# Patient Record
Sex: FEMALE | Race: WHITE | NOT HISPANIC OR LATINO | Employment: OTHER | URBAN - NONMETROPOLITAN AREA
[De-identification: names, ages, dates, MRNs, and addresses within clinical notes are randomized per-mention and may not be internally consistent; named-entity substitution may affect disease eponyms.]

---

## 2017-01-17 ENCOUNTER — TRANSCRIBE ORDERS (OUTPATIENT)
Dept: ADMINISTRATIVE | Facility: HOSPITAL | Age: 52
End: 2017-01-17

## 2017-01-17 DIAGNOSIS — Z12.31 VISIT FOR SCREENING MAMMOGRAM: Primary | ICD-10-CM

## 2017-01-20 ENCOUNTER — HOSPITAL ENCOUNTER (OUTPATIENT)
Dept: MAMMOGRAPHY | Facility: HOSPITAL | Age: 52
End: 2017-01-20

## 2017-01-27 ENCOUNTER — HOSPITAL ENCOUNTER (OUTPATIENT)
Dept: MAMMOGRAPHY | Facility: HOSPITAL | Age: 52
End: 2017-01-27

## 2017-02-02 ENCOUNTER — HOSPITAL ENCOUNTER (OUTPATIENT)
Dept: MAMMOGRAPHY | Facility: HOSPITAL | Age: 52
Discharge: HOME | End: 2017-02-02
Admitting: NURSE PRACTITIONER

## 2017-02-02 DIAGNOSIS — Z12.31 VISIT FOR SCREENING MAMMOGRAM: ICD-10-CM

## 2017-02-02 PROCEDURE — 77067 SCR MAMMO BI INCL CAD: CPT | Performed by: RADIOLOGY

## 2017-02-02 PROCEDURE — 77063 BREAST TOMOSYNTHESIS BI: CPT

## 2017-02-02 PROCEDURE — G0202 SCR MAMMO BI INCL CAD: HCPCS

## 2017-02-02 PROCEDURE — 77063 BREAST TOMOSYNTHESIS BI: CPT | Performed by: RADIOLOGY

## 2017-10-06 ENCOUNTER — APPOINTMENT (OUTPATIENT)
Dept: GENERAL RADIOLOGY | Facility: HOSPITAL | Age: 52
End: 2017-10-06

## 2017-10-06 ENCOUNTER — HOSPITAL ENCOUNTER (EMERGENCY)
Facility: HOSPITAL | Age: 52
Discharge: HOME | End: 2017-10-06
Attending: EMERGENCY MEDICINE | Admitting: EMERGENCY MEDICINE

## 2017-10-06 VITALS
OXYGEN SATURATION: 96 % | TEMPERATURE: 98.2 F | RESPIRATION RATE: 20 BRPM | HEIGHT: 60 IN | HEART RATE: 88 BPM | SYSTOLIC BLOOD PRESSURE: 155 MMHG | WEIGHT: 170 LBS | BODY MASS INDEX: 33.38 KG/M2 | DIASTOLIC BLOOD PRESSURE: 87 MMHG

## 2017-10-06 DIAGNOSIS — N39.0 ACUTE UTI: ICD-10-CM

## 2017-10-06 DIAGNOSIS — J44.1 COPD WITH ACUTE EXACERBATION (HCC): Primary | ICD-10-CM

## 2017-10-06 LAB
A-A DO2: 56.2 MMHG (ref 0–300)
ALBUMIN SERPL-MCNC: 3.7 G/DL (ref 3.5–5)
ALBUMIN/GLOB SERPL: 1.1 G/DL (ref 1.5–2.5)
ALP SERPL-CCNC: 71 U/L (ref 35–104)
ALT SERPL W P-5'-P-CCNC: 42 U/L (ref 10–36)
ANION GAP SERPL CALCULATED.3IONS-SCNC: 3 MMOL/L (ref 3.6–11.2)
ANISOCYTOSIS BLD QL: NORMAL
APTT PPP: 28.1 SECONDS (ref 23.8–36.1)
ARTERIAL PATENCY WRIST A: POSITIVE
AST SERPL-CCNC: 32 U/L (ref 10–30)
ATMOSPHERIC PRESS: 729 MMHG
BACTERIA UR QL AUTO: ABNORMAL /HPF
BASE EXCESS BLDA CALC-SCNC: 8.5 MMOL/L
BASOPHILS # BLD AUTO: 0.01 10*3/MM3 (ref 0–0.3)
BASOPHILS NFR BLD AUTO: 0.2 % (ref 0–2)
BDY SITE: ABNORMAL
BILIRUB SERPL-MCNC: 0.7 MG/DL (ref 0.2–1.8)
BILIRUB UR QL STRIP: NEGATIVE
BNP SERPL-MCNC: 285 PG/ML (ref 0–100)
BODY TEMPERATURE: 98.6 C
BUN BLD-MCNC: 10 MG/DL (ref 7–21)
BUN/CREAT SERPL: 15.2 (ref 7–25)
CALCIUM SPEC-SCNC: 9.2 MG/DL (ref 7.7–10)
CHLORIDE SERPL-SCNC: 99 MMOL/L (ref 99–112)
CLARITY UR: ABNORMAL
CO2 SERPL-SCNC: 38 MMOL/L (ref 24.3–31.9)
COHGB MFR BLD: 11.7 % (ref 0–5)
COLOR UR: YELLOW
CREAT BLD-MCNC: 0.66 MG/DL (ref 0.43–1.29)
DEPRECATED RDW RBC AUTO: 60.4 FL (ref 37–54)
EOSINOPHIL # BLD AUTO: 0.04 10*3/MM3 (ref 0–0.7)
EOSINOPHIL NFR BLD AUTO: 0.6 % (ref 0–5)
ERYTHROCYTE [DISTWIDTH] IN BLOOD BY AUTOMATED COUNT: 15.5 % (ref 11.5–14.5)
GAS FLOW AIRWAY: 2 LPM
GFR SERPL CREATININE-BSD FRML MDRD: 95 ML/MIN/1.73
GLOBULIN UR ELPH-MCNC: 3.3 GM/DL
GLUCOSE BLD-MCNC: 110 MG/DL (ref 70–110)
GLUCOSE UR STRIP-MCNC: NEGATIVE MG/DL
HCO3 BLDA-SCNC: 38.4 MMOL/L (ref 22–26)
HCT VFR BLD AUTO: 61.4 % (ref 37–47)
HCT VFR BLD CALC: 59 % (ref 37–47)
HGB BLD-MCNC: 19.2 G/DL (ref 12–16)
HGB BLDA-MCNC: >20 G/DL (ref 12–16)
HGB UR QL STRIP.AUTO: ABNORMAL
HOLD SPECIMEN: NORMAL
HOLD SPECIMEN: NORMAL
HOROWITZ INDEX BLD+IHG-RTO: 28 %
HYALINE CASTS UR QL AUTO: ABNORMAL /LPF
HYPOCHROMIA BLD QL: NORMAL
IMM GRANULOCYTES # BLD: 0.02 10*3/MM3 (ref 0–0.03)
IMM GRANULOCYTES NFR BLD: 0.3 % (ref 0–0.5)
INR PPP: 0.95 (ref 0.9–1.1)
KETONES UR QL STRIP: NEGATIVE
LARGE PLATELETS: NORMAL
LEUKOCYTE ESTERASE UR QL STRIP.AUTO: ABNORMAL
LYMPHOCYTES # BLD AUTO: 1.29 10*3/MM3 (ref 1–3)
LYMPHOCYTES NFR BLD AUTO: 19.9 % (ref 21–51)
MACROCYTES BLD QL SMEAR: NORMAL
MCH RBC QN AUTO: 33.3 PG (ref 27–33)
MCHC RBC AUTO-ENTMCNC: 31.3 G/DL (ref 33–37)
MCV RBC AUTO: 106.4 FL (ref 80–94)
METHGB BLD QL: 0.2 % (ref 0–3)
MODALITY: ABNORMAL
MONOCYTES # BLD AUTO: 0.51 10*3/MM3 (ref 0.1–0.9)
MONOCYTES NFR BLD AUTO: 7.9 % (ref 0–10)
NEUTROPHILS # BLD AUTO: 4.61 10*3/MM3 (ref 1.4–6.5)
NEUTROPHILS NFR BLD AUTO: 71.1 % (ref 30–70)
NITRITE UR QL STRIP: NEGATIVE
OSMOLALITY SERPL CALC.SUM OF ELEC: 279.1 MOSM/KG (ref 273–305)
OXYHGB MFR BLDV: 77.1 % (ref 85–100)
PCO2 BLDA: 70.9 MM HG (ref 35–45)
PH BLDA: 7.35 PH UNITS (ref 7.35–7.45)
PH UR STRIP.AUTO: 8 [PH] (ref 5–8)
PLATELET # BLD AUTO: 109 10*3/MM3 (ref 130–400)
PMV BLD AUTO: 13.5 FL (ref 6–10)
PO2 BLDA: 51.1 MM HG (ref 80–100)
POTASSIUM BLD-SCNC: 4.5 MMOL/L (ref 3.5–5.3)
PROT SERPL-MCNC: 7 G/DL (ref 6–8)
PROT UR QL STRIP: ABNORMAL
PROTHROMBIN TIME: 12.7 SECONDS (ref 11–15.4)
RBC # BLD AUTO: 5.77 10*6/MM3 (ref 4.2–5.4)
RBC # UR: ABNORMAL /HPF
REF LAB TEST METHOD: ABNORMAL
SAO2 % BLDCOA: 87.5 % (ref 90–100)
SMALL PLATELETS BLD QL SMEAR: NORMAL
SODIUM BLD-SCNC: 140 MMOL/L (ref 135–153)
SP GR UR STRIP: 1.01 (ref 1–1.03)
SQUAMOUS #/AREA URNS HPF: ABNORMAL /HPF
STOMATOCYTES BLD QL SMEAR: NORMAL
TROPONIN I SERPL-MCNC: 0.04 NG/ML
UROBILINOGEN UR QL STRIP: ABNORMAL
WBC CLUMPS # UR AUTO: ABNORMAL /HPF
WBC NRBC COR # BLD: 6.48 10*3/MM3 (ref 4.5–12.5)
WBC UR QL AUTO: ABNORMAL /HPF
WHOLE BLOOD HOLD SPECIMEN: NORMAL
WHOLE BLOOD HOLD SPECIMEN: NORMAL

## 2017-10-06 PROCEDURE — 87077 CULTURE AEROBIC IDENTIFY: CPT | Performed by: EMERGENCY MEDICINE

## 2017-10-06 PROCEDURE — 71010 XR CHEST 1 VW: CPT | Performed by: RADIOLOGY

## 2017-10-06 PROCEDURE — 84484 ASSAY OF TROPONIN QUANT: CPT | Performed by: EMERGENCY MEDICINE

## 2017-10-06 PROCEDURE — 94640 AIRWAY INHALATION TREATMENT: CPT

## 2017-10-06 PROCEDURE — 85730 THROMBOPLASTIN TIME PARTIAL: CPT | Performed by: EMERGENCY MEDICINE

## 2017-10-06 PROCEDURE — 94799 UNLISTED PULMONARY SVC/PX: CPT

## 2017-10-06 PROCEDURE — 82805 BLOOD GASES W/O2 SATURATION: CPT | Performed by: EMERGENCY MEDICINE

## 2017-10-06 PROCEDURE — 81001 URINALYSIS AUTO W/SCOPE: CPT | Performed by: EMERGENCY MEDICINE

## 2017-10-06 PROCEDURE — 80053 COMPREHEN METABOLIC PANEL: CPT | Performed by: EMERGENCY MEDICINE

## 2017-10-06 PROCEDURE — 96374 THER/PROPH/DIAG INJ IV PUSH: CPT

## 2017-10-06 PROCEDURE — 82375 ASSAY CARBOXYHB QUANT: CPT | Performed by: EMERGENCY MEDICINE

## 2017-10-06 PROCEDURE — 87086 URINE CULTURE/COLONY COUNT: CPT | Performed by: EMERGENCY MEDICINE

## 2017-10-06 PROCEDURE — 93005 ELECTROCARDIOGRAM TRACING: CPT | Performed by: EMERGENCY MEDICINE

## 2017-10-06 PROCEDURE — 87186 SC STD MICRODIL/AGAR DIL: CPT | Performed by: EMERGENCY MEDICINE

## 2017-10-06 PROCEDURE — 25010000002 METHYLPREDNISOLONE PER 125 MG: Performed by: EMERGENCY MEDICINE

## 2017-10-06 PROCEDURE — 96375 TX/PRO/DX INJ NEW DRUG ADDON: CPT

## 2017-10-06 PROCEDURE — 83880 ASSAY OF NATRIURETIC PEPTIDE: CPT | Performed by: EMERGENCY MEDICINE

## 2017-10-06 PROCEDURE — 36600 WITHDRAWAL OF ARTERIAL BLOOD: CPT | Performed by: EMERGENCY MEDICINE

## 2017-10-06 PROCEDURE — 71010 HC CHEST PA OR AP: CPT

## 2017-10-06 PROCEDURE — 99283 EMERGENCY DEPT VISIT LOW MDM: CPT

## 2017-10-06 PROCEDURE — 85007 BL SMEAR W/DIFF WBC COUNT: CPT | Performed by: EMERGENCY MEDICINE

## 2017-10-06 PROCEDURE — 83050 HGB METHEMOGLOBIN QUAN: CPT | Performed by: EMERGENCY MEDICINE

## 2017-10-06 PROCEDURE — 85025 COMPLETE CBC W/AUTO DIFF WBC: CPT | Performed by: EMERGENCY MEDICINE

## 2017-10-06 PROCEDURE — 25010000002 DIPHENHYDRAMINE PER 50 MG: Performed by: EMERGENCY MEDICINE

## 2017-10-06 PROCEDURE — 25010000002 CEFTRIAXONE PER 250 MG: Performed by: EMERGENCY MEDICINE

## 2017-10-06 PROCEDURE — 85610 PROTHROMBIN TIME: CPT | Performed by: EMERGENCY MEDICINE

## 2017-10-06 RX ORDER — IPRATROPIUM BROMIDE AND ALBUTEROL SULFATE 2.5; .5 MG/3ML; MG/3ML
3 SOLUTION RESPIRATORY (INHALATION) ONCE
Status: DISCONTINUED | OUTPATIENT
Start: 2017-10-06 | End: 2017-10-06

## 2017-10-06 RX ORDER — SULFAMETHOXAZOLE AND TRIMETHOPRIM 800; 160 MG/1; MG/1
1 TABLET ORAL 2 TIMES DAILY
Qty: 14 TABLET | Refills: 0 | Status: SHIPPED | OUTPATIENT
Start: 2017-10-06 | End: 2017-10-13

## 2017-10-06 RX ORDER — METHYLPREDNISOLONE SODIUM SUCCINATE 125 MG/2ML
125 INJECTION, POWDER, LYOPHILIZED, FOR SOLUTION INTRAMUSCULAR; INTRAVENOUS ONCE
Status: COMPLETED | OUTPATIENT
Start: 2017-10-06 | End: 2017-10-06

## 2017-10-06 RX ORDER — DIPHENHYDRAMINE HYDROCHLORIDE 50 MG/ML
INJECTION INTRAMUSCULAR; INTRAVENOUS
Status: DISCONTINUED
Start: 2017-10-06 | End: 2017-10-06 | Stop reason: HOSPADM

## 2017-10-06 RX ORDER — SODIUM CHLORIDE 0.9 % (FLUSH) 0.9 %
10 SYRINGE (ML) INJECTION AS NEEDED
Status: DISCONTINUED | OUTPATIENT
Start: 2017-10-06 | End: 2017-10-06 | Stop reason: HOSPADM

## 2017-10-06 RX ORDER — ALBUTEROL SULFATE 2.5 MG/3ML
2.5 SOLUTION RESPIRATORY (INHALATION) ONCE
Status: COMPLETED | OUTPATIENT
Start: 2017-10-06 | End: 2017-10-06

## 2017-10-06 RX ORDER — IPRATROPIUM BROMIDE AND ALBUTEROL SULFATE 2.5; .5 MG/3ML; MG/3ML
SOLUTION RESPIRATORY (INHALATION)
Status: COMPLETED
Start: 2017-10-06 | End: 2017-10-06

## 2017-10-06 RX ORDER — IPRATROPIUM BROMIDE AND ALBUTEROL SULFATE 2.5; .5 MG/3ML; MG/3ML
3 SOLUTION RESPIRATORY (INHALATION) ONCE
Status: COMPLETED | OUTPATIENT
Start: 2017-10-06 | End: 2017-10-06

## 2017-10-06 RX ORDER — DIPHENHYDRAMINE HYDROCHLORIDE 50 MG/ML
25 INJECTION INTRAMUSCULAR; INTRAVENOUS ONCE
Status: COMPLETED | OUTPATIENT
Start: 2017-10-06 | End: 2017-10-06

## 2017-10-06 RX ORDER — PREDNISONE 10 MG/1
10 TABLET ORAL DAILY
Qty: 7 TABLET | Refills: 0 | Status: ON HOLD | OUTPATIENT
Start: 2017-10-06 | End: 2018-04-02

## 2017-10-06 RX ADMIN — IPRATROPIUM BROMIDE AND ALBUTEROL SULFATE 3 ML: .5; 3 SOLUTION RESPIRATORY (INHALATION) at 12:42

## 2017-10-06 RX ADMIN — IPRATROPIUM BROMIDE AND ALBUTEROL SULFATE 3 ML: 2.5; .5 SOLUTION RESPIRATORY (INHALATION) at 12:42

## 2017-10-06 RX ADMIN — ALBUTEROL SULFATE 2.5 MG: 2.5 SOLUTION RESPIRATORY (INHALATION) at 14:16

## 2017-10-06 RX ADMIN — DIPHENHYDRAMINE HYDROCHLORIDE 25 MG: 50 INJECTION INTRAMUSCULAR; INTRAVENOUS at 14:50

## 2017-10-06 RX ADMIN — METHYLPREDNISOLONE SODIUM SUCCINATE 125 MG: 125 INJECTION, POWDER, FOR SOLUTION INTRAMUSCULAR; INTRAVENOUS at 14:32

## 2017-10-06 RX ADMIN — CEFTRIAXONE 1 G: 1 INJECTION, SOLUTION INTRAVENOUS at 14:37

## 2017-10-06 NOTE — ED PROVIDER NOTES
Subjective   Patient is a 51 y.o. female presenting with shortness of breath.   Shortness of Breath   Severity:  Moderate  Onset quality:  Gradual  Timing:  Constant  Progression:  Worsening  Chronicity:  Chronic  Context: activity    Relieved by:  Nothing  Worsened by:  Exertion and movement  Associated symptoms: cough and wheezing    Associated symptoms: no abdominal pain, no chest pain and no fever        Review of Systems   Constitutional: Negative.  Negative for fever.   HENT: Negative.    Respiratory: Positive for cough, shortness of breath and wheezing.    Cardiovascular: Negative.  Negative for chest pain.   Gastrointestinal: Negative.  Negative for abdominal pain.   Endocrine: Negative.    Genitourinary: Negative.  Negative for dysuria.   Skin: Negative.    Neurological: Negative.    Psychiatric/Behavioral: Negative.    All other systems reviewed and are negative.      No past medical history on file.    Allergies   Allergen Reactions   • Amoxicillin    • Codeine        Past Surgical History:   Procedure Laterality Date   • HYSTERECTOMY         Family History   Problem Relation Age of Onset   • Breast cancer Paternal Aunt        Social History     Social History   • Marital status:      Spouse name: N/A   • Number of children: N/A   • Years of education: N/A     Social History Main Topics   • Smoking status: Not on file   • Smokeless tobacco: Not on file   • Alcohol use No   • Drug use: Not on file   • Sexual activity: Not on file     Other Topics Concern   • Not on file     Social History Narrative   • No narrative on file           Objective   Physical Exam   Constitutional: She is oriented to person, place, and time. She appears well-developed and well-nourished. No distress.   HENT:   Head: Normocephalic and atraumatic.   Right Ear: External ear normal.   Left Ear: External ear normal.   Nose: Nose normal.   Eyes: Conjunctivae and EOM are normal. Pupils are equal, round, and reactive to light.    Neck: Normal range of motion. Neck supple. No JVD present. No tracheal deviation present.   Cardiovascular: Normal rate, regular rhythm and normal heart sounds.    No murmur heard.  Pulmonary/Chest: She is in respiratory distress. She has wheezes.   Abdominal: Soft. Bowel sounds are normal. There is no tenderness.   Musculoskeletal: Normal range of motion. She exhibits no edema or deformity.   Neurological: She is alert and oriented to person, place, and time. No cranial nerve deficit.   Skin: Skin is warm and dry. No rash noted. She is not diaphoretic. No erythema. No pallor.   Psychiatric: She has a normal mood and affect. Her behavior is normal. Thought content normal.   Nursing note and vitals reviewed.      Procedures         ED Course  ED Course                  MDM    Final diagnoses:   COPD with acute exacerbation   Acute UTI            Manfred Wilson MD  10/06/17 5438

## 2017-10-09 LAB
BACTERIA SPEC AEROBE CULT: ABNORMAL
BACTERIA SPEC AEROBE CULT: ABNORMAL

## 2017-10-23 ENCOUNTER — APPOINTMENT (OUTPATIENT)
Dept: GENERAL RADIOLOGY | Facility: HOSPITAL | Age: 52
End: 2017-10-23

## 2017-10-23 ENCOUNTER — HOSPITAL ENCOUNTER (EMERGENCY)
Facility: HOSPITAL | Age: 52
Discharge: LEFT BEFORE DISCHARGE | End: 2017-10-23
Attending: FAMILY MEDICINE | Admitting: FAMILY MEDICINE

## 2017-10-23 VITALS
TEMPERATURE: 98.3 F | HEART RATE: 88 BPM | HEIGHT: 60 IN | BODY MASS INDEX: 34.16 KG/M2 | OXYGEN SATURATION: 88 % | WEIGHT: 174 LBS | DIASTOLIC BLOOD PRESSURE: 78 MMHG | RESPIRATION RATE: 20 BRPM | SYSTOLIC BLOOD PRESSURE: 133 MMHG

## 2017-10-23 DIAGNOSIS — J96.02 ACUTE RESPIRATORY FAILURE WITH HYPOXIA AND HYPERCAPNIA (HCC): Primary | ICD-10-CM

## 2017-10-23 DIAGNOSIS — J96.01 ACUTE RESPIRATORY FAILURE WITH HYPOXIA AND HYPERCAPNIA (HCC): Primary | ICD-10-CM

## 2017-10-23 LAB
A-A DO2: 43.7 MMHG (ref 0–300)
A-A DO2: 78.9 MMHG (ref 0–300)
ALBUMIN SERPL-MCNC: 3.8 G/DL (ref 3.5–5)
ALBUMIN/GLOB SERPL: 1.1 G/DL (ref 1.5–2.5)
ALP SERPL-CCNC: 74 U/L (ref 35–104)
ALT SERPL W P-5'-P-CCNC: 28 U/L (ref 10–36)
ANION GAP SERPL CALCULATED.3IONS-SCNC: 3.5 MMOL/L (ref 3.6–11.2)
APTT PPP: 31.6 SECONDS (ref 23.8–36.1)
ARTERIAL PATENCY WRIST A: POSITIVE
ARTERIAL PATENCY WRIST A: POSITIVE
AST SERPL-CCNC: 26 U/L (ref 10–30)
ATMOSPHERIC PRESS: 723 MMHG
ATMOSPHERIC PRESS: 723 MMHG
BASE EXCESS BLDA CALC-SCNC: 8.1 MMOL/L
BASE EXCESS BLDA CALC-SCNC: 9.4 MMOL/L
BASOPHILS # BLD AUTO: 0.02 10*3/MM3 (ref 0–0.3)
BASOPHILS NFR BLD AUTO: 0.3 % (ref 0–2)
BDY SITE: ABNORMAL
BDY SITE: ABNORMAL
BILIRUB SERPL-MCNC: 0.3 MG/DL (ref 0.2–1.8)
BODY TEMPERATURE: 98.6 C
BODY TEMPERATURE: 98.6 C
BUN BLD-MCNC: 8 MG/DL (ref 7–21)
BUN/CREAT SERPL: 12.1 (ref 7–25)
CALCIUM SPEC-SCNC: 9.3 MG/DL (ref 7.7–10)
CHLORIDE SERPL-SCNC: 98 MMOL/L (ref 99–112)
CO2 SERPL-SCNC: 37.5 MMOL/L (ref 24.3–31.9)
COHGB MFR BLD: 11.1 % (ref 0–5)
COHGB MFR BLD: 12.9 % (ref 0–5)
CREAT BLD-MCNC: 0.66 MG/DL (ref 0.43–1.29)
CRP SERPL-MCNC: <0.5 MG/DL (ref 0–0.99)
D DIMER PPP FEU-MCNC: <0.27 MCGFEU/ML (ref 0–0.5)
D-LACTATE SERPL-SCNC: 1.1 MMOL/L (ref 0.5–2)
DEPRECATED RDW RBC AUTO: 58.7 FL (ref 37–54)
EOSINOPHIL # BLD AUTO: 0.06 10*3/MM3 (ref 0–0.7)
EOSINOPHIL NFR BLD AUTO: 0.8 % (ref 0–5)
EPAP: 6
ERYTHROCYTE [DISTWIDTH] IN BLOOD BY AUTOMATED COUNT: 15.5 % (ref 11.5–14.5)
GFR SERPL CREATININE-BSD FRML MDRD: 95 ML/MIN/1.73
GLOBULIN UR ELPH-MCNC: 3.6 GM/DL
GLUCOSE BLD-MCNC: 113 MG/DL (ref 70–110)
HCO3 BLDA-SCNC: 40 MMOL/L (ref 22–26)
HCO3 BLDA-SCNC: 40.7 MMOL/L (ref 22–26)
HCT VFR BLD AUTO: 64.9 % (ref 37–47)
HCT VFR BLD CALC: 62 % (ref 37–47)
HCT VFR BLD CALC: 63 % (ref 37–47)
HGB BLD-MCNC: 20.3 G/DL (ref 12–16)
HGB BLDA-MCNC: >20 G/DL (ref 12–16)
HGB BLDA-MCNC: >20 G/DL (ref 12–16)
HOROWITZ INDEX BLD+IHG-RTO: 28 %
HOROWITZ INDEX BLD+IHG-RTO: 35 %
IMM GRANULOCYTES # BLD: 0.01 10*3/MM3 (ref 0–0.03)
IMM GRANULOCYTES NFR BLD: 0.1 % (ref 0–0.5)
INR PPP: 0.96 (ref 0.9–1.1)
IPAP: 15
LYMPHOCYTES # BLD AUTO: 1.37 10*3/MM3 (ref 1–3)
LYMPHOCYTES NFR BLD AUTO: 18.2 % (ref 21–51)
MCH RBC QN AUTO: 32.1 PG (ref 27–33)
MCHC RBC AUTO-ENTMCNC: 31.3 G/DL (ref 33–37)
MCV RBC AUTO: 102.7 FL (ref 80–94)
METHGB BLD QL: 0.3 % (ref 0–3)
METHGB BLD QL: 0.4 % (ref 0–3)
MODALITY: ABNORMAL
MODALITY: ABNORMAL
MONOCYTES # BLD AUTO: 0.63 10*3/MM3 (ref 0.1–0.9)
MONOCYTES NFR BLD AUTO: 8.4 % (ref 0–10)
NEUTROPHILS # BLD AUTO: 5.42 10*3/MM3 (ref 1.4–6.5)
NEUTROPHILS NFR BLD AUTO: 72.2 % (ref 30–70)
OSMOLALITY SERPL CALC.SUM OF ELEC: 276.7 MOSM/KG (ref 273–305)
OXYHGB MFR BLDV: 77.5 % (ref 85–100)
OXYHGB MFR BLDV: 80.7 % (ref 85–100)
PCO2 BLDA: 77.7 MM HG (ref 35–45)
PCO2 BLDA: 82.8 MM HG (ref 35–45)
PH BLDA: 7.3 PH UNITS (ref 7.35–7.45)
PH BLDA: 7.34 PH UNITS (ref 7.35–7.45)
PLATELET # BLD AUTO: 97 10*3/MM3 (ref 130–400)
PMV BLD AUTO: 12.4 FL (ref 6–10)
PO2 BLDA: 53.9 MM HG (ref 80–100)
PO2 BLDA: 61.4 MM HG (ref 80–100)
POTASSIUM BLD-SCNC: 4.4 MMOL/L (ref 3.5–5.3)
PROT SERPL-MCNC: 7.4 G/DL (ref 6–8)
PROTHROMBIN TIME: 12.9 SECONDS (ref 11–15.4)
RBC # BLD AUTO: 6.32 10*6/MM3 (ref 4.2–5.4)
SAO2 % BLDCOA: 89.3 % (ref 90–100)
SAO2 % BLDCOA: 91.2 % (ref 90–100)
SET MECH RESP RATE: 18
SODIUM BLD-SCNC: 139 MMOL/L (ref 135–153)
TROPONIN I SERPL-MCNC: 0.02 NG/ML
WBC NRBC COR # BLD: 7.51 10*3/MM3 (ref 4.5–12.5)

## 2017-10-23 PROCEDURE — 83050 HGB METHEMOGLOBIN QUAN: CPT | Performed by: FAMILY MEDICINE

## 2017-10-23 PROCEDURE — 87040 BLOOD CULTURE FOR BACTERIA: CPT | Performed by: FAMILY MEDICINE

## 2017-10-23 PROCEDURE — 80053 COMPREHEN METABOLIC PANEL: CPT | Performed by: FAMILY MEDICINE

## 2017-10-23 PROCEDURE — 83605 ASSAY OF LACTIC ACID: CPT | Performed by: FAMILY MEDICINE

## 2017-10-23 PROCEDURE — 85730 THROMBOPLASTIN TIME PARTIAL: CPT | Performed by: FAMILY MEDICINE

## 2017-10-23 PROCEDURE — 85379 FIBRIN DEGRADATION QUANT: CPT | Performed by: FAMILY MEDICINE

## 2017-10-23 PROCEDURE — 93005 ELECTROCARDIOGRAM TRACING: CPT | Performed by: FAMILY MEDICINE

## 2017-10-23 PROCEDURE — 82805 BLOOD GASES W/O2 SATURATION: CPT | Performed by: FAMILY MEDICINE

## 2017-10-23 PROCEDURE — 85025 COMPLETE CBC W/AUTO DIFF WBC: CPT | Performed by: FAMILY MEDICINE

## 2017-10-23 PROCEDURE — 94799 UNLISTED PULMONARY SVC/PX: CPT

## 2017-10-23 PROCEDURE — 36415 COLL VENOUS BLD VENIPUNCTURE: CPT

## 2017-10-23 PROCEDURE — 99284 EMERGENCY DEPT VISIT MOD MDM: CPT

## 2017-10-23 PROCEDURE — 86140 C-REACTIVE PROTEIN: CPT | Performed by: FAMILY MEDICINE

## 2017-10-23 PROCEDURE — 71010 HC CHEST PA OR AP: CPT

## 2017-10-23 PROCEDURE — 36600 WITHDRAWAL OF ARTERIAL BLOOD: CPT | Performed by: FAMILY MEDICINE

## 2017-10-23 PROCEDURE — 71010 XR CHEST 1 VW: CPT | Performed by: RADIOLOGY

## 2017-10-23 PROCEDURE — 94640 AIRWAY INHALATION TREATMENT: CPT

## 2017-10-23 PROCEDURE — 94660 CPAP INITIATION&MGMT: CPT

## 2017-10-23 PROCEDURE — 82375 ASSAY CARBOXYHB QUANT: CPT | Performed by: FAMILY MEDICINE

## 2017-10-23 PROCEDURE — 84484 ASSAY OF TROPONIN QUANT: CPT | Performed by: FAMILY MEDICINE

## 2017-10-23 PROCEDURE — 93010 ELECTROCARDIOGRAM REPORT: CPT | Performed by: INTERNAL MEDICINE

## 2017-10-23 PROCEDURE — 85610 PROTHROMBIN TIME: CPT | Performed by: FAMILY MEDICINE

## 2017-10-23 RX ORDER — SODIUM CHLORIDE 0.9 % (FLUSH) 0.9 %
10 SYRINGE (ML) INJECTION AS NEEDED
Status: DISCONTINUED | OUTPATIENT
Start: 2017-10-23 | End: 2017-10-23 | Stop reason: HOSPADM

## 2017-10-23 RX ORDER — ALBUTEROL SULFATE 90 UG/1
2 AEROSOL, METERED RESPIRATORY (INHALATION) EVERY 4 HOURS PRN
COMMUNITY

## 2017-10-23 RX ORDER — IBUPROFEN 800 MG/1
800 TABLET ORAL EVERY 6 HOURS PRN
Status: ON HOLD | COMMUNITY
End: 2018-04-02

## 2017-10-23 RX ORDER — BUDESONIDE AND FORMOTEROL FUMARATE DIHYDRATE 160; 4.5 UG/1; UG/1
2 AEROSOL RESPIRATORY (INHALATION)
Status: ON HOLD | COMMUNITY
End: 2018-04-02

## 2017-10-23 RX ORDER — DOXYCYCLINE HYCLATE 100 MG/1
100 TABLET, DELAYED RELEASE ORAL 2 TIMES DAILY
Qty: 20 TABLET | Refills: 0 | Status: SHIPPED | OUTPATIENT
Start: 2017-10-23 | End: 2017-11-02

## 2017-10-23 RX ORDER — DOXYCYCLINE 100 MG/1
CAPSULE ORAL
Status: COMPLETED
Start: 2017-10-23 | End: 2017-10-23

## 2017-10-23 RX ORDER — IPRATROPIUM BROMIDE AND ALBUTEROL SULFATE 2.5; .5 MG/3ML; MG/3ML
3 SOLUTION RESPIRATORY (INHALATION) ONCE
Status: COMPLETED | OUTPATIENT
Start: 2017-10-23 | End: 2017-10-23

## 2017-10-23 RX ORDER — DOXYCYCLINE 100 MG/1
100 CAPSULE ORAL ONCE
Status: COMPLETED | OUTPATIENT
Start: 2017-10-23 | End: 2017-10-23

## 2017-10-23 RX ORDER — PREDNISONE 20 MG/1
20 TABLET ORAL 3 TIMES DAILY
Qty: 15 TABLET | Refills: 0 | Status: SHIPPED | OUTPATIENT
Start: 2017-10-23 | End: 2017-10-28

## 2017-10-23 RX ORDER — SULFAMETHOXAZOLE AND TRIMETHOPRIM 800; 160 MG/1; MG/1
1 TABLET ORAL 2 TIMES DAILY
Status: ON HOLD | COMMUNITY
End: 2018-04-02

## 2017-10-23 RX ADMIN — DOXYCYCLINE 100 MG: 100 CAPSULE ORAL at 12:53

## 2017-10-23 RX ADMIN — IPRATROPIUM BROMIDE AND ALBUTEROL SULFATE 3 ML: .5; 3 SOLUTION RESPIRATORY (INHALATION) at 10:40

## 2017-10-23 NOTE — ED NOTES
Pt resting quietly on stretcher with no complaints.  Pt AAOx4 with no resp distress noted, respirations even and unlabored.  Pt denies any needs at this time.  Skin PWD.  Pt family at bedside. Will continue to monitor and follow plan of care.  Bed rails up x2, bed in lowest position, call light in reach.       Rosalina Siegel RN  10/23/17 5492

## 2017-10-23 NOTE — ED NOTES
Pt resting quietly on stretcher with no complaints.  Pt AAOx4 with no resp distress noted, respirations even and unlabored.  Pt denies any needs at this time.  Skin PWD.  Pt family at bedside. Will continue to monitor and follow plan of care.  Bed rails up x2, bed in lowest position, call light in reach.       Rosalina Siegel RN  10/23/17 5072

## 2017-10-23 NOTE — ED PROVIDER NOTES
Subjective   HPI Comments: PT reports that she is not sure why she is here because she did not want to be here to begin with- she never went to her pcp for her breathing she went for her abdominal pain- so why she is here this is her baseline of breathing    Patient is a 51 y.o. female presenting with shortness of breath.   History provided by:  Patient, caregiver and relative  Shortness of Breath   Severity:  Severe  Onset quality:  Unable to specify  Timing:  Unable to specify  Progression:  Unable to specify  Chronicity:  Recurrent  Context: activity and weather changes    Relieved by:  Nothing  Worsened by:  Coughing  Ineffective treatments:  None tried  Associated symptoms: cough, sputum production and wheezing    Associated symptoms: no abdominal pain, no chest pain, no claudication, no fever, no headaches, no hemoptysis, no neck pain, no PND, no rash and no vomiting    Risk factors: tobacco use    Risk factors: no recent alcohol use, no family hx of DVT, no hx of cancer, no hx of PE/DVT, no obesity, no oral contraceptive use, no prolonged immobilization and no recent surgery        Review of Systems   Constitutional: Negative for activity change, appetite change, chills, fatigue and fever.   HENT: Negative for congestion.    Eyes: Negative for pain.   Respiratory: Positive for cough, sputum production, shortness of breath and wheezing. Negative for hemoptysis and stridor.    Cardiovascular: Negative for chest pain, claudication and PND.   Gastrointestinal: Negative for abdominal pain, diarrhea, nausea and vomiting.   Genitourinary: Negative for dysuria.   Musculoskeletal: Negative for arthralgias, myalgias, neck pain and neck stiffness.   Skin: Negative for rash.   Neurological: Negative for dizziness, syncope, speech difficulty, weakness and headaches.   Psychiatric/Behavioral: Negative for agitation.       Past Medical History:   Diagnosis Date   • COPD (chronic obstructive pulmonary disease)         Allergies   Allergen Reactions   • Amoxicillin    • Codeine    • Rocephin [Ceftriaxone]        Past Surgical History:   Procedure Laterality Date   • HYSTERECTOMY         Family History   Problem Relation Age of Onset   • Breast cancer Paternal Aunt        Social History     Social History   • Marital status:      Spouse name: N/A   • Number of children: N/A   • Years of education: N/A     Social History Main Topics   • Smoking status: Current Every Day Smoker   • Smokeless tobacco: None   • Alcohol use No   • Drug use: None   • Sexual activity: Not Asked     Other Topics Concern   • None     Social History Narrative   • None           Objective   Physical Exam   Constitutional: She is oriented to person, place, and time. She appears well-developed and well-nourished. She appears distressed.   HENT:   Head: Normocephalic and atraumatic.   Right Ear: External ear normal.   Left Ear: External ear normal.   Eyes: EOM are normal.   Neck: Neck supple.   Cardiovascular: Normal rate and regular rhythm.  Exam reveals no friction rub.    No murmur heard.  Pulmonary/Chest: She is in respiratory distress. She has wheezes.   Abdominal: Soft. Bowel sounds are normal.   Musculoskeletal: Normal range of motion.   Neurological: She is alert and oriented to person, place, and time.   Skin: Skin is warm.   Psychiatric: She has a normal mood and affect. Her behavior is normal. Thought content normal.   Nursing note and vitals reviewed.      Procedures         ED Course  ED Course   Value Comment By Time    Pt is alert and oriented - says that she us aware that she may die because her repeat ABG on Bipap  shows worsening pCO2- discussed possible of intubation - pt became very upset and says she will not be put on life support; she is alert and oriented to person place and time and says if she gets worse she will return; get this iv out of her she is going home Toña Oquendo, DO 10/23 4204   ECG 12 Lead EKG  interpretation ×1041 normal sinus rhythm 100 bpm right axis deviation noted QT is 354 QTc is 456 no evidence of acute ischemic change at this time Toña Oquendo DO 10/23 1312                  MDM  Number of Diagnoses or Management Options  Acute respiratory failure with hypoxia and hypercapnia: established and worsening     Amount and/or Complexity of Data Reviewed  Clinical lab tests: ordered and reviewed  Tests in the radiology section of CPT®: reviewed and ordered  Tests in the medicine section of CPT®: reviewed and ordered  Decide to obtain previous medical records or to obtain history from someone other than the patient: yes    Risk of Complications, Morbidity, and/or Mortality  Presenting problems: high  Diagnostic procedures: high  Management options: high    Patient Progress  Patient progress: (Pt left ama)      Final diagnoses:   None            Toña Oquendo DO  10/24/17 0803

## 2017-10-23 NOTE — ED NOTES
"Pt reports that she went to her PCP this am for recent abd swelling that has now subsided so she made an appointment to have that checked.  Pt reports that they sent her here due to having low oxygen sat's.  Pt reports that she has been having increased shortness of breath when she goes to \"quickly walk\" but other than that denies any shortness of breath.  Pt family at bedside.  Pt on home oxygen of 2L NC upon arrival.     Rosalina Siegel RN  10/23/17 1031    "

## 2017-10-23 NOTE — ED NOTES
"Dr. Oquendo in room speaking with patient in regards to worsening blood gases and the progression of treatment options that need to take place to stabilize the patient.  Pt becomes very angry and forcefully removes the Bi-Pap mask and informs \"I want to go home right now.  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  You will not put me on life support, I will go home right now\".  Pt redirected and attempted to calm pt down at this time.  Pt continues to demand that she is not staying and that she is going home and promises that if she worsens she will return to the ER.  Patient friend at bedside and attempting to encourage to stay for further treatment.  Pt continues to refuse further treatment and signs AMA papers at this time.     Rosalina Siegel RN  10/23/17 3717    "

## 2017-10-28 LAB
BACTERIA SPEC AEROBE CULT: NORMAL
BACTERIA SPEC AEROBE CULT: NORMAL

## 2018-04-02 ENCOUNTER — OFFICE VISIT (OUTPATIENT)
Dept: CARDIOLOGY | Facility: CLINIC | Age: 53
End: 2018-04-02

## 2018-04-02 ENCOUNTER — HOSPITAL ENCOUNTER (INPATIENT)
Facility: HOSPITAL | Age: 53
LOS: 3 days | Discharge: LEFT BEFORE DISCHARGE | End: 2018-04-05
Attending: INTERNAL MEDICINE | Admitting: INTERNAL MEDICINE

## 2018-04-02 ENCOUNTER — PREP FOR SURGERY (OUTPATIENT)
Dept: OTHER | Facility: HOSPITAL | Age: 53
End: 2018-04-02

## 2018-04-02 ENCOUNTER — APPOINTMENT (OUTPATIENT)
Dept: GENERAL RADIOLOGY | Facility: HOSPITAL | Age: 53
End: 2018-04-02

## 2018-04-02 VITALS
SYSTOLIC BLOOD PRESSURE: 201 MMHG | WEIGHT: 202 LBS | OXYGEN SATURATION: 73 % | HEART RATE: 91 BPM | HEIGHT: 60 IN | BODY MASS INDEX: 39.66 KG/M2 | RESPIRATION RATE: 18 BRPM | DIASTOLIC BLOOD PRESSURE: 99 MMHG

## 2018-04-02 DIAGNOSIS — I50.9 ACUTE HEART FAILURE, UNSPECIFIED HEART FAILURE TYPE (HCC): Primary | ICD-10-CM

## 2018-04-02 DIAGNOSIS — J41.0 SIMPLE CHRONIC BRONCHITIS (HCC): ICD-10-CM

## 2018-04-02 DIAGNOSIS — I10 UNCONTROLLED HYPERTENSION: ICD-10-CM

## 2018-04-02 DIAGNOSIS — I50.9 ACUTE HEART FAILURE, UNSPECIFIED HEART FAILURE TYPE (HCC): ICD-10-CM

## 2018-04-02 DIAGNOSIS — I50.9 ACUTE DECOMPENSATED HEART FAILURE (HCC): Primary | ICD-10-CM

## 2018-04-02 PROBLEM — J44.9 COPD (CHRONIC OBSTRUCTIVE PULMONARY DISEASE): Status: ACTIVE | Noted: 2018-04-02

## 2018-04-02 LAB
ALBUMIN SERPL-MCNC: 3.4 G/DL (ref 3.5–5)
ALBUMIN/GLOB SERPL: 0.9 G/DL (ref 1.5–2.5)
ALP SERPL-CCNC: 91 U/L (ref 35–104)
ALT SERPL W P-5'-P-CCNC: 16 U/L (ref 10–36)
ANION GAP SERPL CALCULATED.3IONS-SCNC: 7.9 MMOL/L (ref 3.6–11.2)
AST SERPL-CCNC: 22 U/L (ref 10–30)
BASOPHILS # BLD AUTO: 0.01 10*3/MM3 (ref 0–0.3)
BASOPHILS NFR BLD AUTO: 0.2 % (ref 0–2)
BILIRUB SERPL-MCNC: 1 MG/DL (ref 0.2–1.8)
BNP SERPL-MCNC: 712 PG/ML (ref 0–100)
BUN BLD-MCNC: 8 MG/DL (ref 7–21)
BUN/CREAT SERPL: 12.5 (ref 7–25)
CALCIUM SPEC-SCNC: 9.2 MG/DL (ref 7.7–10)
CHLORIDE SERPL-SCNC: 102 MMOL/L (ref 99–112)
CO2 SERPL-SCNC: 37.1 MMOL/L (ref 24.3–31.9)
CREAT BLD-MCNC: 0.64 MG/DL (ref 0.43–1.29)
DEPRECATED RDW RBC AUTO: 65 FL (ref 37–54)
EOSINOPHIL # BLD AUTO: 0.17 10*3/MM3 (ref 0–0.7)
EOSINOPHIL NFR BLD AUTO: 3.2 % (ref 0–5)
ERYTHROCYTE [DISTWIDTH] IN BLOOD BY AUTOMATED COUNT: 18 % (ref 11.5–14.5)
GFR SERPL CREATININE-BSD FRML MDRD: 98 ML/MIN/1.73
GLOBULIN UR ELPH-MCNC: 3.7 GM/DL
GLUCOSE BLD-MCNC: 84 MG/DL (ref 70–110)
HCT VFR BLD AUTO: 57.2 % (ref 37–47)
HGB BLD-MCNC: 17.5 G/DL (ref 12–16)
IMM GRANULOCYTES # BLD: 0.01 10*3/MM3 (ref 0–0.03)
IMM GRANULOCYTES NFR BLD: 0.2 % (ref 0–0.5)
LYMPHOCYTES # BLD AUTO: 0.99 10*3/MM3 (ref 1–3)
LYMPHOCYTES NFR BLD AUTO: 18.6 % (ref 21–51)
MCH RBC QN AUTO: 30.2 PG (ref 27–33)
MCHC RBC AUTO-ENTMCNC: 30.6 G/DL (ref 33–37)
MCV RBC AUTO: 98.6 FL (ref 80–94)
MONOCYTES # BLD AUTO: 0.47 10*3/MM3 (ref 0.1–0.9)
MONOCYTES NFR BLD AUTO: 8.9 % (ref 0–10)
NEUTROPHILS # BLD AUTO: 3.66 10*3/MM3 (ref 1.4–6.5)
NEUTROPHILS NFR BLD AUTO: 68.9 % (ref 30–70)
OSMOLALITY SERPL CALC.SUM OF ELEC: 289.9 MOSM/KG (ref 273–305)
PLATELET # BLD AUTO: 92 10*3/MM3 (ref 130–400)
PMV BLD AUTO: ABNORMAL FL (ref 6–10)
POTASSIUM BLD-SCNC: 3.5 MMOL/L (ref 3.5–5.3)
PROT SERPL-MCNC: 7.1 G/DL (ref 6–8)
RBC # BLD AUTO: 5.8 10*6/MM3 (ref 4.2–5.4)
SODIUM BLD-SCNC: 147 MMOL/L (ref 135–153)
TROPONIN I SERPL-MCNC: 0.05 NG/ML
WBC NRBC COR # BLD: 5.31 10*3/MM3 (ref 4.5–12.5)

## 2018-04-02 PROCEDURE — 99214 OFFICE O/P EST MOD 30 MIN: CPT | Performed by: INTERNAL MEDICINE

## 2018-04-02 PROCEDURE — 83880 ASSAY OF NATRIURETIC PEPTIDE: CPT | Performed by: INTERNAL MEDICINE

## 2018-04-02 PROCEDURE — 71045 X-RAY EXAM CHEST 1 VIEW: CPT | Performed by: RADIOLOGY

## 2018-04-02 PROCEDURE — 94640 AIRWAY INHALATION TREATMENT: CPT

## 2018-04-02 PROCEDURE — 94799 UNLISTED PULMONARY SVC/PX: CPT

## 2018-04-02 PROCEDURE — 84484 ASSAY OF TROPONIN QUANT: CPT | Performed by: INTERNAL MEDICINE

## 2018-04-02 PROCEDURE — 85025 COMPLETE CBC W/AUTO DIFF WBC: CPT | Performed by: INTERNAL MEDICINE

## 2018-04-02 PROCEDURE — 93000 ELECTROCARDIOGRAM COMPLETE: CPT | Performed by: INTERNAL MEDICINE

## 2018-04-02 PROCEDURE — 25010000002 ENOXAPARIN PER 10 MG: Performed by: INTERNAL MEDICINE

## 2018-04-02 PROCEDURE — 71045 X-RAY EXAM CHEST 1 VIEW: CPT

## 2018-04-02 PROCEDURE — 80053 COMPREHEN METABOLIC PANEL: CPT | Performed by: INTERNAL MEDICINE

## 2018-04-02 RX ORDER — DILTIAZEM HYDROCHLORIDE 240 MG/1
240 CAPSULE, COATED, EXTENDED RELEASE ORAL 2 TIMES DAILY
COMMUNITY
End: 2018-04-02 | Stop reason: SDDI

## 2018-04-02 RX ORDER — FUROSEMIDE 20 MG/1
20 TABLET ORAL DAILY
COMMUNITY
End: 2018-04-02 | Stop reason: SDDI

## 2018-04-02 RX ORDER — ALBUTEROL SULFATE 2.5 MG/3ML
2.5 SOLUTION RESPIRATORY (INHALATION) EVERY 4 HOURS PRN
COMMUNITY
End: 2018-08-13 | Stop reason: ALTCHOICE

## 2018-04-02 RX ORDER — BUMETANIDE 0.25 MG/ML
2 INJECTION INTRAMUSCULAR; INTRAVENOUS EVERY 12 HOURS
Status: DISCONTINUED | OUTPATIENT
Start: 2018-04-02 | End: 2018-04-03

## 2018-04-02 RX ORDER — ALBUTEROL SULFATE 2.5 MG/3ML
2.5 SOLUTION RESPIRATORY (INHALATION) EVERY 4 HOURS PRN
Status: DISCONTINUED | OUTPATIENT
Start: 2018-04-02 | End: 2018-04-05 | Stop reason: HOSPADM

## 2018-04-02 RX ORDER — SODIUM CHLORIDE 0.9 % (FLUSH) 0.9 %
1-10 SYRINGE (ML) INJECTION AS NEEDED
Status: DISCONTINUED | OUTPATIENT
Start: 2018-04-02 | End: 2018-04-05 | Stop reason: HOSPADM

## 2018-04-02 RX ORDER — SODIUM CHLORIDE 0.9 % (FLUSH) 0.9 %
1-10 SYRINGE (ML) INJECTION AS NEEDED
Status: CANCELLED | OUTPATIENT
Start: 2018-04-02

## 2018-04-02 RX ORDER — LOSARTAN POTASSIUM 50 MG/1
50 TABLET ORAL DAILY
Status: ON HOLD | COMMUNITY
End: 2018-04-02

## 2018-04-02 RX ORDER — AMLODIPINE BESYLATE 10 MG/1
10 TABLET ORAL
Status: DISCONTINUED | OUTPATIENT
Start: 2018-04-02 | End: 2018-04-04

## 2018-04-02 RX ADMIN — AMLODIPINE BESYLATE 10 MG: 10 TABLET ORAL at 17:32

## 2018-04-02 RX ADMIN — ENOXAPARIN SODIUM 40 MG: 40 INJECTION SUBCUTANEOUS at 15:27

## 2018-04-02 RX ADMIN — BUMETANIDE 2 MG: 0.25 INJECTION INTRAMUSCULAR; INTRAVENOUS at 17:33

## 2018-04-02 RX ADMIN — ALBUTEROL SULFATE 2.5 MG: 2.5 SOLUTION RESPIRATORY (INHALATION) at 14:48

## 2018-04-02 NOTE — PLAN OF CARE
Problem: Patient Care Overview  Goal: Plan of Care Review  Outcome: Ongoing (interventions implemented as appropriate)      Problem: Fall Risk (Adult)  Goal: Identify Related Risk Factors and Signs and Symptoms  Outcome: Ongoing (interventions implemented as appropriate)    Goal: Absence of Fall  Outcome: Ongoing (interventions implemented as appropriate)      Problem: Chronic Obstructive Pulmonary Disease (Adult)  Goal: Signs and Symptoms of Listed Potential Problems Will be Absent, Minimized or Managed (Chronic Obstructive Pulmonary Disease)  Outcome: Ongoing (interventions implemented as appropriate)      Problem: Cardiac: Heart Failure (Adult)  Goal: Signs and Symptoms of Listed Potential Problems Will be Absent, Minimized or Managed (Cardiac: Heart Failure)  Outcome: Ongoing (interventions implemented as appropriate)      Problem: Hypertensive Disease/Crisis (Arterial) (Adult)  Goal: Signs and Symptoms of Listed Potential Problems Will be Absent, Minimized or Managed (Hypertensive Disease/Crisis)  Outcome: Ongoing (interventions implemented as appropriate)

## 2018-04-02 NOTE — PLAN OF CARE
Problem: Patient Care Overview  Goal: Discharge Needs Assessment  Outcome: Ongoing (interventions implemented as appropriate)  Discharge Planning Assessment   Steven     Patient Name: Cristine Berkowitz  MRN: [id number]  Today's Date: 4/2/2018    Admit Date: 4/2/2018          Discharge Needs Assessment     Row Name 04/02/18 1413       Living Environment    Lives With child(soraida), adult;child(soraida), dependent   Pt lives with 25 Y/O son and 10 Y/O son, Amadeo.     Able to Return to Prior Arrangements yes       Discharge Needs Assessment    Equipment Currently Used at Home bipap/cpap;oxygen;nebulizer   Pt has home oxygen @ 2 liters, nebulizer machine (has nebulizer medicine), and C-Pap machine from [employer].     Outpatient/Agency/Support Group Needs --   Pt does not utilize home health services.             Discharge Plan     Row Name 04/02/18 1415       Plan    Plan Pt lives at home with 25 Y/O and 10 Y/O son's and plans to return home at discharge. Pt does not utilize home health services. Pt has home oxygen, nebulizer machine, and C-Pap machine from [employer]. PCP is NP, Afia Lopez and pt also see's Dr. Garcia and Dr. Church. Pt does not have a POA or advance directive. SS to follow and assist as needed with discharge planning.     Patient/Family in Agreement with Plan yes          Demographic Summary     Row Name 04/02/18 1412       General Information    Referral Source nursing    Reason for Consult --   SS received consult d/c planning. SS spoke to pt and sister in law.      Yi Virk

## 2018-04-02 NOTE — SIGNIFICANT NOTE
Pt stated she only likes 1/2 tx. And will do rest at her will. I explain we need to do the whole tx. We cant leave meds for her to do at will.

## 2018-04-02 NOTE — PROGRESS NOTES
Discharge Planning Assessment  [facility]     Patient Name: Cristine Berkowitz  MRN: [id number]  Today's Date: 4/2/2018    Admit Date: 4/2/2018          Discharge Needs Assessment     Row Name 04/02/18 1413       Living Environment    Lives With child(soraida), adult;child(soraida), dependent   Pt lives with 23 Y/O son and 10 Y/O son, Amadeo.     Able to Return to Prior Arrangements yes       Discharge Needs Assessment    Equipment Currently Used at Home bipap/cpap;oxygen;nebulizer   Pt has home oxygen @ 2 liters, nebulizer machine (has nebulizer medicine), and C-Pap machine from [facility].     Outpatient/Agency/Support Group Needs --   Pt does not utilize home health services.             Discharge Plan     Row Name 04/02/18 1415       Plan    Plan Pt lives at home with 23 Y/O and 10 Y/O son's and plans to return home at discharge. Pt does not utilize home health services. Pt has home oxygen, nebulizer machine, and C-Pap machine from [facility]. PCP is NP, Afia Lopez and pt also see's Dr. Garcia and Dr. Church. Pt does not have a POA or advance directive. SS to follow and assist as needed with discharge planning.     Patient/Family in Agreement with Plan yes                  Demographic Summary     Row Name 04/02/18 1412       General Information    Referral Source nursing    Reason for Consult --   SS received consult d/c planning. SS spoke to pt and sister in law.      Yi Virk

## 2018-04-02 NOTE — PROGRESS NOTES
ALONDRA Lott  Cristine Berkowitz  1965 04/02/2018    Patient Active Problem List   Diagnosis   • Acute heart failure   • Acute decompensated heart failure   • COPD (chronic obstructive pulmonary disease)   • Uncontrolled hypertension       Dear ALONDRA Lott:    Subjective     Cristine Berkowitz is a 52 y.o. female with the problems as listed above, presents    History of Present Illness: This Sho is a pleasant 52-year-old  female presenting with complaints of having progressive worsening dyspnea for the last 6 months and progressive weight gain and leg swelling.  On recent chest x-ray performed at her PCPs office she was apparently noted to have cardiomegaly and possible congestive heart failure.  She is here for further cardiac evaluation and management.  She also has underlying significant COPD and has been on home oxygen.  She she has some intermittent chest pressure when she gets short of breath.  She has a long history of smoking up to 3 packs a day but currently has cut down to 8 cigarettes a day.  She is nondiabetic.  She has not had any previous history of known coronary artery disease.  No previous history of congestive heart failure.  She has gained about 28 pounds since October 2017.      Allergies   Allergen Reactions   • Amoxicillin Anaphylaxis   • Codeine Hives   • Latex Itching   • Rocephin [Ceftriaxone]    :      Current Outpatient Prescriptions:   •  albuterol (PROVENTIL HFA;VENTOLIN HFA) 108 (90 Base) MCG/ACT inhaler, Inhale 2 puffs Every 4 (Four) Hours As Needed for Wheezing., Disp: , Rfl:   •  budesonide-formoterol (SYMBICORT) 160-4.5 MCG/ACT inhaler, Inhale 2 puffs 2 (Two) Times a Day., Disp: , Rfl:   •  losartan (COZAAR) 50 MG tablet, Take 50 mg by mouth Daily., Disp: , Rfl:   •  ibuprofen (ADVIL,MOTRIN) 800 MG tablet, Take 800 mg by mouth Every 6 (Six) Hours As Needed for Mild Pain ., Disp: , Rfl:   •  predniSONE (DELTASONE) 10 MG tablet, Take 1 tablet  "by mouth Daily., Disp: 7 tablet, Rfl: 0  •  sulfamethoxazole-trimethoprim (BACTRIM DS,SEPTRA DS) 800-160 MG per tablet, Take 1 tablet by mouth 2 (Two) Times a Day., Disp: , Rfl:     Past Medical History:   Diagnosis Date   • COPD (chronic obstructive pulmonary disease)      Past Surgical History:   Procedure Laterality Date   • HYSTERECTOMY       Family History   Problem Relation Age of Onset   • Breast cancer Paternal Aunt      Social History   Substance Use Topics   • Smoking status: Current Every Day Smoker   • Smokeless tobacco: Not on file   • Alcohol use No       Review of Systems   Constitution: Positive for malaise/fatigue.   Cardiovascular: Positive for dyspnea on exertion, leg swelling and palpitations.   Respiratory: Positive for shortness of breath (on O2, uses nebulizer and inhalers).    Hematologic/Lymphatic: Bruises/bleeds easily.   Skin: Positive for itching and rash.   Musculoskeletal: Positive for back pain, joint pain, muscle cramps, muscle weakness and myalgias.   Gastrointestinal: Positive for bloating, constipation and diarrhea.   Genitourinary: Positive for bladder incontinence and frequency.   Neurological: Positive for focal weakness, loss of balance and numbness.   Psychiatric/Behavioral: The patient has insomnia and is nervous/anxious.        Objective   Blood pressure (!) 201/99, pulse 91, resp. rate 18, height 152.4 cm (60\"), weight 91.6 kg (202 lb), SpO2 (!) 73 %.  Body mass index is 39.45 kg/m².        Physical Exam   Constitutional: She is oriented to person, place, and time. She appears well-developed and well-nourished.   HENT:   Head: Normocephalic.   Eyes: Conjunctivae and EOM are normal.   Neck: Normal range of motion. Neck supple. JVD present. No tracheal deviation present. No thyromegaly present.   Cardiovascular: Normal rate, regular rhythm, S1 normal and S2 normal.  Exam reveals no gallop, no S3, no S4 and no friction rub.    No murmur heard.  Pulmonary/Chest: No respiratory " distress. She has no wheezes. She has rales (Bilateral scattered rales).   Abdominal: Soft. Bowel sounds are normal. She exhibits no mass. There is no tenderness.   Musculoskeletal: She exhibits edema.   Neurological: She is alert and oriented to person, place, and time. No cranial nerve deficit.   Skin: Skin is warm and dry.   Psychiatric: She has a normal mood and affect.          Lab Results   Component Value Date    .0 (H) 10/06/2017         ECG 12 Lead  Date/Time: 4/2/2018 11:23 AM  Performed by: MIKE BARRETT  Authorized by: MIKE BARRETT   Rhythm: sinus rhythm  Comments: Nonspecific ST-T wave changes noted in the precordial leads.            Assessment/Plan   Diagnoses and all orders for this visit:    Acute decompensated heart failure  Simple chronic bronchitis  Uncontrolled hypertension.    Recommendations:     We will admit her to the hospital for further evaluation and management of her possible acute decompensated heart failure.    No Follow-up on file.    As always, I appreciate very much the opportunity to participate in the cardiovascular care of your patients.      With Best Regards,    Mike Barrett MD, FACC

## 2018-04-03 ENCOUNTER — APPOINTMENT (OUTPATIENT)
Dept: CARDIOLOGY | Facility: HOSPITAL | Age: 53
End: 2018-04-03
Attending: INTERNAL MEDICINE

## 2018-04-03 LAB
ALBUMIN SERPL-MCNC: 3.4 G/DL (ref 3.5–5)
ALBUMIN/GLOB SERPL: 0.9 G/DL (ref 1.5–2.5)
ALP SERPL-CCNC: 89 U/L (ref 35–104)
ALT SERPL W P-5'-P-CCNC: 19 U/L (ref 10–36)
ANION GAP SERPL CALCULATED.3IONS-SCNC: 2.5 MMOL/L (ref 3.6–11.2)
AST SERPL-CCNC: 27 U/L (ref 10–30)
BH CV ECHO MEAS - % IVS THICK: 59.5 %
BH CV ECHO MEAS - % LVPW THICK: 31.3 %
BH CV ECHO MEAS - ACS: 2.2 CM
BH CV ECHO MEAS - AO MAX PG: 9.8 MMHG
BH CV ECHO MEAS - AO MEAN PG: 4.6 MMHG
BH CV ECHO MEAS - AO ROOT AREA (BSA CORRECTED): 1.6
BH CV ECHO MEAS - AO ROOT AREA: 7.1 CM^2
BH CV ECHO MEAS - AO ROOT DIAM: 3 CM
BH CV ECHO MEAS - AO V2 MAX: 156.9 CM/SEC
BH CV ECHO MEAS - AO V2 MEAN: 94.4 CM/SEC
BH CV ECHO MEAS - AO V2 VTI: 28.3 CM
BH CV ECHO MEAS - BSA(HAYCOCK): 1.9 M^2
BH CV ECHO MEAS - BSA: 1.9 M^2
BH CV ECHO MEAS - BZI_BMI: 32.2 KILOGRAMS/M^2
BH CV ECHO MEAS - BZI_METRIC_HEIGHT: 160 CM
BH CV ECHO MEAS - BZI_METRIC_WEIGHT: 82.6 KG
BH CV ECHO MEAS - CONTRAST EF 4CH: 60.5 ML/M^2
BH CV ECHO MEAS - EDV(CUBED): 86.4 ML
BH CV ECHO MEAS - EDV(MOD-SP4): 43 ML
BH CV ECHO MEAS - EDV(TEICH): 88.7 ML
BH CV ECHO MEAS - EF(CUBED): 71.7 %
BH CV ECHO MEAS - EF(TEICH): 63.6 %
BH CV ECHO MEAS - ESV(CUBED): 24.4 ML
BH CV ECHO MEAS - ESV(MOD-SP4): 17 ML
BH CV ECHO MEAS - ESV(TEICH): 32.3 ML
BH CV ECHO MEAS - FS: 34.4 %
BH CV ECHO MEAS - IVS/LVPW: 0.85
BH CV ECHO MEAS - IVSD: 1.2 CM
BH CV ECHO MEAS - IVSS: 1.8 CM
BH CV ECHO MEAS - LA DIMENSION: 3.7 CM
BH CV ECHO MEAS - LA/AO: 1.2
BH CV ECHO MEAS - LV DIASTOLIC VOL/BSA (35-75): 23.1 ML/M^2
BH CV ECHO MEAS - LV MASS(C)D: 206.8 GRAMS
BH CV ECHO MEAS - LV MASS(C)DI: 111.3 GRAMS/M^2
BH CV ECHO MEAS - LV MASS(C)S: 212.5 GRAMS
BH CV ECHO MEAS - LV MASS(C)SI: 114.4 GRAMS/M^2
BH CV ECHO MEAS - LV SYSTOLIC VOL/BSA (12-30): 9.2 ML/M^2
BH CV ECHO MEAS - LVIDD: 4.4 CM
BH CV ECHO MEAS - LVIDS: 2.9 CM
BH CV ECHO MEAS - LVLD AP4: 7.4 CM
BH CV ECHO MEAS - LVLS AP4: 6.3 CM
BH CV ECHO MEAS - LVOT AREA (M): 2.3 CM^2
BH CV ECHO MEAS - LVOT AREA: 2.2 CM^2
BH CV ECHO MEAS - LVOT DIAM: 1.7 CM
BH CV ECHO MEAS - LVPWD: 1.4 CM
BH CV ECHO MEAS - LVPWS: 1.8 CM
BH CV ECHO MEAS - MV A MAX VEL: 104.2 CM/SEC
BH CV ECHO MEAS - MV E MAX VEL: 109.7 CM/SEC
BH CV ECHO MEAS - MV E/A: 1.1
BH CV ECHO MEAS - PA ACC SLOPE: 1432 CM/SEC^2
BH CV ECHO MEAS - PA ACC TIME: 0.1 SEC
BH CV ECHO MEAS - PA PR(ACCEL): 34.6 MMHG
BH CV ECHO MEAS - RAP SYSTOLE: 10 MMHG
BH CV ECHO MEAS - RVSP: 51.7 MMHG
BH CV ECHO MEAS - SI(AO): 107.7 ML/M^2
BH CV ECHO MEAS - SI(CUBED): 33.4 ML/M^2
BH CV ECHO MEAS - SI(MOD-SP4): 14 ML/M^2
BH CV ECHO MEAS - SI(TEICH): 30.4 ML/M^2
BH CV ECHO MEAS - SV(AO): 200.2 ML
BH CV ECHO MEAS - SV(CUBED): 62 ML
BH CV ECHO MEAS - SV(MOD-SP4): 26 ML
BH CV ECHO MEAS - SV(TEICH): 56.4 ML
BH CV ECHO MEAS - TR MAX VEL: 322.8 CM/SEC
BILIRUB SERPL-MCNC: 1.3 MG/DL (ref 0.2–1.8)
BUN BLD-MCNC: 7 MG/DL (ref 7–21)
BUN/CREAT SERPL: 10.8 (ref 7–25)
CALCIUM SPEC-SCNC: 9.5 MG/DL (ref 7.7–10)
CHLORIDE SERPL-SCNC: 101 MMOL/L (ref 99–112)
CHOLEST SERPL-MCNC: 109 MG/DL (ref 0–200)
CO2 SERPL-SCNC: 39.5 MMOL/L (ref 24.3–31.9)
CREAT BLD-MCNC: 0.65 MG/DL (ref 0.43–1.29)
DEPRECATED RDW RBC AUTO: 67 FL (ref 37–54)
ERYTHROCYTE [DISTWIDTH] IN BLOOD BY AUTOMATED COUNT: 19.2 % (ref 11.5–14.5)
GFR SERPL CREATININE-BSD FRML MDRD: 96 ML/MIN/1.73
GLOBULIN UR ELPH-MCNC: 3.8 GM/DL
GLUCOSE BLD-MCNC: 84 MG/DL (ref 70–110)
HCT VFR BLD AUTO: 56.6 % (ref 37–47)
HDLC SERPL-MCNC: 35 MG/DL (ref 60–100)
HGB BLD-MCNC: 17.8 G/DL (ref 12–16)
LDLC SERPL CALC-MCNC: 60 MG/DL (ref 0–100)
LDLC/HDLC SERPL: 1.7 {RATIO}
MAXIMAL PREDICTED HEART RATE: 170 BPM
MCH RBC QN AUTO: 31.1 PG (ref 27–33)
MCHC RBC AUTO-ENTMCNC: 31.4 G/DL (ref 33–37)
MCV RBC AUTO: 99 FL (ref 80–94)
OSMOLALITY SERPL CALC.SUM OF ELEC: 282.1 MOSM/KG (ref 273–305)
PLATELET # BLD AUTO: 89 10*3/MM3 (ref 130–400)
PMV BLD AUTO: 12.4 FL (ref 6–10)
POTASSIUM BLD-SCNC: 4.1 MMOL/L (ref 3.5–5.3)
PROT SERPL-MCNC: 7.2 G/DL (ref 6–8)
RBC # BLD AUTO: 5.72 10*6/MM3 (ref 4.2–5.4)
SODIUM BLD-SCNC: 143 MMOL/L (ref 135–153)
STRESS TARGET HR: 145 BPM
TRIGL SERPL-MCNC: 72 MG/DL (ref 0–150)
TROPONIN I SERPL-MCNC: 0.03 NG/ML
TROPONIN I SERPL-MCNC: 0.04 NG/ML
VLDLC SERPL-MCNC: 14.4 MG/DL
WBC NRBC COR # BLD: 5.24 10*3/MM3 (ref 4.5–12.5)

## 2018-04-03 PROCEDURE — 93306 TTE W/DOPPLER COMPLETE: CPT | Performed by: INTERNAL MEDICINE

## 2018-04-03 PROCEDURE — 94799 UNLISTED PULMONARY SVC/PX: CPT

## 2018-04-03 PROCEDURE — 85027 COMPLETE CBC AUTOMATED: CPT | Performed by: INTERNAL MEDICINE

## 2018-04-03 PROCEDURE — 25010000002 ENOXAPARIN PER 10 MG: Performed by: INTERNAL MEDICINE

## 2018-04-03 PROCEDURE — 80061 LIPID PANEL: CPT | Performed by: INTERNAL MEDICINE

## 2018-04-03 PROCEDURE — 93306 TTE W/DOPPLER COMPLETE: CPT

## 2018-04-03 PROCEDURE — 99223 1ST HOSP IP/OBS HIGH 75: CPT | Performed by: INTERNAL MEDICINE

## 2018-04-03 PROCEDURE — 80053 COMPREHEN METABOLIC PANEL: CPT | Performed by: INTERNAL MEDICINE

## 2018-04-03 PROCEDURE — 84484 ASSAY OF TROPONIN QUANT: CPT | Performed by: INTERNAL MEDICINE

## 2018-04-03 RX ORDER — BUMETANIDE 0.25 MG/ML
2 INJECTION INTRAMUSCULAR; INTRAVENOUS EVERY 12 HOURS
Status: DISCONTINUED | OUTPATIENT
Start: 2018-04-03 | End: 2018-04-04

## 2018-04-03 RX ORDER — SPIRONOLACTONE 25 MG/1
25 TABLET ORAL DAILY
Status: DISCONTINUED | OUTPATIENT
Start: 2018-04-03 | End: 2018-04-05 | Stop reason: HOSPADM

## 2018-04-03 RX ORDER — IPRATROPIUM BROMIDE AND ALBUTEROL SULFATE 2.5; .5 MG/3ML; MG/3ML
3 SOLUTION RESPIRATORY (INHALATION)
Status: DISCONTINUED | OUTPATIENT
Start: 2018-04-03 | End: 2018-04-05 | Stop reason: HOSPADM

## 2018-04-03 RX ADMIN — AMLODIPINE BESYLATE 10 MG: 10 TABLET ORAL at 08:34

## 2018-04-03 RX ADMIN — BUMETANIDE 2 MG: 0.25 INJECTION, SOLUTION INTRAMUSCULAR; INTRAVENOUS at 18:20

## 2018-04-03 RX ADMIN — SPIRONOLACTONE 25 MG: 25 TABLET, FILM COATED ORAL at 18:20

## 2018-04-03 RX ADMIN — BUMETANIDE 2 MG: 0.25 INJECTION INTRAMUSCULAR; INTRAVENOUS at 04:04

## 2018-04-03 RX ADMIN — ENOXAPARIN SODIUM 40 MG: 40 INJECTION SUBCUTANEOUS at 08:34

## 2018-04-03 RX ADMIN — IPRATROPIUM BROMIDE AND ALBUTEROL SULFATE 3 ML: .5; 3 SOLUTION RESPIRATORY (INHALATION) at 19:10

## 2018-04-03 NOTE — SIGNIFICANT NOTE
Pt was wearing home unit, sats were dropping. Tried to turn O2 up on pt's home unit and she was refusing. She stated she did not want to the oxygen turned up.

## 2018-04-03 NOTE — PLAN OF CARE
Problem: Patient Care Overview  Goal: Plan of Care Review  Outcome: Ongoing (interventions implemented as appropriate)    Goal: Individualization and Mutuality  Outcome: Ongoing (interventions implemented as appropriate)      Problem: Fall Risk (Adult)  Goal: Identify Related Risk Factors and Signs and Symptoms  Outcome: Ongoing (interventions implemented as appropriate)    Goal: Absence of Fall  Outcome: Ongoing (interventions implemented as appropriate)      Problem: Cardiac: Heart Failure (Adult)  Goal: Signs and Symptoms of Listed Potential Problems Will be Absent, Minimized or Managed (Cardiac: Heart Failure)  Outcome: Ongoing (interventions implemented as appropriate)

## 2018-04-03 NOTE — H&P
History and Physical:  Date of Admit: 4/2/2018    Patient Active Problem List   Diagnosis   • Acute heart failure   • Acute decompensated heart failure   • COPD (chronic obstructive pulmonary disease)   • Uncontrolled hypertension         Chief complaint: Increasing shortness of breath    Subjective     History of Present Illness Ms. Berkowizt is a pleasant 52-year-old  female presenting with complaints of having progressive worsening dyspnea for the last 6 months and progressive weight gain and leg swelling.  On recent chest x-ray performed at her PCPs office she was apparently noted to have cardiomegaly and possible congestive heart failure.  She is here for further cardiac evaluation and management.  She also has underlying significant COPD and has been on home oxygen.  She she has some intermittent chest pressure when she gets short of breath.  She has a long history of smoking up to 3 packs a day but currently has cut down to 8 cigarettes a day.  She is nondiabetic.  She has not had any previous history of known coronary artery disease.  No previous history of congestive heart failure.  She has gained about 28 pounds since October 2017.  She is admitted with a diagnosis of possible acute decompensated heart failure.      Past Medical History:   Diagnosis Date   • Arthritis    • Asthma    • CHF (congestive heart failure)    • COPD (chronic obstructive pulmonary disease)    • Coronary artery disease    • Diabetes mellitus    • Hypertension      Past Surgical History:   Procedure Laterality Date   • HYSTERECTOMY       Family History   Problem Relation Age of Onset   • Breast cancer Paternal Aunt    • Heart disease Mother    • Heart disease Father    • Heart disease Sister    • Heart disease Brother      Social History   Substance Use Topics   • Smoking status: Current Every Day Smoker     Packs/day: 0.25     Years: 15.00     Types: Cigarettes   • Smokeless tobacco: Not on file   • Alcohol use No        Prescriptions Prior to Admission   Medication Sig Dispense Refill Last Dose   • albuterol (PROVENTIL HFA;VENTOLIN HFA) 108 (90 Base) MCG/ACT inhaler Inhale 2 puffs Every 4 (Four) Hours As Needed for Wheezing.   Unknown at Unknown time   • albuterol (PROVENTIL) (2.5 MG/3ML) 0.083% nebulizer solution Take 2.5 mg by nebulization Every 4 (Four) Hours As Needed for Wheezing.   Unknown at Unknown time       Allergies:  Amoxicillin; Codeine; Latex; and Rocephin [ceftriaxone]    Review of Systems  Constitution: Positive for malaise/fatigue.   Cardiovascular: Positive for dyspnea on exertion, leg swelling and palpitations.   Respiratory: Positive for shortness of breath (on O2, uses nebulizer and inhalers).    Hematologic/Lymphatic: Bruises/bleeds easily.   Skin: Positive for itching and rash.   Musculoskeletal: Positive for back pain, joint pain, muscle cramps, muscle weakness and myalgias.   Gastrointestinal: Positive for bloating, constipation and diarrhea.   Genitourinary: Positive for bladder incontinence and frequency.   Neurological: Positive for focal weakness, loss of balance and numbness.   Psychiatric/Behavioral: The patient has insomnia and is nervous/anxious.        Objective      Vital Signs  Temp:  [98 °F (36.7 °C)-98.3 °F (36.8 °C)] 98 °F (36.7 °C)  Heart Rate:  [] 89  Resp:  [18-20] 20  BP: (134-201)/() 134/64  Body mass index is 32.35 kg/m².    Intake/Output Summary (Last 24 hours) at 04/03/18 0754  Last data filed at 04/03/18 0300   Gross per 24 hour   Intake              480 ml   Output                0 ml   Net              480 ml       Physical Exam   Constitutional: She appears well-developed and well-nourished.   Middle-age  female who is alert, oriented ×3 and is in mild to moderate respiratory distress with tachypnea on oxygen by nasal cannula.   HENT:   Head: Normocephalic and atraumatic.   Eyes: EOM are normal. No scleral icterus.   Neck: JVD present. No tracheal  deviation present. No thyromegaly present.   Cardiovascular: Normal rate, regular rhythm, S1 normal, S2 normal and normal heart sounds.  Exam reveals no gallop, no S3, no S4 and no friction rub.    No murmur heard.  Pulses:       Dorsalis pedis pulses are 1+ on the right side, and 1+ on the left side.        Posterior tibial pulses are 1+ on the right side, and 1+ on the left side.   Pulmonary/Chest: No respiratory distress. She has wheezes (mild expiratory wheezing bilaterally.). She has rales (bilateral scattered rales).   Abdominal: She exhibits no distension and no mass. There is no tenderness. There is no guarding.   Musculoskeletal: She exhibits edema (2-3+ edema on both legs).   Neurological: She is alert. No cranial nerve deficit.   No focal neurological deficits noted.   Skin: Skin is warm and dry.        Results Review:    I reviewed the patient's new clinical results.    Results from last 7 days  Lab Units 04/03/18  0458 04/03/18  0037 04/02/18  1745   TROPONIN I ng/mL 0.040 0.041* 0.047*       Results from last 7 days  Lab Units 04/03/18  0037 04/02/18  1336   WBC 10*3/mm3 5.24 5.31   HEMOGLOBIN g/dL 17.8* 17.5*   PLATELETS 10*3/mm3 89* 92*       Results from last 7 days  Lab Units 04/03/18  0037 04/02/18  1336   SODIUM mmol/L 143 147   POTASSIUM mmol/L 4.1 3.5   CHLORIDE mmol/L 101 102   CO2 mmol/L 39.5* 37.1*   BUN mg/dL 7 8   CREATININE mg/dL 0.65 0.64   CALCIUM mg/dL 9.5 9.2   GLUCOSE mg/dL 84 84   ALT (SGPT) U/L 19 16   AST (SGOT) U/L 27 22     Lab Results   Component Value Date    INR 0.96 10/23/2017    INR 0.95 10/06/2017     No results found for: MG  Lab Results   Component Value Date    TSH 1.060 11/16/2015    CHLPL 157 11/16/2015    TRIG 72 04/03/2018    HDL 35 (L) 04/03/2018    LDL 60 04/03/2018      Lab Results   Component Value Date    .0 (H) 04/02/2018    .0 (H) 10/06/2017    .0 (H) 12/23/2016     I have reviewed the chest x-ray and it is consistent with acute  decompensated heart failure with cardiomegaly.    Assessment/Plan    Assessment:   1. Acute decompensated heart failure(diastolic plus minus systolic).  2. Uncontrolled hypertension.  3. Advance COPD, on home oxygen.  4. Tobacco abuse  5. Indeterminate range troponin probably from acute heart failure.      Assessment & Plan   Plan:   1. Patient admitted for further evaluation and management of her acute heart failure.  2. She'll be receiving IV diuretics and will be started on antihypertensive medications as needed.  3. Follow up on serial cardiac markers.  4. Monitor the urine output and kidney function and adjust the dose of diuretics as needed.  5. Obtain echo Doppler study to evaluate her LV function and tailor further therapy accordingly.  6. I strongly emphasized for her to quit smoking and explained the risks of continued smoking.      Mike Garcia MD,[facility]  04/03/18  7:54 AM    Dragon disclaimer:  Much of this encounter note is an electronic transcription/translation of spoken language to printed text. The electronic translation of spoken language may permit erroneous, or at times, nonsensical words or phrases to be inadvertently transcribed; Although I have reviewed the note for such errors, some may still exist.

## 2018-04-03 NOTE — PROGRESS NOTES
LOS: 1 day     Name: Cristine Berkowitz  Age/Sex: 52 y.o. female  :  1965        PCP: ALONDRA Lott  REF: Mike Garcia MD    Active Problems:    Acute decompensated heart failure      Reason for follow-up: Acute decompensated heart failure.    Shivam Berkowitz is a pleasant 52-year-old  female presenting with complaints of having progressive worsening dyspnea for the last 6 months and progressive weight gain and leg swelling.  On recent chest x-ray performed at her PCPs office she was apparently noted to have cardiomegaly and possible congestive heart failure.  She is here for further cardiac evaluation and management.  She also has underlying significant COPD and has been on home oxygen.  She she has some intermittent chest pressure when she gets short of breath.  She has a long history of smoking up to 3 packs a day but currently has cut down to 8 cigarettes a day.  She is nondiabetic.  She has not had any previous history of known coronary artery disease.  No previous history of congestive heart failure.  She has gained about 28 pounds since 2017.  She is admitted with a diagnosis of possible acute decompensated heart failure.      Interval History: She says that she is breathing a lot better.  She had apparently good urine output with the IV diuretics although the intake and output record does not reflect this.    ROS    Vital Signs  Temp:  [98 °F (36.7 °C)-98.4 °F (36.9 °C)] 98.4 °F (36.9 °C)  Heart Rate:  [] 96  Resp:  [16-24] 16  BP: (117-182)/(55-95) 117/55  Vital Signs (last 72 hrs)        0700  -  04/ 0659 04/ 0700  -  04/02 0659 04/02 0700  -  04/03 0659 04/03 0700  -  04/03 1700   Most Recent    Temp (°F)     98.2 -  98.3    98 -  98.4     98.4 (36.9)    Heart Rate     91 -  100    89 -  102     96    Resp     18 -  20    16 -  24     16    BP     156/88 -  (!) 201/99    117/55 -  156/65     117/55    SpO2 (%)     (!)73 -  92    90 -  98      98        Body mass index is 32.35 kg/m².    Intake/Output Summary (Last 24 hours) at 04/03/18 1700  Last data filed at 04/03/18 1500   Gross per 24 hour   Intake              960 ml   Output                0 ml   Net              960 ml     Objective        Physical Exam:     General Appearance:    Alert, cooperative, in no acute distress   Head:    Normocephalic, without obvious abnormality, atraumatic   Eyes:            Conjunctivae and sclerae normal, no   icterus, no pallor, corneas clear.   Neck:   No adenopathy, supple, trachea midline, no thyromegaly, no   carotid bruit,  JVDPresent    Lungs:     Rales at both bases,respirations regular, even and                  unlabored    Heart:    Regular rhythm and normal rate, normal S1 and S2, no            murmur, no gallop, no rub, no click   Chest Wall:    No abnormalities observed   Abdomen:     Normal bowel sounds, no masses, no organomegaly, soft        non-tender, non-distended, no guarding, no rebound                tenderness   Extremities:   Moves all extremities well, 2+ edema on both lower extremities which seems to be doing better, no cyanosis, no       Pulses:   Pulses palpable 1+ and equal bilaterally   Skin:   No bleeding, bruising or rash              Procedures    Results Review:     Results from last 7 days  Lab Units 04/03/18  0037 04/02/18  1336   WBC 10*3/mm3 5.24 5.31   HEMOGLOBIN g/dL 17.8* 17.5*   PLATELETS 10*3/mm3 89* 92*       Results from last 7 days  Lab Units 04/03/18  0037 04/02/18  1336   SODIUM mmol/L 143 147   POTASSIUM mmol/L 4.1 3.5   CHLORIDE mmol/L 101 102   CO2 mmol/L 39.5* 37.1*   BUN mg/dL 7 8   CREATININE mg/dL 0.65 0.64   CALCIUM mg/dL 9.5 9.2   GLUCOSE mg/dL 84 84   ALT (SGPT) U/L 19 16   AST (SGOT) U/L 27 22       Results from last 7 days  Lab Units 04/03/18  1205 04/03/18  0458 04/03/18  0037 04/02/18  1745   TROPONIN I ng/mL 0.039 0.040 0.041* 0.047*     Lab Results   Component Value Date    INR 0.96 10/23/2017    INR  0.95 10/06/2017     No results found for: MG  Lab Results   Component Value Date    TSH 1.060 11/16/2015    CHLPL 157 11/16/2015    TRIG 72 04/03/2018    HDL 35 (L) 04/03/2018    LDL 60 04/03/2018      Lab Results   Component Value Date    .0 (H) 04/02/2018    .0 (H) 10/06/2017    .0 (H) 12/23/2016         ECG      Echo   Interpretation Summary     · Normal left ventricular cavity size and wall thickness noted. All left ventricular wall segments contract normally.  · Estimated EF appears to be in the range of 61 - 65%.  · The aortic valve is structurally normal. No aortic valve regurgitation is present. No aortic valve stenosis is present.  · The mitral valve is normal in structure. No mitral valve regurgitation is present. No significant mitral valve stenosis is present.  · The tricuspid valve is normal. No tricuspid valve stenosis is present. Mild tricuspid valve regurgitation is present. Estimated right ventricular systolic pressure from tricuspid regurgitation is moderately elevated (45-55 mmHg).  · Moderate pulmonary hypertension is present.  · There is no evidence of pericardial effusion.          I reviewed the patient's new clinical results.    Telemetry:Sinus 80s to 90s.       Medication Review:     amLODIPine 10 mg Oral Q24H   bumetanide 2 mg Intravenous Q12H   enoxaparin 40 mg Subcutaneous Daily          Assessment:  1. Acute diastolic heart failure, seems to be doing better.  2. Uncontrolled hypertension.  3. COPD.  4. Tobacco abuse.    Recommendations:  1. Add spironolactone which would help her hypertension as well as acute diastolic heart failure.  2. Continue with IV Bumex and continue to monitor her kidney function  3. Obtain strict intake and output record.  4. Resume including neb treatments.  5. I reemphasized for her to quit smoking.  6. Continue to adjust her antihypertensive medications as needed.  7. We'll evaluate for any occult myocardial ischemia with a nuclear stress  test in a couple of days when her breathing improves adequately.    I discussed the patients findings and my recommendations with patient.        SANDRA Brand  04/03/18  5:00 PM    Dragon disclaimer:  Much of this encounter note is an electronic transcription/translation of spoken language to printed text. The electronic translation of spoken language may permit erroneous, or at times, nonsensical words or phrases to be inadvertently transcribed; Although I have reviewed the note for such errors, some may still exist.

## 2018-04-03 NOTE — NURSING NOTE
Tele tech called and stated that patients O2 sat was staying in 70s. Assessed patient and patients O2 sat was reading 85%. She was on her bipap at this time but it was loose on her face. I educated the patient that I needed to tighten the bipap mask up and she stated no it gives her a headache and asked if she could just go back on nasal cannula. I put patient on 4.5 LMP and her O2 sat is at 91%.

## 2018-04-03 NOTE — PAYOR COMM NOTE
"  [facility]  NPI: [id number]    Utilization Review   Contact:Jazz JACKSON, RN  Phone: [phone]  Fax: [phone]    [facility]/Attn: milton Dykes Req  REF: [id number]  Dx: I50.9, J44.1  Andrea Gilmore (52 y.o. Female)     Date of Birth Social Security Number Address Home Phone MRN    1965  [address] [place] [address] [phone] [id number]    Jain Marital Status          Confucianist        Admission Date Admission Type Admitting Provider Attending Provider Department, Room/Bed    4/2/18 Elective Mike Garcia MD  NPI [id number] Mike Garcia MD [address], 3346/1S    Discharge Date Discharge Disposition Discharge Destination                       Attending Provider:  Mike Garcia MD    Allergies:  Amoxicillin, Codeine, Latex, Rocephin [Ceftriaxone]    Isolation:  None   Infection:  None   Code Status:  FULL    Ht:  160 cm (62.99\")   Wt:  82.8 kg (182 lb 9.6 oz)    Admission Cmt:  None   Principal Problem:  None                Active Insurance as of 4/2/2018     Primary Coverage     Payor Plan Insurance Group Employer/Plan Group    [employer] [place] [employer]      Payor Plan Address Payor Plan Phone Number Effective From Effective To    [address] [address] [phone] 11/11/2016     [place] [address]       Subscriber Name Subscriber Birth Date Member ID       ANDREA GILMORE 1965 [id number]                 Emergency Contacts      (Rel.) Home Phone Work Phone Mobile Phone    Jhon Oropeza (Other) [phone] -- --               History & Physical      Mike Garcia MD at 4/3/2018  7:53 AM          History and Physical:  Date of Admit: 4/2/2018    Patient Active Problem List   Diagnosis   • Acute heart failure   • Acute decompensated heart failure   • COPD (chronic obstructive pulmonary disease)   • Uncontrolled hypertension         Chief complaint: Increasing shortness of breath    Subjective "     History of Present Illness Ms. Berkowitz is a pleasant 52-year-old  female presenting with complaints of having progressive worsening dyspnea for the last 6 months and progressive weight gain and leg swelling.  On recent chest x-ray performed at her PCPs office she was apparently noted to have cardiomegaly and possible congestive heart failure.  She is here for further cardiac evaluation and management.  She also has underlying significant COPD and has been on home oxygen.  She she has some intermittent chest pressure when she gets short of breath.  She has a long history of smoking up to 3 packs a day but currently has cut down to 8 cigarettes a day.  She is nondiabetic.  She has not had any previous history of known coronary artery disease.  No previous history of congestive heart failure.  She has gained about 28 pounds since October 2017.  She is admitted with a diagnosis of possible acute decompensated heart failure.      Past Medical History:   Diagnosis Date   • Arthritis    • Asthma    • CHF (congestive heart failure)    • COPD (chronic obstructive pulmonary disease)    • Coronary artery disease    • Diabetes mellitus    • Hypertension      Past Surgical History:   Procedure Laterality Date   • HYSTERECTOMY       Family History   Problem Relation Age of Onset   • Breast cancer Paternal Aunt    • Heart disease Mother    • Heart disease Father    • Heart disease Sister    • Heart disease Brother      Social History   Substance Use Topics   • Smoking status: Current Every Day Smoker     Packs/day: 0.25     Years: 15.00     Types: Cigarettes   • Smokeless tobacco: Not on file   • Alcohol use No       Prescriptions Prior to Admission   Medication Sig Dispense Refill Last Dose   • albuterol (PROVENTIL HFA;VENTOLIN HFA) 108 (90 Base) MCG/ACT inhaler Inhale 2 puffs Every 4 (Four) Hours As Needed for Wheezing.   Unknown at Unknown time   • albuterol (PROVENTIL) (2.5 MG/3ML) 0.083% nebulizer solution Take  2.5 mg by nebulization Every 4 (Four) Hours As Needed for Wheezing.   Unknown at Unknown time       Allergies:  Amoxicillin; Codeine; Latex; and Rocephin [ceftriaxone]    Review of Systems  Constitution: Positive for malaise/fatigue.   Cardiovascular: Positive for dyspnea on exertion, leg swelling and palpitations.   Respiratory: Positive for shortness of breath (on O2, uses nebulizer and inhalers).    Hematologic/Lymphatic: Bruises/bleeds easily.   Skin: Positive for itching and rash.   Musculoskeletal: Positive for back pain, joint pain, muscle cramps, muscle weakness and myalgias.   Gastrointestinal: Positive for bloating, constipation and diarrhea.   Genitourinary: Positive for bladder incontinence and frequency.   Neurological: Positive for focal weakness, loss of balance and numbness.   Psychiatric/Behavioral: The patient has insomnia and is nervous/anxious.        Objective      Vital Signs  Temp:  [98 °F (36.7 °C)-98.3 °F (36.8 °C)] 98 °F (36.7 °C)  Heart Rate:  [] 89  Resp:  [18-20] 20  BP: (134-201)/() 134/64  Body mass index is 32.35 kg/m².    Intake/Output Summary (Last 24 hours) at 04/03/18 0754  Last data filed at 04/03/18 0300   Gross per 24 hour   Intake              480 ml   Output                0 ml   Net              480 ml       Physical Exam   Constitutional: She appears well-developed and well-nourished.   HENT:   Head: Normocephalic and atraumatic.   Eyes: EOM are normal. No scleral icterus.   Neck: JVD present. No tracheal deviation present. No thyromegaly present.   Cardiovascular: Normal rate, regular rhythm, S1 normal, S2 normal and normal heart sounds.  Exam reveals no gallop, no S3, no S4 and no friction rub.    No murmur heard.  Pulses:       Dorsalis pedis pulses are 1+ on the right side, and 1+ on the left side.        Posterior tibial pulses are 1+ on the right side, and 1+ on the left side.   Pulmonary/Chest: No respiratory distress. She has no wheezes. She has no  rales.   Abdominal: She exhibits no distension and no mass. There is no tenderness. There is no guarding.   Musculoskeletal: She exhibits edema (2-3+ edema on both legs).   Neurological: She is alert. No cranial nerve deficit.   Skin: Skin is warm and dry.        Results Review:    I reviewed the patient's new clinical results.    Results from last 7 days  Lab Units 04/03/18  0458 04/03/18  0037 04/02/18  1745   TROPONIN I ng/mL 0.040 0.041* 0.047*       Results from last 7 days  Lab Units 04/03/18  0037 04/02/18  1336   WBC 10*3/mm3 5.24 5.31   HEMOGLOBIN g/dL 17.8* 17.5*   PLATELETS 10*3/mm3 89* 92*       Results from last 7 days  Lab Units 04/03/18  0037 04/02/18  1336   SODIUM mmol/L 143 147   POTASSIUM mmol/L 4.1 3.5   CHLORIDE mmol/L 101 102   CO2 mmol/L 39.5* 37.1*   BUN mg/dL 7 8   CREATININE mg/dL 0.65 0.64   CALCIUM mg/dL 9.5 9.2   GLUCOSE mg/dL 84 84   ALT (SGPT) U/L 19 16   AST (SGOT) U/L 27 22     Lab Results   Component Value Date    INR 0.96 10/23/2017    INR 0.95 10/06/2017     No results found for: MG  Lab Results   Component Value Date    TSH 1.060 11/16/2015    CHLPL 157 11/16/2015    TRIG 72 04/03/2018    HDL 35 (L) 04/03/2018    LDL 60 04/03/2018      Lab Results   Component Value Date    .0 (H) 04/02/2018    .0 (H) 10/06/2017    .0 (H) 12/23/2016       Assessment/Plan    Assessment:   1. Acute decompensated heart failure(diastolic plus minus systolic).  2. Uncontrolled hypertension.  3. Advance COPD, on home oxygen.  4. Tobacco abuse      Assessment & Plan   Plan:   1. Patient admitted for further evaluation and management of her acute heart failure.  2. She'll be receiving IV diuretics and will be started on antihypertensive medications as needed.  3. Follow up on serial cardiac markers.  4. Monitor the urine output and kidney function and adjust the dose of diuretics as needed.  5. Obtain echo Doppler study to evaluate her LV function and tailor further therapy  accordingly.  6. I strongly emphasized for her to quit smoking and explained the risks of continued smoking.      Mike Garcia MD,[facility]  04/03/18  7:54 AM    Dragon disclaimer:  Much of this encounter note is an electronic transcription/translation of spoken language to printed text. The electronic translation of spoken language may permit erroneous, or at times, nonsensical words or phrases to be inadvertently transcribed; Although I have reviewed the note for such errors, some may still exist.    Scheduled Meds Sorted by Name   for Cristine Berkowitz as of 4/1/18 through 4/3/18     1 Day 3 Days 7 Days 10 Days < Today >    Legend:                           Inactive     Active     Other Encounter    Linked               Medications 04/01/18 04/02/18 04/03/18   amLODIPine (NORVASC) tablet 10 mg  Dose: 10 mg Freq: Every 24 Hours Scheduled Route: PO  Start: 04/02/18 1715    Admin Instructions:   Avoid grapefruit juice.     1735 0687              bumetanide (BUMEX) injection 2 mg  Dose: 2 mg Freq: Every 12 Hours Route: IV  Start: 04/02/18 1700 End: 04/04/18 0459    Admin Instructions:   Give slow IV push over 1-2 minutes.     5444 0909     1700            enoxaparin (LOVENOX) syringe 40 mg  Dose: 40 mg Freq: Daily Route: SC  Indications of Use: PROPHYLAXIS OF VENOUS THROMBOEMBOLISM  Start: 04/02/18 1500    Admin Instructions:   Give subcutaneous in abdomen only. Do not massage site after injection. Do not change dose time until after 48 hrs.     9924            0804                  Continuous Meds Sorted by Name   for Cristine Berkowitz as of 4/1/18 through 4/3/18    Legend:                           Inactive     Active     Other Encounter    Linked               Medications 04/01/18 04/02/18 04/03/18       PRN Meds Sorted by Name   for Cristine Berkowitz as of 4/1/18 through 4/3/18    Legend:                           Inactive     Active     Other Encounter    Linked               Medications  04/01/18 04/02/18 04/03/18   albuterol (PROVENTIL) nebulizer solution 0.083% 2.5 mg/3mL  Dose: 2.5 mg Freq: Every 4 Hours PRN Route: NEBULIZATION  PRN Reason: Wheezing  Start: 04/02/18 1343     1448               sodium chloride 0.9 % flush 1-10 mL  Dose: 1-10 mL Freq: As Needed Route: IV  PRN Reason: Line Care  Start: 04/02/18 1306                  Problem: Patient Care Overview  Goal: Discharge Needs Assessment  Outcome: Ongoing (interventions implemented as appropriate)  Discharge Planning Assessment   Steven     Patient Name: Cristine Berkowitz              MRN: [id number]  Today's Date: 4/2/2018                       Admit Date: 4/2/2018                       Discharge Needs Assessment      Row Name 04/02/18 1413           Living Environment     Lives With child(soraida), adult;child(soraida), dependent   Pt lives with 23 Y/O son and 10 Y/O sonAmadeo.      Able to Return to Prior Arrangements yes           Discharge Needs Assessment     Equipment Currently Used at Home bipap/cpap;oxygen;nebulizer   Pt has home oxygen @ 2 liters, nebulizer machine (has nebulizer medicine), and C-Pap machine from [facility].      Outpatient/Agency/Support Group Needs --   Pt does not utilize home health services.                               Discharge Plan      Row Name 04/02/18 1415           Plan     Plan Pt lives at home with 23 Y/O and 10 Y/O son's and plans to return home at discharge. Pt does not utilize home health services. Pt has home oxygen, nebulizer machine, and C-Pap machine from [facility]. PCP is NP, Afia Lopez and pt also see's Dr. Garcia and Dr. Church. Pt does not have a POA or advance directive. SS to follow and assist as needed with discharge planning.      Patient/Family in Agreement with Plan yes                  Demographic Summary      Row Name 04/02/18 1412           General Information     Referral Source nursing     Reason for Consult --   SS received consult d/c planning. SS spoke to pt  and sister in law.       Yi Karla Colin, RN Registered Nurse Signed   Nursing Note Date of Service: 4/3/2018  1:05 AM         ManualTemplate   Copied  Tele tech called and stated that patients O2 sat was staying in 70s. Assessed patient and patients O2 sat was reading 85%. She was on her bipap at this time but it was loose on her face. I educated the patient that I needed to tighten the bipap mask up and she stated no it gives her a headache and asked if she could just go back on nasal cannula. I put patient on 4.5 LMP and her O2 sat is at 91%.

## 2018-04-03 NOTE — PLAN OF CARE
Problem: Patient Care Overview  Goal: Plan of Care Review  Outcome: Ongoing (interventions implemented as appropriate)      Problem: Cardiac: Heart Failure (Adult)  Goal: Signs and Symptoms of Listed Potential Problems Will be Absent, Minimized or Managed (Cardiac: Heart Failure)  Outcome: Ongoing (interventions implemented as appropriate)

## 2018-04-03 NOTE — ACP (ADVANCE CARE PLANNING)
Patient approached today for an Advance Care Planning discussion. She is unwilling to receive information at this time and declines discussion today.     Isabelle Lerner, RN, BSN, PN  Advance Care Planning Facilitator  Ext: [phone]

## 2018-04-04 ENCOUNTER — APPOINTMENT (OUTPATIENT)
Dept: GENERAL RADIOLOGY | Facility: HOSPITAL | Age: 53
End: 2018-04-04

## 2018-04-04 LAB
A-A DO2: 232.3 MMHG (ref 0–300)
A-A DO2: 269.1 MMHG (ref 0–300)
ANION GAP SERPL CALCULATED.3IONS-SCNC: ABNORMAL MMOL/L (ref 3.6–11.2)
ANISOCYTOSIS BLD QL: NORMAL
ARTERIAL PATENCY WRIST A: ABNORMAL
ARTERIAL PATENCY WRIST A: POSITIVE
ATMOSPHERIC PRESS: 725 MMHG
ATMOSPHERIC PRESS: 725 MMHG
BASE EXCESS BLDA CALC-SCNC: 20.7 MMOL/L
BASE EXCESS BLDA CALC-SCNC: 20.7 MMOL/L
BASOPHILS # BLD AUTO: 0.02 10*3/MM3 (ref 0–0.3)
BASOPHILS NFR BLD AUTO: 0.3 % (ref 0–2)
BDY SITE: ABNORMAL
BDY SITE: ABNORMAL
BNP SERPL-MCNC: 356 PG/ML (ref 0–100)
BODY TEMPERATURE: 98.6 C
BODY TEMPERATURE: 98.6 C
BUN BLD-MCNC: 10 MG/DL (ref 7–21)
BUN/CREAT SERPL: 14.7 (ref 7–25)
CALCIUM SPEC-SCNC: 10 MG/DL (ref 7.7–10)
CHLORIDE SERPL-SCNC: 88 MMOL/L (ref 99–112)
CO2 SERPL-SCNC: >40 MMOL/L (ref 24.3–31.9)
COHGB MFR BLD: 2.2 % (ref 0–5)
COHGB MFR BLD: 2.8 % (ref 0–5)
CREAT BLD-MCNC: 0.68 MG/DL (ref 0.43–1.29)
CRP SERPL-MCNC: 0.56 MG/DL (ref 0–0.99)
D DIMER PPP FEU-MCNC: 2.75 MCGFEU/ML (ref 0–0.5)
D-LACTATE SERPL-SCNC: 1.3 MMOL/L (ref 0.5–2)
DEPRECATED RDW RBC AUTO: 65.6 FL (ref 37–54)
EOSINOPHIL # BLD AUTO: 0.23 10*3/MM3 (ref 0–0.7)
EOSINOPHIL NFR BLD AUTO: 3.9 % (ref 0–5)
EPAP: 6
ERYTHROCYTE [DISTWIDTH] IN BLOOD BY AUTOMATED COUNT: 18.5 % (ref 11.5–14.5)
GFR SERPL CREATININE-BSD FRML MDRD: 92 ML/MIN/1.73
GLUCOSE BLD-MCNC: 98 MG/DL (ref 70–110)
GLUCOSE BLDC GLUCOMTR-MCNC: 106 MG/DL (ref 70–130)
HBA1C MFR BLD: 6.5 % (ref 4.5–5.7)
HCO3 BLDA-SCNC: 51.9 MMOL/L (ref 22–26)
HCO3 BLDA-SCNC: 51.9 MMOL/L (ref 22–26)
HCT VFR BLD AUTO: 58 % (ref 37–47)
HCT VFR BLD CALC: 58 % (ref 37–47)
HCT VFR BLD CALC: 59 % (ref 37–47)
HGB BLD-MCNC: 17.9 G/DL (ref 12–16)
HGB BLDA-MCNC: 19.6 G/DL (ref 12–16)
HGB BLDA-MCNC: >20 G/DL (ref 12–16)
HOROWITZ INDEX BLD+IHG-RTO: 60 %
HOROWITZ INDEX BLD+IHG-RTO: 60 %
HYPOCHROMIA BLD QL: NORMAL
IMM GRANULOCYTES # BLD: 0.01 10*3/MM3 (ref 0–0.03)
IMM GRANULOCYTES NFR BLD: 0.2 % (ref 0–0.5)
INR PPP: 0.99 (ref 0.9–1.1)
IPAP: 18
L PNEUMO1 AG UR QL IA: NEGATIVE
LYMPHOCYTES # BLD AUTO: 1.01 10*3/MM3 (ref 1–3)
LYMPHOCYTES NFR BLD AUTO: 17.2 % (ref 21–51)
M PNEUMO IGM SER QL: NEGATIVE
MAGNESIUM SERPL-MCNC: 1.9 MG/DL (ref 1.7–2.6)
MCH RBC QN AUTO: 30.3 PG (ref 27–33)
MCHC RBC AUTO-ENTMCNC: 30.9 G/DL (ref 33–37)
MCV RBC AUTO: 98.1 FL (ref 80–94)
METHGB BLD QL: 0.4 % (ref 0–3)
METHGB BLD QL: 0.6 % (ref 0–3)
MODALITY: ABNORMAL
MODALITY: ABNORMAL
MONOCYTES # BLD AUTO: 0.68 10*3/MM3 (ref 0.1–0.9)
MONOCYTES NFR BLD AUTO: 11.6 % (ref 0–10)
NEUTROPHILS # BLD AUTO: 3.92 10*3/MM3 (ref 1.4–6.5)
NEUTROPHILS NFR BLD AUTO: 66.8 % (ref 30–70)
OSMOLALITY SERPL CALC.SUM OF ELEC: 280.3 MOSM/KG (ref 273–305)
OXYHGB MFR BLDV: 84.6 % (ref 85–100)
OXYHGB MFR BLDV: 93.7 % (ref 85–100)
PCO2 BLDA: 76.5 MM HG (ref 35–45)
PCO2 BLDA: 78.3 MM HG (ref 35–45)
PH BLDA: 7.44 PH UNITS (ref 7.35–7.45)
PH BLDA: 7.45 PH UNITS (ref 7.35–7.45)
PLATELET # BLD AUTO: 97 10*3/MM3 (ref 130–400)
PMV BLD AUTO: ABNORMAL FL (ref 6–10)
PO2 BLDA: 53.6 MM HG (ref 80–100)
PO2 BLDA: 88.4 MM HG (ref 80–100)
POTASSIUM BLD-SCNC: 3.2 MMOL/L (ref 3.5–5.3)
POTASSIUM BLD-SCNC: 4.5 MMOL/L (ref 3.5–5.3)
PROTHROMBIN TIME: 13.2 SECONDS (ref 11–15.4)
RBC # BLD AUTO: 5.91 10*6/MM3 (ref 4.2–5.4)
SAO2 % BLDCOA: 87.4 % (ref 90–100)
SAO2 % BLDCOA: 96.4 % (ref 90–100)
SET MECH RESP RATE: 20
SMALL PLATELETS BLD QL SMEAR: NORMAL
SODIUM BLD-SCNC: 141 MMOL/L (ref 135–153)
STOMATOCYTES BLD QL SMEAR: NORMAL
TROPONIN I SERPL-MCNC: 0.03 NG/ML
TROPONIN I SERPL-MCNC: 0.03 NG/ML
TROPONIN I SERPL-MCNC: 0.04 NG/ML
TROPONIN I SERPL-MCNC: 0.04 NG/ML
WBC NRBC COR # BLD: 5.87 10*3/MM3 (ref 4.5–12.5)

## 2018-04-04 PROCEDURE — 94660 CPAP INITIATION&MGMT: CPT

## 2018-04-04 PROCEDURE — 25010000002 METHYLPREDNISOLONE PER 125 MG: Performed by: INTERNAL MEDICINE

## 2018-04-04 PROCEDURE — 80074 ACUTE HEPATITIS PANEL: CPT | Performed by: HOSPITALIST

## 2018-04-04 PROCEDURE — 84484 ASSAY OF TROPONIN QUANT: CPT | Performed by: INTERNAL MEDICINE

## 2018-04-04 PROCEDURE — 71045 X-RAY EXAM CHEST 1 VIEW: CPT

## 2018-04-04 PROCEDURE — 82805 BLOOD GASES W/O2 SATURATION: CPT | Performed by: INTERNAL MEDICINE

## 2018-04-04 PROCEDURE — 94799 UNLISTED PULMONARY SVC/PX: CPT

## 2018-04-04 PROCEDURE — 87899 AGENT NOS ASSAY W/OPTIC: CPT | Performed by: PHYSICIAN ASSISTANT

## 2018-04-04 PROCEDURE — 86140 C-REACTIVE PROTEIN: CPT | Performed by: PHYSICIAN ASSISTANT

## 2018-04-04 PROCEDURE — 85025 COMPLETE CBC W/AUTO DIFF WBC: CPT | Performed by: INTERNAL MEDICINE

## 2018-04-04 PROCEDURE — 83605 ASSAY OF LACTIC ACID: CPT | Performed by: PHYSICIAN ASSISTANT

## 2018-04-04 PROCEDURE — 86738 MYCOPLASMA ANTIBODY: CPT | Performed by: PHYSICIAN ASSISTANT

## 2018-04-04 PROCEDURE — 83036 HEMOGLOBIN GLYCOSYLATED A1C: CPT | Performed by: HOSPITALIST

## 2018-04-04 PROCEDURE — 87040 BLOOD CULTURE FOR BACTERIA: CPT | Performed by: PHYSICIAN ASSISTANT

## 2018-04-04 PROCEDURE — 84132 ASSAY OF SERUM POTASSIUM: CPT | Performed by: INTERNAL MEDICINE

## 2018-04-04 PROCEDURE — 83050 HGB METHEMOGLOBIN QUAN: CPT | Performed by: INTERNAL MEDICINE

## 2018-04-04 PROCEDURE — 82962 GLUCOSE BLOOD TEST: CPT

## 2018-04-04 PROCEDURE — 85610 PROTHROMBIN TIME: CPT | Performed by: HOSPITALIST

## 2018-04-04 PROCEDURE — 85007 BL SMEAR W/DIFF WBC COUNT: CPT | Performed by: INTERNAL MEDICINE

## 2018-04-04 PROCEDURE — 83735 ASSAY OF MAGNESIUM: CPT | Performed by: HOSPITALIST

## 2018-04-04 PROCEDURE — 80048 BASIC METABOLIC PNL TOTAL CA: CPT | Performed by: INTERNAL MEDICINE

## 2018-04-04 PROCEDURE — 36600 WITHDRAWAL OF ARTERIAL BLOOD: CPT | Performed by: INTERNAL MEDICINE

## 2018-04-04 PROCEDURE — 71045 X-RAY EXAM CHEST 1 VIEW: CPT | Performed by: RADIOLOGY

## 2018-04-04 PROCEDURE — 99222 1ST HOSP IP/OBS MODERATE 55: CPT | Performed by: HOSPITALIST

## 2018-04-04 PROCEDURE — 82375 ASSAY CARBOXYHB QUANT: CPT | Performed by: INTERNAL MEDICINE

## 2018-04-04 PROCEDURE — 83880 ASSAY OF NATRIURETIC PEPTIDE: CPT | Performed by: INTERNAL MEDICINE

## 2018-04-04 PROCEDURE — 63710000001 DIPHENHYDRAMINE PER 50 MG: Performed by: HOSPITALIST

## 2018-04-04 PROCEDURE — 99231 SBSQ HOSP IP/OBS SF/LOW 25: CPT | Performed by: INTERNAL MEDICINE

## 2018-04-04 PROCEDURE — 85379 FIBRIN DEGRADATION QUANT: CPT | Performed by: PHYSICIAN ASSISTANT

## 2018-04-04 RX ORDER — POTASSIUM CHLORIDE 1.5 G/1.77G
40 POWDER, FOR SOLUTION ORAL AS NEEDED
Status: DISCONTINUED | OUTPATIENT
Start: 2018-04-04 | End: 2018-04-05 | Stop reason: HOSPADM

## 2018-04-04 RX ORDER — POTASSIUM CHLORIDE 7.45 MG/ML
10 INJECTION INTRAVENOUS
Status: DISCONTINUED | OUTPATIENT
Start: 2018-04-04 | End: 2018-04-05 | Stop reason: HOSPADM

## 2018-04-04 RX ORDER — LOSARTAN POTASSIUM 50 MG/1
50 TABLET ORAL DAILY
Status: DISCONTINUED | OUTPATIENT
Start: 2018-04-04 | End: 2018-04-05 | Stop reason: HOSPADM

## 2018-04-04 RX ORDER — DEXTROSE MONOHYDRATE 25 G/50ML
25 INJECTION, SOLUTION INTRAVENOUS
Status: DISCONTINUED | OUTPATIENT
Start: 2018-04-04 | End: 2018-04-05 | Stop reason: HOSPADM

## 2018-04-04 RX ORDER — BUMETANIDE 0.25 MG/ML
2 INJECTION INTRAMUSCULAR; INTRAVENOUS EVERY 12 HOURS
Status: DISCONTINUED | OUTPATIENT
Start: 2018-04-04 | End: 2018-04-05 | Stop reason: HOSPADM

## 2018-04-04 RX ORDER — METHYLPREDNISOLONE SODIUM SUCCINATE 125 MG/2ML
60 INJECTION, POWDER, LYOPHILIZED, FOR SOLUTION INTRAMUSCULAR; INTRAVENOUS EVERY 6 HOURS
Status: DISCONTINUED | OUTPATIENT
Start: 2018-04-04 | End: 2018-04-05 | Stop reason: HOSPADM

## 2018-04-04 RX ORDER — POTASSIUM CHLORIDE 20 MEQ/1
40 TABLET, EXTENDED RELEASE ORAL AS NEEDED
Status: DISCONTINUED | OUTPATIENT
Start: 2018-04-04 | End: 2018-04-05 | Stop reason: HOSPADM

## 2018-04-04 RX ORDER — MAGNESIUM SULFATE 1 G/100ML
1 INJECTION INTRAVENOUS AS NEEDED
Status: DISCONTINUED | OUTPATIENT
Start: 2018-04-04 | End: 2018-04-05 | Stop reason: HOSPADM

## 2018-04-04 RX ORDER — DIPHENHYDRAMINE HCL 25 MG
25 CAPSULE ORAL ONCE
Status: COMPLETED | OUTPATIENT
Start: 2018-04-04 | End: 2018-04-04

## 2018-04-04 RX ORDER — NICOTINE POLACRILEX 4 MG
15 LOZENGE BUCCAL
Status: DISCONTINUED | OUTPATIENT
Start: 2018-04-04 | End: 2018-04-05 | Stop reason: HOSPADM

## 2018-04-04 RX ORDER — MAGNESIUM SULFATE HEPTAHYDRATE 40 MG/ML
2 INJECTION, SOLUTION INTRAVENOUS AS NEEDED
Status: DISCONTINUED | OUTPATIENT
Start: 2018-04-04 | End: 2018-04-05 | Stop reason: HOSPADM

## 2018-04-04 RX ORDER — MAGNESIUM SULFATE HEPTAHYDRATE 40 MG/ML
4 INJECTION, SOLUTION INTRAVENOUS AS NEEDED
Status: DISCONTINUED | OUTPATIENT
Start: 2018-04-04 | End: 2018-04-05 | Stop reason: HOSPADM

## 2018-04-04 RX ORDER — HYDROCHLOROTHIAZIDE 12.5 MG/1
12.5 CAPSULE, GELATIN COATED ORAL DAILY
Status: DISCONTINUED | OUTPATIENT
Start: 2018-04-04 | End: 2018-04-04

## 2018-04-04 RX ORDER — POTASSIUM CHLORIDE 20 MEQ/1
40 TABLET, EXTENDED RELEASE ORAL EVERY 4 HOURS
Status: COMPLETED | OUTPATIENT
Start: 2018-04-04 | End: 2018-04-04

## 2018-04-04 RX ORDER — BUMETANIDE 1 MG/1
1 TABLET ORAL DAILY
Status: DISCONTINUED | OUTPATIENT
Start: 2018-04-04 | End: 2018-04-05 | Stop reason: HOSPADM

## 2018-04-04 RX ADMIN — METHYLPREDNISOLONE SODIUM SUCCINATE 60 MG: 125 INJECTION, POWDER, FOR SOLUTION INTRAMUSCULAR; INTRAVENOUS at 19:51

## 2018-04-04 RX ADMIN — BUMETANIDE 1 MG: 1 TABLET ORAL at 13:56

## 2018-04-04 RX ADMIN — LOSARTAN POTASSIUM 50 MG: 50 TABLET, FILM COATED ORAL at 11:12

## 2018-04-04 RX ADMIN — POTASSIUM CHLORIDE 40 MEQ: 1500 TABLET, EXTENDED RELEASE ORAL at 13:55

## 2018-04-04 RX ADMIN — IPRATROPIUM BROMIDE AND ALBUTEROL SULFATE 3 ML: .5; 3 SOLUTION RESPIRATORY (INHALATION) at 06:17

## 2018-04-04 RX ADMIN — POTASSIUM CHLORIDE 40 MEQ: 1500 TABLET, EXTENDED RELEASE ORAL at 17:23

## 2018-04-04 RX ADMIN — SPIRONOLACTONE 25 MG: 25 TABLET, FILM COATED ORAL at 08:53

## 2018-04-04 RX ADMIN — AMLODIPINE BESYLATE 10 MG: 10 TABLET ORAL at 08:53

## 2018-04-04 RX ADMIN — IPRATROPIUM BROMIDE AND ALBUTEROL SULFATE 3 ML: .5; 3 SOLUTION RESPIRATORY (INHALATION) at 19:04

## 2018-04-04 RX ADMIN — DOXYCYCLINE 100 MG: 100 INJECTION, POWDER, LYOPHILIZED, FOR SOLUTION INTRAVENOUS at 21:53

## 2018-04-04 RX ADMIN — BUMETANIDE 2 MG: 0.25 INJECTION INTRAMUSCULAR; INTRAVENOUS at 21:54

## 2018-04-04 RX ADMIN — DIPHENHYDRAMINE HYDROCHLORIDE 25 MG: 25 CAPSULE ORAL at 21:54

## 2018-04-04 RX ADMIN — IPRATROPIUM BROMIDE AND ALBUTEROL SULFATE 3 ML: .5; 3 SOLUTION RESPIRATORY (INHALATION) at 13:25

## 2018-04-04 RX ADMIN — HYDROCHLOROTHIAZIDE 12.5 MG: 12.5 CAPSULE, GELATIN COATED ORAL at 11:12

## 2018-04-04 RX ADMIN — BUMETANIDE 2 MG: 0.25 INJECTION, SOLUTION INTRAMUSCULAR; INTRAVENOUS at 04:55

## 2018-04-04 RX ADMIN — AZTREONAM 2 G: 2 INJECTION, POWDER, FOR SOLUTION INTRAMUSCULAR; INTRAVENOUS at 19:52

## 2018-04-04 NOTE — PROGRESS NOTES
LOS: 2 days     Name: Cristine Berkowitz  Age/Sex: 52 y.o. female  :  1965        PCP: ALONDRA Lott  REF: Mike Garcia MD    Active Problems:    Acute decompensated heart failure      Reason for follow-up: Acute diastolic heart failure.    Subjective    is a pleasant 52-year-old  female presenting with complaints of having progressive worsening dyspnea for the last 6 months and progressive weight gain and leg swelling.  On recent chest x-ray performed at her PCPs office she was apparently noted to have cardiomegaly and possible congestive heart failure.  She is here for further cardiac evaluation and management.  She also has underlying significant COPD and has been on home oxygen.  She she has some intermittent chest pressure when she gets short of breath.  She has a long history of smoking up to 3 packs a day but currently has cut down to 8 cigarettes a day.  She is nondiabetic.  She has not had any previous history of known coronary artery disease.  No previous history of congestive heart failure.  She has gained about 28 pounds since 2017.  She is admitted with a diagnosis of possible acute decompensated heart failure.      Interval History: She stated that her breathing continues to get better.  She denies any chest pains.    ROS    Vital Signs  Temp:  [97.6 °F (36.4 °C)-98.4 °F (36.9 °C)] 97.6 °F (36.4 °C)  Heart Rate:  [] 90  Resp:  [16-24] 20  BP: (117-157)/(55-96) 157/96  Vital Signs (last 72 hrs)       04/ 0700  -  04/ 0659 04/ 0700  -  / 0659 04/03 0700  -   0659 / 0700  -   0926   Most Recent    Temp (°F)   98.2 -  98.3    97.6 -  98.4       97.6 (36.4)    Heart Rate   91 -  100    89 -  102       90    Resp   18 -  20    16 -  24       20    BP   156/88 -  (!) 201/99    117/55 -  157/96       157/96  Comment: nurse pamela aware    SpO2 (%)   (!)73 -  92    (!)80 -  99       97        Body mass index is 30.09  kg/m².    Intake/Output Summary (Last 24 hours) at 04/04/18 0926  Last data filed at 04/04/18 0852   Gross per 24 hour   Intake             1320 ml   Output             1100 ml   Net              220 ml     Objective        Physical Exam:     General Appearance:    Alert, cooperative, in no acute distress   Head:    Normocephalic, without obvious abnormality, atraumatic   Eyes:            Conjunctivae and sclerae normal, no   icterus, no pallor, corneas clear.   Neck:   No adenopathy, supple, trachea midline, no thyromegaly, no   carotid bruit, no JVD   Lungs:     Clearer to auscultation,respirations regular, even and                  unlabored    Heart:    Regular rhythm and normal rate, normal S1 and S2, no            murmur, no gallop, no rub, no click   Chest Wall:    No abnormalities observed   Abdomen:     Normal bowel sounds, no masses, no organomegaly, soft        non-tender, non-distended, no guarding, no rebound                tenderness   Extremities:   Moves all extremities well, no edema, no cyanosis, no             redness   Pulses:   Pulses palpable and equal bilaterally   Skin:   No bleeding, bruising or rash              Procedures    Results Review:     Results from last 7 days  Lab Units 04/04/18  0026 04/03/18  0037 04/02/18  1336   WBC 10*3/mm3 5.87 5.24 5.31   HEMOGLOBIN g/dL 17.9* 17.8* 17.5*   PLATELETS 10*3/mm3 97* 89* 92*       Results from last 7 days  Lab Units 04/03/18  0037 04/02/18  1336   SODIUM mmol/L 143 147   POTASSIUM mmol/L 4.1 3.5   CHLORIDE mmol/L 101 102   CO2 mmol/L 39.5* 37.1*   BUN mg/dL 7 8   CREATININE mg/dL 0.65 0.64   CALCIUM mg/dL 9.5 9.2   GLUCOSE mg/dL 84 84   ALT (SGPT) U/L 19 16   AST (SGOT) U/L 27 22       Results from last 7 days  Lab Units 04/04/18  0511 04/04/18  0026 04/03/18  1733 04/03/18  1205 04/03/18  0458   TROPONIN I ng/mL 0.029 0.036 0.029 0.039 0.040     Lab Results   Component Value Date    INR 0.96 10/23/2017    INR 0.95 10/06/2017     No results  found for: MG  Lab Results   Component Value Date    TSH 1.060 11/16/2015    CHLPL 157 11/16/2015    TRIG 72 04/03/2018    HDL 35 (L) 04/03/2018    LDL 60 04/03/2018      Lab Results   Component Value Date    .0 (H) 04/02/2018    .0 (H) 10/06/2017    .0 (H) 12/23/2016         Echo   Interpretation Summary     · Normal left ventricular cavity size and wall thickness noted. All left ventricular wall segments contract normally.  · Estimated EF appears to be in the range of 61 - 65%.  · The aortic valve is structurally normal. No aortic valve regurgitation is present. No aortic valve stenosis is present.  · The mitral valve is normal in structure. No mitral valve regurgitation is present. No significant mitral valve stenosis is present.  · The tricuspid valve is normal. No tricuspid valve stenosis is present. Mild tricuspid valve regurgitation is present. Estimated right ventricular systolic pressure from tricuspid regurgitation is moderately elevated (45-55 mmHg).  · Moderate pulmonary hypertension is present.  · There is no evidence of pericardial effusion.          I reviewed the patient's new clinical results.    Telemetry:Sinus rhythm and sinus tachycardia 90s to 100s.       Medication Review:     amLODIPine 10 mg Oral Q24H   bumetanide 2 mg Intravenous Q12H   enoxaparin 40 mg Subcutaneous Daily   ipratropium-albuterol 3 mL Nebulization 4x Daily - RT   spironolactone 25 mg Oral Daily            Assessment:  1. Acute diastolic heart failure, seems to be doing better.  2. Uncontrolled hypertension.  3. COPD.  4. Tobacco abuse.    Recommendations:  1. Change her to by mouth Bumex.  2. Continue with spironolactone and change her amlodipine to losartan HCTZ.  3. Evaluate with Lexiscan sestamibi study in a.m to rule out any occult myocardial ischemia.      I discussed the patients findings and my recommendations with patient and family        SANDRA Brand  04/04/18  9:26 AM    Dragon  disclaimer:  Much of this encounter note is an electronic transcription/translation of spoken language to printed text. The electronic translation of spoken language may permit erroneous, or at times, nonsensical words or phrases to be inadvertently transcribed; Although I have reviewed the note for such errors, some may still exist.

## 2018-04-04 NOTE — PLAN OF CARE
Problem: Patient Care Overview  Goal: Plan of Care Review  Outcome: Ongoing (interventions implemented as appropriate)   04/04/18 4758   Coping/Psychosocial   Plan of Care Reviewed With patient       Problem: Chronic Obstructive Pulmonary Disease (Adult)  Goal: Signs and Symptoms of Listed Potential Problems Will be Absent, Minimized or Managed (Chronic Obstructive Pulmonary Disease)  Outcome: Ongoing (interventions implemented as appropriate)

## 2018-04-04 NOTE — PLAN OF CARE
Problem: Patient Care Overview  Goal: Plan of Care Review  Outcome: Ongoing (interventions implemented as appropriate)    Goal: Individualization and Mutuality  Outcome: Ongoing (interventions implemented as appropriate)      Problem: Fall Risk (Adult)  Goal: Identify Related Risk Factors and Signs and Symptoms  Outcome: Ongoing (interventions implemented as appropriate)    Goal: Absence of Fall  Outcome: Ongoing (interventions implemented as appropriate)      Problem: Chronic Obstructive Pulmonary Disease (Adult)  Goal: Signs and Symptoms of Listed Potential Problems Will be Absent, Minimized or Managed (Chronic Obstructive Pulmonary Disease)  Outcome: Ongoing (interventions implemented as appropriate)      Problem: Cardiac: Heart Failure (Adult)  Goal: Signs and Symptoms of Listed Potential Problems Will be Absent, Minimized or Managed (Cardiac: Heart Failure)  Outcome: Ongoing (interventions implemented as appropriate)      Problem: Hypertensive Disease/Crisis (Arterial) (Adult)  Goal: Signs and Symptoms of Listed Potential Problems Will be Absent, Minimized or Managed (Hypertensive Disease/Crisis)  Outcome: Ongoing (interventions implemented as appropriate)

## 2018-04-04 NOTE — CONSULTS
Inpatient Hospitalist Consult  Consult performed by: PALOMO OSBORNE  Consult ordered by: HALLIE BARRETT          Patient Identification:  Name:  Cristine Berkowitz  Age:  52 y.o.  Sex:  female  :  1965  MRN:  [id number]  Visit Number:  [id number]  Primary care provider:  ALONDRA Lott    History of presenting illness:      Cristine Berkowitz is a 52-year-old  female admitted to this facility on 17 from the office of Dr. Barrett for progressive weight gain and leg swelling over a 6 month period with CXR at PCPs office reportedly concerning for cardiomegaly and CHF.  She has significant COPD and uses 2 liters of oxygen supplementally. She has history of smoking up to 3 packs of cigarettes per day in the past and follows with pulmonlogist on this date.   On this date, she was on the medical-surgical floor with telemetry and reportedly have difficulty keeping oxygen saturation elevated. Respiratory therapy reports when ambulating to the bathroom she dropped in the 60s. They report she continued to have difficulty keeping oxygen saturations elevated on nasal cannula, and she was eventually on high-flow oxygen via nasal cannula.  However, initial ABG revealed ph of 7.449 with pCO2 of 76.5, pO2 of 51.9, in addition to bicarbonate level of 20.7 on high flow oxygen with FiO2 of 60%.  BiPAP and transfer to CCU were ordered by attending (Dr. Barrett with Cardiology) in addition to hospitalist consult.  The patient herself reports that she did feel that she had worsening shortness of breath until this date. However, she is somewhat anxious in response to discussion. Review of daily weights reveals nearly 20 lbs diuresed in the past 48 hours with I&O evident of negative fluid balance at present. Mrs. Berkowitz reports that she initially had swelling in her abdomen and breasts and it spread distally to her legs. She reports this is improving, especially in her trunk, but she has some bilateral leg  redness. She denies chest pain. She reports cough and wheezing. She has been moved to CCU and started on BiPAP. She denies fever and chills.     Prior pCO2 values within the system upon review have also been in the 70s-80s.                                                                                                                                                                                                                                                                        Intake/Output Summary (Last 24 hours) at 04/04/18 1844  Last data filed at 04/04/18 1837   Gross per 24 hour   Intake             1680 ml   Output             3150 ml   Net            -1470 ml           ---------------------------------------------------------------------------------------------------------------------  Review of Systems   Constitutional: Negative for chills and fatigue.   HENT: Negative for congestion and ear discharge.    Eyes: Negative for discharge and itching.   Respiratory: Positive for cough, shortness of breath and wheezing. Negative for choking and stridor.    Cardiovascular: Positive for leg swelling. Negative for chest pain.   Gastrointestinal: Positive for abdominal distention. Negative for diarrhea, nausea and vomiting.   Endocrine: Negative for cold intolerance and heat intolerance.   Genitourinary: Negative for difficulty urinating and dysuria.   Musculoskeletal: Negative for arthralgias and gait problem.   Skin: Negative for color change and rash.   Allergic/Immunologic: Negative for environmental allergies and food allergies.   Neurological: Negative for dizziness and headaches.   Psychiatric/Behavioral: Negative for agitation and confusion.      ---------------------------------------------------------------------------------------------------------------------   Past History:  Family History   Problem Relation Age of Onset   • Breast cancer Paternal Aunt    • Heart disease Mother    • Heart disease  Father    • Heart disease Sister    • Heart disease Brother      Past Medical History:   Diagnosis Date   • Arthritis    • Asthma    • CHF (congestive heart failure)    • COPD (chronic obstructive pulmonary disease)    • Coronary artery disease    • Diabetes mellitus    • Hypertension      Past Surgical History:   Procedure Laterality Date   • HYSTERECTOMY       Social History     Social History   • Marital status:      Social History Main Topics   • Smoking status: Current Every Day Smoker     Packs/day: 0.25     Years: 15.00     Types: Cigarettes   • Alcohol use No   • Drug use: Unknown   • Sexual activity: Defer     Other Topics Concern   • Not on file     ---------------------------------------------------------------------------------------------------------------------   Allergies:  Amoxicillin; Codeine; Latex; and Rocephin [ceftriaxone]  ---------------------------------------------------------------------------------------------------------------------   Prior to Admission Medications     Prescriptions Last Dose Informant Patient Reported? Taking?    albuterol (PROVENTIL HFA;VENTOLIN HFA) 108 (90 Base) MCG/ACT inhaler Unknown Self Yes No    Inhale 2 puffs Every 4 (Four) Hours As Needed for Wheezing.    albuterol (PROVENTIL) (2.5 MG/3ML) 0.083% nebulizer solution Unknown Self Yes No    Take 2.5 mg by nebulization Every 4 (Four) Hours As Needed for Wheezing.        Hospital Meds:    aztreonam 2 g Intravenous Once   [START ON 4/5/2018] aztreonam 2 g Intravenous Q8H   bumetanide 1 mg Oral Daily   doxycycline 100 mg Intravenous Q12H   enoxaparin 40 mg Subcutaneous Daily   Hydrochlorothiazide Oral 12.5 mg Oral Daily   ipratropium-albuterol 3 mL Nebulization 4x Daily - RT   losartan 50 mg Oral Daily   methylPREDNISolone sodium succinate 60 mg Intravenous Q6H   spironolactone 25 mg Oral Daily         ---------------------------------------------------------------------------------------------------------------------   Vital Signs:  Temp:  [97.6 °F (36.4 °C)-98.5 °F (36.9 °C)] 98.5 °F (36.9 °C)  Heart Rate:  [] 90  Resp:  [18-22] 18  BP: (120-157)/(63-96) 135/73  1    04/02/18  1405 04/03/18  0612 04/04/18  0852   Weight: 90 kg (198 lb 7 oz) 82.8 kg (182 lb 9.6 oz) 77 kg (169 lb 12.8 oz)     Body mass index is 30.09 kg/m².  ---------------------------------------------------------------------------------------------------------------------   Physical exam:  Constitutional:  Chronically ill-appearing  HENT:  Head: Normocephalic and atraumatic.  Mouth:  Moist mucous membranes. BiPAP mask in place.    Eyes:  Conjunctivae and EOM are normal.  Pupils are equal, round, and reactive to light.  No scleral icterus.    Neck:  Neck supple.  No JVD present.    Cardiovascular:  Normal rate, regular rhythm. Normal s1/s2  No murmur.  Pulmonary/Chest:  Diminished breath sounds bilaterally.  Mild wheezing appreciated bilaterally. No rales appreciated at this time.  Abdominal:  Soft.  Bowel sounds are present.  No distension and no tenderness.   Musculoskeletal:  No tenderness, and no deformity.  No red or swollen joints anywhere.    Neurological:  Alert and oriented to person, place, and time.  No cranial nerve deficit.  No tongue deviation.  No facial droop.  No slurred speech.   Skin:  Skin is warm and dry. No rash noted.  No pallor.  Mild bilateral lower extremity redness about lower legs possibly secondary to venous stasis from edema. Mild lower extremity edema persists but per patient is much improved since admission.   Psychiatric:  Normal mood and affect.  Behavior is normal.  Judgment and thought content normal.   ---------------------------------------------------------------------------------------------------------------------   EKG: Sinus rhythm, HR 93. QTc 408. Possible right ventricular hypertrophy.  Nonspecific T wave abnormality. No overt ST changes to suggest acute ischemia.      ---------------------------------------------------------------------------------------------------------------------     Results from last 7 days  Lab Units 04/04/18  0026 04/03/18  0037 04/02/18  1336   WBC 10*3/mm3 5.87 5.24 5.31   HEMOGLOBIN g/dL 17.9* 17.8* 17.5*   HEMATOCRIT % 58.0* 56.6* 57.2*   MCV fL 98.1* 99.0* 98.6*   MCHC g/dL 30.9* 31.4* 30.6*   PLATELETS 10*3/mm3 97* 89* 92*       Results from last 7 days  Lab Units 04/04/18  1647   PH, ARTERIAL pH units 7.449   PO2 ART mm Hg 53.6*   PCO2, ARTERIAL mm Hg 76.5*   HCO3 ART mmol/L 51.9*       Results from last 7 days  Lab Units 04/04/18  1002 04/03/18  0037 04/02/18  1336   SODIUM mmol/L 141 143 147   POTASSIUM mmol/L 3.2* 4.1 3.5   CHLORIDE mmol/L 88* 101 102   CO2 mmol/L >40.0* 39.5* 37.1*   BUN mg/dL 10 7 8   CREATININE mg/dL 0.68 0.65 0.64   EGFR IF NONAFRICN AM mL/min/1.73 92 96 98   CALCIUM mg/dL 10.0 9.5 9.2   GLUCOSE mg/dL 98 84 84   ALBUMIN g/dL  --  3.40* 3.40*   BILIRUBIN mg/dL  --  1.3 1.0   ALK PHOS U/L  --  89 91   AST (SGOT) U/L  --  27 22   ALT (SGPT) U/L  --  19 16   Estimated Creatinine Clearance: 97.2 mL/min (by C-G formula based on SCr of 0.68 mg/dL).  No results found for: AMMONIA    Results from last 7 days  Lab Units 04/04/18  1137 04/04/18  0511 04/04/18  0026   TROPONIN I ng/mL 0.029 0.029 0.036         No results found for: HGBA1C  Lab Results   Component Value Date    TSH 1.060 11/16/2015    FREET4 1.20 11/16/2015     No results found for: PREGTESTUR, PREGSERUM, HCG, HCGQUANT  Pain Management Panel     There is no flowsheet data to display.                      Results from last 7 days  Lab Units 04/03/18  0037   CHOLESTEROL mg/dL 109   TRIGLYCERIDES mg/dL 72   HDL CHOL mg/dL 35*   LDL CHOL mg/dL 60     ---------------------------------------------------------------------------------------------------------------------   Imaging Results (last 7  days)     Procedure Component Value Units Date/Time    XR Chest 1 View [[id number]] Updated:  04/04/18 1818    XR Chest AP [[id number]] Collected:  04/03/18 0556     Updated:  04/03/18 0558    Narrative:       EXAMINATION: XR CHEST AP-      CLINICAL INDICATION:     Rule out CHF; I50.9-Heart failure, unspecified     TECHNIQUE:  XR CHEST AP-      COMPARISON: 10/23/2017      FINDINGS:   Lungs are aerated.   Moderate cardiomegaly. Central pulmonary vascular congestion.  Interstitial edema.   No pneumothorax.   No pleural effusion.   No acute osseous findings.            Impression:       CHF/edema.     This report was finalized on 4/3/2018 5:56 AM by Dr. Sebastian Gamble MD.             Results for orders placed during the hospital encounter of 04/02/18   Adult Transthoracic Echo Complete W/ Cont if Necessary Per Protocol    Narrative · Normal left ventricular cavity size and wall thickness noted. All left   ventricular wall segments contract normally.  · Estimated EF appears to be in the range of 61 - 65%.  · The aortic valve is structurally normal. No aortic valve regurgitation   is present. No aortic valve stenosis is present.  · The mitral valve is normal in structure. No mitral valve regurgitation   is present. No significant mitral valve stenosis is present.  · The tricuspid valve is normal. No tricuspid valve stenosis is present.   Mild tricuspid valve regurgitation is present. Estimated right ventricular   systolic pressure from tricuspid regurgitation is moderately elevated   (45-55 mmHg).  · Moderate pulmonary hypertension is present.  · There is no evidence of pericardial effusion.            I have personally reviewed the radiology images and read the final radiology report.  ---------------------------------------------------------------------------------------------------------------------     Assessment and Plan:    -Acute on chronic hypoxic respiratory failure likely secondary to acute exacerbation of COPD,  decompensated CHF, and moderate pulmonary hypertension:    BiPAP has been ordered per primary with repeat ABG per discussion with respiratory therapy.  IV methylprednisolone has been added per primary. IV azactam and doxycycline have been ordered for coverage of underlying COPD exacerbation.  Inhalants treatments have been continued.  WBC unremarkable this AM. D-dimer added to investigate for possible PE.  Lactic acid, blood cultures, and C-RP added in the event she has developed sepsis as an outpatient.  STAT CXR has been ordered and is pending at present.      -Hypokalemia:  Potassium replacement ordered per primary. Will check magnesium and supplement per protocol if indicated.    -Acute diastolic heart failure:  Management per primary (Cardiology). Receiving bumex at this time. Continue to monitor strict I/Os, daily weights, renal function.     -Hypertension, controlled on this date: Continue Losartan and spironolactone per primary. BP marginal at present which could be related to increased intrathoracic pressure from bipap. Will place hold parameters on above medications.    -Thrombocytopenia, appears to be chronic but more pronounced now. Still, holding steady at 97 K today even with prophylactic lovenox on board. Albumin appears to be on low side. Check INR. Also check hepatiits panel to look for underlying cause of chronic thrombocytopenia.    -Type II DM, non insulin dependent: check A1c to assess long-term control. Not requiring insulin here thus far as BG have been in 80-90 range. Will order accuchecks since receiving IV steroids now.     Thank you for the opportunity to participate in the care of your patient. Please do not hesitate to call with any questions or concerns.     Zenaida Amaya PA-C  04/04/18  6:44 PM  ---------------------------------------------------------------------------------------------------------------------     * I have seen the patient in conjunction with Zenaida Amaya,  PATIENCE, and have amended her note to reflect my own findings, assessment and plan.

## 2018-04-05 VITALS
DIASTOLIC BLOOD PRESSURE: 89 MMHG | BODY MASS INDEX: 30.09 KG/M2 | OXYGEN SATURATION: 90 % | SYSTOLIC BLOOD PRESSURE: 155 MMHG | HEART RATE: 98 BPM | RESPIRATION RATE: 25 BRPM | WEIGHT: 169.8 LBS | HEIGHT: 63 IN | TEMPERATURE: 98.4 F

## 2018-04-05 LAB
HAV IGM SERPL QL IA: NORMAL
HBV CORE IGM SERPL QL IA: NORMAL
HBV SURFACE AG SERPL QL IA: NORMAL
HCV AB SER DONR QL: NORMAL

## 2018-04-05 PROCEDURE — 99238 HOSP IP/OBS DSCHRG MGMT 30/<: CPT | Performed by: INTERNAL MEDICINE

## 2018-04-05 PROCEDURE — 25010000002 ENOXAPARIN PER 10 MG: Performed by: INTERNAL MEDICINE

## 2018-04-05 RX ADMIN — ENOXAPARIN SODIUM 80 MG: 80 INJECTION SUBCUTANEOUS at 00:06

## 2018-04-05 NOTE — PAYOR COMM NOTE
"[facility]  NPI:[id number]    Utilization Review  Contact: Talita Haddad RN  Phone: [phone]  Fax:[phone]    DISCHARGE NOTIFICATION    PATIENT LEFT AMA ON 04/05/2018  AUTH # [id number]    Andrea Gilmore (52 y.o. Female)     Date of Birth Social Security Number Address Home Phone MRN    1965  [address] [place] [address] [phone] [id number]    Yarsani Marital Status          [facility]        Admission Date Admission Type Admitting Provider Attending Provider Department, Room/Bed    4/2/18 Elective Mike Garcia MD  [facility] CRITICAL CARE, CC10/    Discharge Date Discharge Disposition Discharge Destination        4/5/2018 Left Against Medical Advice              Attending Provider:  (none)   Allergies:  Amoxicillin, Codeine, Latex, Rocephin [Ceftriaxone]    Isolation:  None   Infection:  None   Code Status:  Prior    Ht:  160 cm (62.99\")   Wt:  77 kg (169 lb 12.8 oz)    Admission Cmt:  None   Principal Problem:  None                Active Insurance as of 4/2/2018     Primary Coverage     Payor Plan Insurance Group Employer/Plan Group    [employer] [place] [employer] [place]ICA[employer]      Payor Plan Address Payor Plan Phone Number Effective From Effective To    [address] [address] [phone] 11/11/2016     [place] [address]       Subscriber Name Subscriber Birth Date Member ID       ANDREA GILMORE 1965 [id number]                 Emergency Contacts      (Rel.) Home Phone Work Phone Mobile Phone    SandipJhon (Other) [phone] -- --          Daniella Rossi RN Registered Nurse Signed   Significant Event Date of Service: 4/5/2018  1:16 AM         ManualTemplate   Copied  Patient requesting to leave AMA. Patient states 'she was told by her lung doctor that she only had 6 months to leave and didn't want to spend it here.' Patient is alert and oriented x 4. Spoke with patients daughter, aunt and other family " members stating that patient was a risk for deterioration if leaving the hospital tonight. Patient also understood that she could deteriorate to the point of death if she left. Patient states she still would like to leave and that her ride is on her way. Dr. Garcia made aware and states that patient is able to make her own decisions and can leave if she wishes. Dr. Zuniga aware of patients wishes as well. HS aware of patient leaving AMA. Patient left with home oxygen and ambulatory with family with her. Mayte Blair, Primary RN aware and at bedside during this event along with lead RN, Daniella

## 2018-04-05 NOTE — PROGRESS NOTES
Brief Hospitalist Update:    Infomed by RN that d-dimer came back positive. Ordered CT PE protocol and venous doppler of lower ext. Needs appropriate IV access for contrast which RN is working on. Also RN was having trouble with patient's O2 sat dropping whenever bipap removed. Will see if she can maintain O2 sat on NRB in order to go down for scan. RN tells me radiology is very backed up at the moment so even if all of the above are reconciled, will be a while before patient can go down for scan. So will go ahead and start lovenox. Aware of low platelets which have been stable since admission on prophylactic lovenox so will just continue to monitor closely.

## 2018-04-05 NOTE — SIGNIFICANT NOTE
Patient requesting to leave AMA. Patient states 'she was told by her lung doctor that she only had 6 months to leave and didn't want to spend it here.' Patient is alert and oriented x 4. Spoke with patients daughter, aunt and other family members stating that patient was a risk for deterioration if leaving the hospital tonight. Patient also understood that she could deteriorate to the point of death if she left. Patient states she still would like to leave and that her ride is on her way. Dr. Garcia made aware and states that patient is able to make her own decisions and can leave if she wishes. Dr. Zuniga aware of patients wishes as well. HS aware of patient leaving AMA. Patient left with home oxygen and ambulatory with family with her. Mayte Blair, Primary RN aware and at bedside during this event along with lead RN, Daniella Rossi.

## 2018-04-05 NOTE — PROGRESS NOTES
Pharmacy was consulted to dose enoxaparin for a DVT/PE (active thrombus). Based on an estimated CrCl of 97mL/min, and actual body weight of 77kg a dose of 80mg q12hr has been ordered. Thank you for the consult.     Thank you,   Caesar Winston [facility]  10:21 PM

## 2018-04-05 NOTE — NURSING NOTE
Patient 18g that was successfully placed now unable to flush or establish blood return, patient 22 g also leaking at this time. Dr. Zuniga notified of patient inability to sustain IV access, stated to consult patient midline nurse in AM and postpone CT scan until tomorrow.

## 2018-04-05 NOTE — PROGRESS NOTES
Discharge Planning Assessment   Steven     Patient Name: Cristine Berkowitz  MRN: [id number]  Today's Date: 4/5/2018    Admit Date: 4/2/2018      Discharge Plan     Row Name 04/05/18 0746       Plan    Patient/Family in Agreement with Plan yes    Final Discharge Disposition Code 07 - left AMA    Final Note Pt signed out AMA. No other needs identified.      Lala Martinez

## 2018-04-05 NOTE — PROGRESS NOTES
THC Physician - Brief Progress Note  PERMANENT  04/04/2018 23:14    [facility]  [facility] - CCU - 10 - [place], [place] ([facility])    GILMORE ANDREALIEN MADRIGAL    Date of Service 04/04/2018 23:14    HPI/Events of Note AICU Provider Assessment Note    53 y/o actively smoking female with O2 dependent COPD and chronic hypoxic and hypercarbic respiratory failure admitted with worsening anasarca and hypoxia req HHFNC and now BIPAP despite aggressive diuresis since admission (ABG 7.44/53/76). Placed on   empiric therapeutic lovenox until angioCT can be performed (no IV access at present) with ECHO likely underestimating her PASP. On empiric nebs, steroids and doxycycline to cover LE cellulitis and COPD exacerbation. Chronic thrombocytopenia noted and   suggest liver US to assess for occult portal HTN from congestive hepatopathy. Polycythemia suggests chronic hypoventilation despite O2 at home and BIPAP with DC recommended together with smoking cessation    _x___   Video Assessment performed  __x___   Most recent labs reviewed  __x___   Vital Signs reviewed  __x___   Best Practices addressed:                 VTE prophylaxis: SCD/lovenox as above                 SUP (when indicated): NA                 Glycemic control: SSI                      Please notify bedside physician when present or AICU Care if glucose > 180 X 2                 Sepsis guidelines: yes                 Lung protective strategy: NA    ____      Spoke with bedside RN  ____       Orders written      Contact [facility] for any needs if bedside physician is not present.      Interventions Major-Respiratory failure - evaluation and management        Electronically Signed by: Anjum Capps) on 04/04/2018 23:17

## 2018-04-05 NOTE — NURSING NOTE
STAT CT Chest ordered per Dr. Zuniga, patient has 1 22 g at this time. Will attempt IV access for diagnostic test. Patient also BiPap dependent due to decreased oxygen saturations without at this time. Will trial patient on nonrebreather and reassess patients ability to go downstairs for CT scan.

## 2018-04-06 ENCOUNTER — DOCUMENTATION (OUTPATIENT)
Dept: CARDIOLOGY | Facility: CLINIC | Age: 53
End: 2018-04-06

## 2018-04-06 NOTE — PROGRESS NOTES
I tried to call Ms. Berkowitz at [phone] but got  male.  I left a message on her voicemail for her to come back to the emergency room to be readmitted since she left the hospital prematurely.  I noticed her blood culture Gram stain was positive for gram-positive basilar but there was no growth noted 24 hours.

## 2018-04-06 NOTE — PROGRESS NOTES
Notified by laboratory concerning 1 of 2 blood cultures + gram positive bacilli. Discussed with Dr. Padilla and both of us agree that no treatment is needed as this is a probable contaminant.  Thank You,  Zaira NathD

## 2018-04-07 LAB
BACTERIA SPEC AEROBE CULT: ABNORMAL
BACTERIA SPEC AEROBE CULT: ABNORMAL
GRAM STN SPEC: ABNORMAL

## 2018-04-09 LAB — BACTERIA SPEC AEROBE CULT: NORMAL

## 2018-04-15 NOTE — DISCHARGE SUMMARY
Patient Identification:  Name:  Cristine Berkowitz  Age:  52 y.o.  Sex:  female  :  1965  MRN:  [id number]  Visit Number:  [id number]    Date of Admission: 2018  Date of Discharge:  4/15/2018    PCP: Afia Lopez, APRN    DISCHARGE DIAGNOSIS  1. Acute diastolic heart failure, improved  2. Acute on chronic hypoxic/hypercapnic respiratory failure due to possibly combination of COPD, diastolic heart failure with right heart failure.  Patient has been on home oxygen.  3. Tobacco abuse with smoking up to 3 packs a day.  4. Uncontrolled hypertension    CONSULTS   Hospitalist consultation    PROCEDURES PERFORMED  Echo Doppler study    HOSPITAL COURSE  Ms. Berkowitz is a pleasant 52-year-old  female presenting with complaints of having progressive worsening dyspnea for the last 6 months and progressive weight gain and leg swelling.  On recent chest x-ray performed at her PCPs office she was apparently noted to have cardiomegaly and possible congestive heart failure.  She is here for further cardiac evaluation and management.  She also has underlying significant COPD and has been on home oxygen.  She she has some intermittent chest pressure when she gets short of breath.  She has a long history of smoking up to 3 packs a day but currently has cut down to 8 cigarettes a day.  She is nondiabetic.  She has not had any previous history of known coronary artery disease.  No previous history of congestive heart failure.  She has gained about 28 pounds since 2017.  She is admitted with a diagnosis of possible acute decompensated heart failure.    Patient was admitted to the medical floor and was treated with IV diuretics with good diuretic response and improvement in her symptoms.  Her leg edema also has improved significantly.  Her echo Doppler study revealed normal left ventricular chamber size, wall motion and systolic function with estimated ejection fraction about 60-65%, with evidence of  "moderate pulmonary hypertension.  Patient has been anxious to go home ever since she got admitted to the hospital and in fact was reluctant even to be admitted in the first place.  On 4/4/2008, she was still having some dyspnea and with her blood gases revealing continued hypoxia and hypercapnia, she was moved to CCU and hospitalist services were consulted.  She was placed on BiPAP.  Arrangements were being made to do a CT scan of the chest to rule out pulmonary embolus.  However patient continued to get anxious and wanting to go home.  She was explained about the risks of leaving the hospital prematurely with the risk of potentially dying with respiratory failure.  Patient expressed understanding and was adamant about going home and she finally signed out AMA despite being explained about this several times by the nurses.  I tried to contact the patient at home but there was no response and got voicemail.  I have requested her to come back to the hospital emergency room for readmission and continuation of her management for her respiratory failure and lung problems and heart problems etc.  So asked her to call me through the hospital  if she has any questions.    CONDITION ON DISCHARGE  Not stable.    VITAL SIGNS  /89   Pulse 98   Temp 98.4 °F (36.9 °C) (Oral)   Resp 25   Ht 160 cm (62.99\")   Wt 77 kg (169 lb 12.8 oz)   SpO2 90%   BMI 30.09 kg/m²     DISCHARGE PHYSICAL EXAM  I did not see the patient at the time she left AMA which was in the middle of the night    Results Review:               Lab Results   Component Value Date    INR 0.99 04/04/2018    INR 0.96 10/23/2017    INR 0.95 10/06/2017     Lab Results   Component Value Date    MG 1.9 04/04/2018     Lab Results   Component Value Date    TSH 1.060 11/16/2015    CHLPL 157 11/16/2015    TRIG 72 04/03/2018    HDL 35 (L) 04/03/2018    LDL 60 04/03/2018      Lab Results   Component Value Date    .0 (H) 04/04/2018    .0 (H) " 04/02/2018    .0 (H) 10/06/2017         DISCHARGE DISPOSITION   Left Against Medical Advice    DISCHARGE MEDICATIONS   Cristine Berkowitz   Home Medication Instructions LEANDRO:[id number]    Printed on:04/15/18 0857   Medication Information                      albuterol (PROVENTIL HFA;VENTOLIN HFA) 108 (90 Base) MCG/ACT inhaler  Inhale 2 puffs Every 4 (Four) Hours As Needed for Wheezing.             albuterol (PROVENTIL) (2.5 MG/3ML) 0.083% nebulizer solution  Take 2.5 mg by nebulization Every 4 (Four) Hours As Needed for Wheezing.                       TEST  RESULTS PENDING AT DISCHARGE         Mike Garcia MD,[facility]  04/15/18  8:57 AM      Time: 30 minutes.    Please send a copy of this dictation to the following providers:  ALONDRA Lott  ----------------------------------------------------------------------------------------------------------------------  Dragon disclaimer:  Much of this encounter note is an electronic transcription/translation of spoken language to printed text. The electronic translation of spoken language may permit erroneous, or at times, nonsensical words or phrases to be inadvertently transcribed; Although I have reviewed the note for such errors, some may still exist.

## 2018-08-13 ENCOUNTER — OFFICE VISIT (OUTPATIENT)
Dept: CARDIOLOGY | Facility: CLINIC | Age: 53
End: 2018-08-13

## 2018-08-13 VITALS
BODY MASS INDEX: 29.83 KG/M2 | SYSTOLIC BLOOD PRESSURE: 154 MMHG | DIASTOLIC BLOOD PRESSURE: 87 MMHG | HEIGHT: 61 IN | HEART RATE: 101 BPM | OXYGEN SATURATION: 90 % | WEIGHT: 158 LBS

## 2018-08-13 DIAGNOSIS — J44.9 CHRONIC OBSTRUCTIVE PULMONARY DISEASE, UNSPECIFIED COPD TYPE (HCC): ICD-10-CM

## 2018-08-13 DIAGNOSIS — I10 UNCONTROLLED HYPERTENSION: ICD-10-CM

## 2018-08-13 DIAGNOSIS — E66.8 MODERATE OBESITY: ICD-10-CM

## 2018-08-13 DIAGNOSIS — I50.9 ACUTE HEART FAILURE, UNSPECIFIED HEART FAILURE TYPE (HCC): Primary | ICD-10-CM

## 2018-08-13 PROBLEM — E66.9 MODERATE OBESITY: Status: ACTIVE | Noted: 2018-08-13

## 2018-08-13 PROCEDURE — 99213 OFFICE O/P EST LOW 20 MIN: CPT | Performed by: INTERNAL MEDICINE

## 2018-08-13 RX ORDER — LOSARTAN POTASSIUM 50 MG/1
50 TABLET ORAL DAILY
Qty: 30 TABLET | Refills: 3 | OUTPATIENT
Start: 2018-08-13 | End: 2020-03-14 | Stop reason: HOSPADM

## 2018-08-13 NOTE — PROGRESS NOTES
Cristine Berkowitz  1965 08/13/2018   Ref:No referring provider defined for this encounter.  Pcp: Afia Lopez, APRN  [address] [facility] [id number]    Follow up for:  Chief Complaint   Patient presents with   • Follow-up   • Med Management     Verbal med list   • Shortness of Breath   • Palpitations     Flutters some.         Patient Active Problem List   Diagnosis   • Acute heart failure, previous   • COPD (chronic obstructive pulmonary disease) (CMS/[facility])   • Uncontrolled hypertension   • Moderate obesity       HPI Ms. Berkowitz is a pleasant 53 yo female who presents for cardiology follow up. She has a history of acute heart failure, COPD and hypertension.    Patient notes having to use 2L of oxygen due to severe dyspnea.  She says she is able to do her routine activities while wearing her O2. She says she is able to cough up clear sputum and it is not thick.she has some wheezing, but less. She has now decreased her smoking to 5 cigarettes/day from previous 3 ppd.   She notes she can walk about 2,000 steps/ day without her O2, and maybe 4,000 steps with her O2.  She is trying to lose weight by walking but has gained 9 pounds.   She denies chest pain. She is not watching her salt intake , but has not had recurrence of ankle swelling since 4/18 hospitalization. She is not taking her Losartan 50 mg because she changed pharmacies.    She denies palpitations but says her heart rate drops to 40-60 bpm and it causes fatigue.     Review of Systems   Cardiovascular: Positive for dyspnea on exertion and palpitations (occasional). Negative for chest pain and syncope.   Neurological: Negative for dizziness.         Current Outpatient Prescriptions:   •  albuterol (PROVENTIL HFA;VENTOLIN HFA) 108 (90 Base) MCG/ACT inhaler, Inhale 2 puffs Every 4 (Four) Hours As Needed for Wheezing., Disp: , Rfl:   •  O2 (OXYGEN), Inhale Every Night., Disp: , Rfl:   •  losartan (COZAAR) 50 MG tablet, Take 1 tablet by  "mouth Daily., Disp: 30 tablet, Rfl: 3    Allergies   Allergen Reactions   • Amoxicillin Anaphylaxis   • Codeine Hives   • Latex Itching   • Rocephin [Ceftriaxone] Itching       /87 (BP Location: Left arm, Patient Position: Sitting, Cuff Size: Adult)   Pulse 101   Ht 154.9 cm (61\")   Wt 71.7 kg (158 lb)   SpO2 90% Comment: At 9:32 am with 2 l of oxygen.  BMI 29.85 kg/m²        Physical Exam   Constitutional: She is oriented to person, place, and time. No distress.   Moderate + obese   Neck: Neck supple. No JVD present. Carotid bruit is not present.   Cardiovascular: S1 normal and S2 normal.  Tachycardia present.  Exam reveals no gallop.    No murmur heard.  Pulses:       Carotid pulses are 2+ on the right side, and 2+ on the left side.       Dorsalis pedis pulses are 2+ on the right side, and 2+ on the left side.        Posterior tibial pulses are 2+ on the right side, and 2+ on the left side.   Pulmonary/Chest: She has decreased breath sounds. She has no wheezes. She has no rhonchi. She has no rales.   Abdominal:   Moderate + obese   Musculoskeletal: She exhibits no edema.   Neurological: She is alert and oriented to person, place, and time.   Skin: Skin is warm and dry.   Psychiatric: She has a normal mood and affect.   :    Lab Review:    Procedures    Lab Results   Component Value Date     04/04/2018    K 4.5 04/04/2018    CL 88 (L) 04/04/2018    CO2 >40.0 (H) 04/04/2018    BUN 10 04/04/2018    CREATININE 0.68 04/04/2018    GLUCOSE 98 04/04/2018    CALCIUM 10.0 04/04/2018    AST 27 04/03/2018    ALT 19 04/03/2018    ALKPHOS 89 04/03/2018    LABIL2 1.2 (L) 11/16/2015     No results found for: CKTOTAL  Lab Results   Component Value Date    WBC 5.87 04/04/2018    HGB 17.9 (H) 04/04/2018    HCT 58.0 (H) 04/04/2018    PLT 97 (L) 04/04/2018     Lab Results   Component Value Date    INR 0.99 04/04/2018    INR 0.96 10/23/2017    INR 0.95 10/06/2017     Lab Results   Component Value Date    MG 1.9 " 04/04/2018     Lab Results   Component Value Date    TSH 1.060 11/16/2015    CHLPL 157 11/16/2015    TRIG 72 04/03/2018    HDL 35 (L) 04/03/2018    LDL 60 04/03/2018      Lab Results   Component Value Date    .0 (H) 04/04/2018       Chemistry        Component Value Date/Time     04/04/2018 1002    K 4.5 04/04/2018 2159    CL 88 (L) 04/04/2018 1002    CO2 >40.0 (H) 04/04/2018 1002    BUN 10 04/04/2018 1002    CREATININE 0.68 04/04/2018 1002        Component Value Date/Time    CALCIUM 10.0 04/04/2018 1002    ALKPHOS 89 04/03/2018 0037    AST 27 04/03/2018 0037    ALT 19 04/03/2018 0037    BILITOT 1.3 04/03/2018 0037            Impression:   Diagnosis Plan   1. Acute heart failure, unspecified heart failure type (CMS/HCC), previous 4/18    2. COPD, O2 dependent, CO2 retention, bronchospasm    3. Uncontrolled hypertension     4. Moderate obesity        Plan:    1. Start Losartan 50 mg daily for hypertension.  Check BP.  Goal is < 120 systolic. Watch sodium intake.  2. I encouraged her to continue decreasing her smoking, and eventually cessation.   3. Follow up in 4 months.         Return in about 4 months (around 12/13/2018) for recheck, or sooner if needed..     This document signed by Perez Christopher MD August 13, 2018 10:42 AM

## 2019-03-10 ENCOUNTER — APPOINTMENT (OUTPATIENT)
Dept: CT IMAGING | Facility: HOSPITAL | Age: 54
End: 2019-03-10

## 2019-03-10 ENCOUNTER — APPOINTMENT (OUTPATIENT)
Dept: GENERAL RADIOLOGY | Facility: HOSPITAL | Age: 54
End: 2019-03-10

## 2019-03-10 ENCOUNTER — HOSPITAL ENCOUNTER (EMERGENCY)
Facility: HOSPITAL | Age: 54
Discharge: HOME | End: 2019-03-10
Attending: EMERGENCY MEDICINE | Admitting: EMERGENCY MEDICINE

## 2019-03-10 VITALS
SYSTOLIC BLOOD PRESSURE: 206 MMHG | BODY MASS INDEX: 30.21 KG/M2 | HEIGHT: 61 IN | OXYGEN SATURATION: 95 % | DIASTOLIC BLOOD PRESSURE: 141 MMHG | HEART RATE: 93 BPM | WEIGHT: 160 LBS | RESPIRATION RATE: 17 BRPM | TEMPERATURE: 98.6 F

## 2019-03-10 DIAGNOSIS — S00.03XA HEMATOMA OF SCALP, INITIAL ENCOUNTER: ICD-10-CM

## 2019-03-10 DIAGNOSIS — S40.011A CONTUSION OF RIGHT SHOULDER, INITIAL ENCOUNTER: ICD-10-CM

## 2019-03-10 DIAGNOSIS — S09.90XA CLOSED HEAD INJURY, INITIAL ENCOUNTER: Primary | ICD-10-CM

## 2019-03-10 PROCEDURE — 73000 X-RAY EXAM OF COLLAR BONE: CPT | Performed by: RADIOLOGY

## 2019-03-10 PROCEDURE — 71045 X-RAY EXAM CHEST 1 VIEW: CPT | Performed by: RADIOLOGY

## 2019-03-10 PROCEDURE — 99284 EMERGENCY DEPT VISIT MOD MDM: CPT

## 2019-03-10 PROCEDURE — 70450 CT HEAD/BRAIN W/O DYE: CPT

## 2019-03-10 PROCEDURE — 70450 CT HEAD/BRAIN W/O DYE: CPT | Performed by: RADIOLOGY

## 2019-03-10 PROCEDURE — 70486 CT MAXILLOFACIAL W/O DYE: CPT | Performed by: RADIOLOGY

## 2019-03-10 PROCEDURE — 73030 X-RAY EXAM OF SHOULDER: CPT | Performed by: RADIOLOGY

## 2019-03-10 PROCEDURE — 73030 X-RAY EXAM OF SHOULDER: CPT

## 2019-03-10 PROCEDURE — 70486 CT MAXILLOFACIAL W/O DYE: CPT

## 2019-03-10 PROCEDURE — 73000 X-RAY EXAM OF COLLAR BONE: CPT

## 2019-03-10 PROCEDURE — 71045 X-RAY EXAM CHEST 1 VIEW: CPT

## 2019-03-10 RX ORDER — IBUPROFEN 400 MG/1
200 TABLET ORAL ONCE
Status: COMPLETED | OUTPATIENT
Start: 2019-03-10 | End: 2019-03-10

## 2019-03-10 RX ORDER — LOSARTAN POTASSIUM 25 MG/1
50 TABLET ORAL ONCE
Status: COMPLETED | OUTPATIENT
Start: 2019-03-10 | End: 2019-03-10

## 2019-03-10 RX ADMIN — LOSARTAN POTASSIUM 50 MG: 25 TABLET, FILM COATED ORAL at 17:11

## 2019-03-10 RX ADMIN — IBUPROFEN 200 MG: 400 TABLET, FILM COATED ORAL at 14:55

## 2019-03-10 NOTE — ED PROVIDER NOTES
Subjective   53-year-old female who presents after a fall.  She states that she slipped in mud, fell backwards, struck the back of her head against the ground.  She also complains of pain in her right shoulder, pain in the right side of her mandible.  She denies loss of consciousness, neck pain, back pain, chest pain, shortness of breath, abdominal pain, nausea, vomiting, diarrhea, hematemesis, hematochezia or melena, syncope or near syncope, focal numbness or weakness, other symptoms or other complaints.  Family who accompanies her advise that she has not had any changes in her mental status.            Review of Systems   Constitutional: Negative for chills, diaphoresis and fever.   HENT: Negative for ear pain, sore throat and trouble swallowing.    Eyes: Negative for photophobia and pain.   Respiratory: Negative for shortness of breath, wheezing and stridor.    Cardiovascular: Negative for chest pain and palpitations.   Gastrointestinal: Negative for abdominal distention, abdominal pain, blood in stool, diarrhea, nausea and vomiting.   Endocrine: Negative for polydipsia and polyphagia.   Genitourinary: Negative for difficulty urinating and flank pain.   Musculoskeletal: Negative for back pain, neck pain and neck stiffness.   Skin: Negative for color change and pallor.   Neurological: Negative for seizures, syncope and speech difficulty.   Psychiatric/Behavioral: Negative for confusion.   All other systems reviewed and are negative.      Past Medical History:   Diagnosis Date   • Arthritis    • Asthma    • CHF (congestive heart failure) (CMS/[facility])    • COPD (chronic obstructive pulmonary disease) (CMS/[facility])    • Coronary artery disease    • Diabetes mellitus (CMS/[facility])    • Hypertension        Allergies   Allergen Reactions   • Amoxicillin Anaphylaxis   • Other Itching     STEROIDS   • Codeine Hives   • Latex Itching   • Rocephin [Ceftriaxone] Itching       Past Surgical History:   Procedure Laterality Date   •  HYSTERECTOMY         Family History   Problem Relation Age of Onset   • Breast cancer Paternal Aunt    • Heart disease Mother    • Heart disease Father    • Heart disease Sister    • Heart disease Brother        Social History     Socioeconomic History   • Marital status:      Spouse name: Not on file   • Number of children: Not on file   • Years of education: Not on file   • Highest education level: Not on file   Tobacco Use   • Smoking status: Current Every Day Smoker     Packs/day: 0.25     Years: 15.00     Pack years: 3.75     Types: Cigarettes   Substance and Sexual Activity   • Alcohol use: No   • Drug use: Defer   • Sexual activity: Defer           Objective   Physical Exam   Constitutional: She is oriented to person, place, and time. She appears well-developed and well-nourished. No distress.   HENT:   Head: Normocephalic.   There is right upper occipital scalp hematoma with small laceration.  This laceration is not full-thickness, does not require repair, should heal well on its own.  I offered sutures, patient refused.   Eyes: EOM are normal. Pupils are equal, round, and reactive to light. No scleral icterus.   Neck: Normal range of motion. Neck supple. No neck rigidity. No tracheal deviation present.   Neck is nontender throughout, full range of painless motion.   Cardiovascular: Normal rate, regular rhythm and intact distal pulses.   Pulmonary/Chest: Effort normal and breath sounds normal. No respiratory distress. She exhibits no tenderness.   Abdominal: Soft. Bowel sounds are normal. There is no tenderness. There is no rebound and no guarding.   Musculoskeletal: Normal range of motion. She exhibits no tenderness.   There is mild tenderness in the mid right clavicle.  There is mild vague nonfocal deltoid tenderness in the right shoulder.  Full range of motion in shoulder.  The extremities are otherwise nontender and atraumatic.  The back is nontender throughout.  The pelvis is stable and  nontender.   Neurological: She is alert and oriented to person, place, and time. She has normal strength. No sensory deficit. She exhibits normal muscle tone. Coordination normal. GCS eye subscore is 4. GCS verbal subscore is 5. GCS motor subscore is 6.   Skin: Skin is warm and dry. Capillary refill takes less than 2 seconds. She is not diaphoretic. No cyanosis. No pallor.   Psychiatric: She has a normal mood and affect. Her behavior is normal.   Nursing note and vitals reviewed.      Procedures  CT Facial Bones Without Contrast   ED Interpretation   Per virtual radiology, no evidence for fracture.      Final Result   No acute facial fractures.        This report was finalized on 3/11/2019 6:54 AM by Dr. Rubin Ureña MD.          XR Chest 1 View   Final Result   No radiographic evidence of acute cardiac or pulmonary   disease.       This report was finalized on 3/11/2019 6:54 AM by Dr. Rubin Ureña MD.          CT Head Without Contrast   ED Interpretation   Per virtual radiology, no evidence for acute intracranial abnormality.      Final Result   No acute intracranial abnormality.       This report was finalized on 3/11/2019 6:54 AM by Dr. Rubin Ureña MD.          XR Shoulder 2+ View Right   Final Result   No acute or destructive bony abnormality.       COMMUNICATION: Per this written report.       This report was finalized on 3/11/2019 6:53 AM by Dr. Rubin Ureña MD.          XR Clavicle Right   Final Result   No acute or destructive bony abnormality.   MILD WIDENING OF THE AC JOINT. CALCIFICATION IN REGION OF DISTAL   SUPRASPINATUS TENDON INSERTION.   COMMUNICATION: Per this written report.       This report was finalized on 3/11/2019 6:53 AM by Dr. Rubin Ureña MD.             The above plain films show what appears to be some ligamentous calcification in the right shoulder, could possibly be a small avulsion fracture; no other apparent acute abnormalities pending radiologist review.           ED  Course  Patient's emergency department stay has been uneventful.  Never has she shown any signs of distress.  She is discharged home in care of family.  Patient reports she has not taken any of her blood pressure medication since day before yesterday, states that she has simply been busy and overlooked it.  She has plenty of it.  She advises she will take it as directed.  She does not want to stay here for treatment/normalization of her blood pressure, wants to go on home.                  MDM      Final diagnoses:   Closed head injury, initial encounter   Hematoma of scalp, initial encounter   Contusion of right shoulder, initial encounter             Please note that portions of this note were completed with a voice recognition program. Efforts were made to edit the dictations, but occasionally words are mistranscribed.       Efren Garcia MD  03/12/19 [id number]

## 2019-03-10 NOTE — DISCHARGE INSTRUCTIONS
Home in care of family.  Tylenol and/or ibuprofen as directed as needed.  Take your blood pressure medication daily as directed, do not miss any doses.  Follow-up with Afia Lopez in the office in 1-2 days for recheck.  Return to the emergency department right away if symptoms worsen/any problems.

## 2019-03-10 NOTE — ED NOTES
"Pt requested b/p taken on wrist. Pt stated she checks b/p on wrist at home and readings show 160-180 for \"top number\"     Timothy Kasper RN  03/10/19 1500    "

## 2019-07-19 ENCOUNTER — HOSPITAL ENCOUNTER (EMERGENCY)
Facility: HOSPITAL | Age: 54
Discharge: ANOTHER HOSPITAL | End: 2019-07-20
Attending: EMERGENCY MEDICINE | Admitting: EMERGENCY MEDICINE

## 2019-07-19 ENCOUNTER — APPOINTMENT (OUTPATIENT)
Dept: GENERAL RADIOLOGY | Facility: HOSPITAL | Age: 54
End: 2019-07-19

## 2019-07-19 ENCOUNTER — APPOINTMENT (OUTPATIENT)
Dept: CT IMAGING | Facility: HOSPITAL | Age: 54
End: 2019-07-19

## 2019-07-19 VITALS
RESPIRATION RATE: 20 BRPM | BODY MASS INDEX: 32.1 KG/M2 | TEMPERATURE: 98 F | DIASTOLIC BLOOD PRESSURE: 91 MMHG | WEIGHT: 170 LBS | HEART RATE: 75 BPM | HEIGHT: 61 IN | OXYGEN SATURATION: 100 % | SYSTOLIC BLOOD PRESSURE: 168 MMHG

## 2019-07-19 DIAGNOSIS — J96.02 ACUTE RESPIRATORY FAILURE WITH HYPOXIA AND HYPERCAPNIA (HCC): Primary | ICD-10-CM

## 2019-07-19 DIAGNOSIS — G93.6 CEREBRAL EDEMA (HCC): ICD-10-CM

## 2019-07-19 DIAGNOSIS — J96.01 ACUTE RESPIRATORY FAILURE WITH HYPOXIA AND HYPERCAPNIA (HCC): Primary | ICD-10-CM

## 2019-07-19 LAB
6-ACETYL MORPHINE: NEGATIVE
A-A DO2: 16.8 MMHG (ref 0–300)
A-A DO2: 171.5 MMHG (ref 0–300)
A-A DO2: 88.3 MMHG (ref 0–300)
ALBUMIN SERPL-MCNC: 3.43 G/DL (ref 3.5–5.2)
ALBUMIN/GLOB SERPL: 1 G/DL
ALP SERPL-CCNC: 58 U/L (ref 39–117)
ALT SERPL W P-5'-P-CCNC: 27 U/L (ref 1–33)
AMPHET+METHAMPHET UR QL: NEGATIVE
ANION GAP SERPL CALCULATED.3IONS-SCNC: 7 MMOL/L (ref 5–15)
ANISOCYTOSIS BLD QL: NORMAL
ARTERIAL PATENCY WRIST A: ABNORMAL
ARTERIAL PATENCY WRIST A: ABNORMAL
ARTERIAL PATENCY WRIST A: POSITIVE
AST SERPL-CCNC: 33 U/L (ref 1–32)
ATMOSPHERIC PRESS: 729 MMHG
BACTERIA UR QL AUTO: NORMAL /HPF
BARBITURATES UR QL SCN: NEGATIVE
BASE EXCESS BLDA CALC-SCNC: 14.4 MMOL/L
BASE EXCESS BLDA CALC-SCNC: 16.5 MMOL/L
BASE EXCESS BLDA CALC-SCNC: 19.5 MMOL/L
BASOPHILS # BLD AUTO: 0.02 10*3/MM3 (ref 0–0.2)
BASOPHILS NFR BLD AUTO: 0.3 % (ref 0–1.5)
BDY SITE: ABNORMAL
BENZODIAZ UR QL SCN: NEGATIVE
BILIRUB SERPL-MCNC: 0.5 MG/DL (ref 0.2–1.2)
BILIRUB UR QL STRIP: NEGATIVE
BODY TEMPERATURE: 98.6 C
BUN BLD-MCNC: 14 MG/DL (ref 6–20)
BUN/CREAT SERPL: 23.3 (ref 7–25)
BUPRENORPHINE SERPL-MCNC: NEGATIVE NG/ML
CALCIUM SPEC-SCNC: 9.1 MG/DL (ref 8.6–10.5)
CANNABINOIDS SERPL QL: NEGATIVE
CHLORIDE SERPL-SCNC: 92 MMOL/L (ref 98–107)
CLARITY UR: CLEAR
CO2 SERPL-SCNC: 40 MMOL/L (ref 22–29)
COCAINE UR QL: NEGATIVE
COHGB MFR BLD: 5.8 % (ref 0–5)
COHGB MFR BLD: 6 % (ref 0–5)
COHGB MFR BLD: 6.5 % (ref 0–5)
COLOR UR: YELLOW
CREAT BLD-MCNC: 0.6 MG/DL (ref 0.57–1)
D-LACTATE SERPL-SCNC: 0.6 MMOL/L (ref 0.5–2)
DEPRECATED RDW RBC AUTO: 65.8 FL (ref 37–54)
EOSINOPHIL # BLD AUTO: 0.05 10*3/MM3 (ref 0–0.4)
EOSINOPHIL NFR BLD AUTO: 0.7 % (ref 0.3–6.2)
EPAP: 6
ERYTHROCYTE [DISTWIDTH] IN BLOOD BY AUTOMATED COUNT: 16.1 % (ref 12.3–15.4)
GFR SERPL CREATININE-BSD FRML MDRD: 105 ML/MIN/1.73
GLOBULIN UR ELPH-MCNC: 3.3 GM/DL
GLUCOSE BLD-MCNC: 120 MG/DL (ref 65–99)
GLUCOSE BLDC GLUCOMTR-MCNC: 111 MG/DL (ref 70–130)
GLUCOSE UR STRIP-MCNC: NEGATIVE MG/DL
HCO3 BLDA-SCNC: 44.8 MMOL/L (ref 22–26)
HCO3 BLDA-SCNC: 51.1 MMOL/L (ref 22–26)
HCO3 BLDA-SCNC: 54.2 MMOL/L (ref 22–26)
HCT VFR BLD AUTO: 54 % (ref 34–46.6)
HCT VFR BLD CALC: 48 % (ref 37–47)
HCT VFR BLD CALC: 49 % (ref 37–47)
HCT VFR BLD CALC: 51 % (ref 37–47)
HGB BLD-MCNC: 15.4 G/DL (ref 12–15.9)
HGB BLDA-MCNC: 16.2 G/DL (ref 12–16)
HGB BLDA-MCNC: 16.7 G/DL (ref 12–16)
HGB BLDA-MCNC: 17.3 G/DL (ref 12–16)
HGB UR QL STRIP.AUTO: NEGATIVE
HOROWITZ INDEX BLD+IHG-RTO: 28 %
HOROWITZ INDEX BLD+IHG-RTO: 35 %
HOROWITZ INDEX BLD+IHG-RTO: 60 %
HYALINE CASTS UR QL AUTO: NORMAL /LPF
HYPOCHROMIA BLD QL: NORMAL
IMM GRANULOCYTES # BLD AUTO: 0.08 10*3/MM3 (ref 0–0.05)
IMM GRANULOCYTES NFR BLD AUTO: 1.1 % (ref 0–0.5)
IPAP: 18
KETONES UR QL STRIP: NEGATIVE
LEUKOCYTE ESTERASE UR QL STRIP.AUTO: NEGATIVE
LYMPHOCYTES # BLD AUTO: 1.38 10*3/MM3 (ref 0.7–3.1)
LYMPHOCYTES NFR BLD AUTO: 19.2 % (ref 19.6–45.3)
MACROCYTES BLD QL SMEAR: NORMAL
MAGNESIUM SERPL-MCNC: 2 MG/DL (ref 1.6–2.6)
MCH RBC QN AUTO: 32.4 PG (ref 26.6–33)
MCHC RBC AUTO-ENTMCNC: 28.5 G/DL (ref 31.5–35.7)
MCV RBC AUTO: 113.7 FL (ref 79–97)
METHADONE UR QL SCN: NEGATIVE
METHGB BLD QL: 0.3 % (ref 0–3)
METHGB BLD QL: 0.4 % (ref 0–3)
METHGB BLD QL: 0.4 % (ref 0–3)
MODALITY: ABNORMAL
MONOCYTES # BLD AUTO: 0.52 10*3/MM3 (ref 0.1–0.9)
MONOCYTES NFR BLD AUTO: 7.2 % (ref 5–12)
NEUTROPHILS # BLD AUTO: 5.14 10*3/MM3 (ref 1.7–7)
NEUTROPHILS NFR BLD AUTO: 71.5 % (ref 42.7–76)
NITRITE UR QL STRIP: NEGATIVE
OPIATES UR QL: NEGATIVE
OXYCODONE UR QL SCN: NEGATIVE
OXYHGB MFR BLDV: 58.4 % (ref 85–100)
OXYHGB MFR BLDV: 83.1 % (ref 85–100)
OXYHGB MFR BLDV: 91.1 % (ref 85–100)
PCO2 BLDA: 116.8 MM HG (ref 35–45)
PCO2 BLDA: 121.8 MM HG (ref 35–45)
PCO2 BLDA: 80.5 MM HG (ref 35–45)
PCP UR QL SCN: NEGATIVE
PEEP RESPIRATORY: 5 CM[H2O]
PH BLDA: 7.26 PH UNITS (ref 7.35–7.45)
PH BLDA: 7.27 PH UNITS (ref 7.35–7.45)
PH BLDA: 7.36 PH UNITS (ref 7.35–7.45)
PH UR STRIP.AUTO: 5.5 [PH] (ref 5–8)
PLATELET # BLD AUTO: 86 10*3/MM3 (ref 140–450)
PMV BLD AUTO: 12.8 FL (ref 6–12)
PO2 BLDA: 103.7 MM HG (ref 80–100)
PO2 BLDA: 36.3 MM HG (ref 80–100)
PO2 BLDA: 56.8 MM HG (ref 80–100)
POTASSIUM BLD-SCNC: 4.8 MMOL/L (ref 3.5–5.2)
PROT SERPL-MCNC: 6.7 G/DL (ref 6–8.5)
PROT UR QL STRIP: ABNORMAL
RBC # BLD AUTO: 4.75 10*6/MM3 (ref 3.77–5.28)
RBC # UR: NORMAL /HPF
REF LAB TEST METHOD: NORMAL
SAO2 % BLDCOA: 62.7 % (ref 90–100)
SAO2 % BLDCOA: 88.5 % (ref 90–100)
SAO2 % BLDCOA: 97.3 % (ref 90–100)
SET MECH RESP RATE: 22
SET MECH RESP RATE: 24
SMALL PLATELETS BLD QL SMEAR: NORMAL
SODIUM BLD-SCNC: 139 MMOL/L (ref 136–145)
SP GR UR STRIP: 1.01 (ref 1–1.03)
SQUAMOUS #/AREA URNS HPF: NORMAL /HPF
STOMATOCYTES BLD QL SMEAR: NORMAL
TROPONIN T SERPL-MCNC: <0.01 NG/ML (ref 0–0.03)
UROBILINOGEN UR QL STRIP: ABNORMAL
VENTILATOR MODE: ABNORMAL
VT ON VENT VENT: 450 ML
WBC NRBC COR # BLD: 7.19 10*3/MM3 (ref 3.4–10.8)
WBC UR QL AUTO: NORMAL /HPF

## 2019-07-19 PROCEDURE — 80307 DRUG TEST PRSMV CHEM ANLYZR: CPT | Performed by: EMERGENCY MEDICINE

## 2019-07-19 PROCEDURE — 93005 ELECTROCARDIOGRAM TRACING: CPT | Performed by: EMERGENCY MEDICINE

## 2019-07-19 PROCEDURE — 70450 CT HEAD/BRAIN W/O DYE: CPT | Performed by: RADIOLOGY

## 2019-07-19 PROCEDURE — 87150 DNA/RNA AMPLIFIED PROBE: CPT | Performed by: EMERGENCY MEDICINE

## 2019-07-19 PROCEDURE — 82962 GLUCOSE BLOOD TEST: CPT

## 2019-07-19 PROCEDURE — 94799 UNLISTED PULMONARY SVC/PX: CPT

## 2019-07-19 PROCEDURE — 82805 BLOOD GASES W/O2 SATURATION: CPT | Performed by: EMERGENCY MEDICINE

## 2019-07-19 PROCEDURE — 96375 TX/PRO/DX INJ NEW DRUG ADDON: CPT

## 2019-07-19 PROCEDURE — 71045 X-RAY EXAM CHEST 1 VIEW: CPT

## 2019-07-19 PROCEDURE — 94003 VENT MGMT INPAT SUBQ DAY: CPT

## 2019-07-19 PROCEDURE — 83605 ASSAY OF LACTIC ACID: CPT | Performed by: EMERGENCY MEDICINE

## 2019-07-19 PROCEDURE — 84484 ASSAY OF TROPONIN QUANT: CPT | Performed by: EMERGENCY MEDICINE

## 2019-07-19 PROCEDURE — 83050 HGB METHEMOGLOBIN QUAN: CPT | Performed by: EMERGENCY MEDICINE

## 2019-07-19 PROCEDURE — 87186 SC STD MICRODIL/AGAR DIL: CPT | Performed by: EMERGENCY MEDICINE

## 2019-07-19 PROCEDURE — 82375 ASSAY CARBOXYHB QUANT: CPT | Performed by: EMERGENCY MEDICINE

## 2019-07-19 PROCEDURE — 99291 CRITICAL CARE FIRST HOUR: CPT

## 2019-07-19 PROCEDURE — 87147 CULTURE TYPE IMMUNOLOGIC: CPT | Performed by: EMERGENCY MEDICINE

## 2019-07-19 PROCEDURE — 36600 WITHDRAWAL OF ARTERIAL BLOOD: CPT | Performed by: EMERGENCY MEDICINE

## 2019-07-19 PROCEDURE — 25010000002 PROPOFOL 10 MG/ML EMULSION

## 2019-07-19 PROCEDURE — 96366 THER/PROPH/DIAG IV INF ADDON: CPT

## 2019-07-19 PROCEDURE — 87040 BLOOD CULTURE FOR BACTERIA: CPT | Performed by: EMERGENCY MEDICINE

## 2019-07-19 PROCEDURE — 71045 X-RAY EXAM CHEST 1 VIEW: CPT | Performed by: RADIOLOGY

## 2019-07-19 PROCEDURE — 96365 THER/PROPH/DIAG IV INF INIT: CPT

## 2019-07-19 PROCEDURE — 31500 INSERT EMERGENCY AIRWAY: CPT

## 2019-07-19 PROCEDURE — 94002 VENT MGMT INPAT INIT DAY: CPT

## 2019-07-19 PROCEDURE — 94640 AIRWAY INHALATION TREATMENT: CPT

## 2019-07-19 PROCEDURE — 93010 ELECTROCARDIOGRAM REPORT: CPT | Performed by: INTERNAL MEDICINE

## 2019-07-19 PROCEDURE — 83735 ASSAY OF MAGNESIUM: CPT | Performed by: EMERGENCY MEDICINE

## 2019-07-19 PROCEDURE — 85025 COMPLETE CBC W/AUTO DIFF WBC: CPT | Performed by: EMERGENCY MEDICINE

## 2019-07-19 PROCEDURE — 70450 CT HEAD/BRAIN W/O DYE: CPT

## 2019-07-19 PROCEDURE — 85007 BL SMEAR W/DIFF WBC COUNT: CPT | Performed by: EMERGENCY MEDICINE

## 2019-07-19 PROCEDURE — 36415 COLL VENOUS BLD VENIPUNCTURE: CPT

## 2019-07-19 PROCEDURE — 94660 CPAP INITIATION&MGMT: CPT

## 2019-07-19 PROCEDURE — 80053 COMPREHEN METABOLIC PANEL: CPT | Performed by: EMERGENCY MEDICINE

## 2019-07-19 PROCEDURE — 51702 INSERT TEMP BLADDER CATH: CPT

## 2019-07-19 PROCEDURE — 25010000002 FENTANYL CITRATE (PF) 100 MCG/2ML SOLUTION: Performed by: EMERGENCY MEDICINE

## 2019-07-19 PROCEDURE — 81001 URINALYSIS AUTO W/SCOPE: CPT | Performed by: EMERGENCY MEDICINE

## 2019-07-19 RX ORDER — ALBUTEROL SULFATE 2.5 MG/3ML
2.5 SOLUTION RESPIRATORY (INHALATION) ONCE
Status: COMPLETED | OUTPATIENT
Start: 2019-07-19 | End: 2019-07-19

## 2019-07-19 RX ORDER — VECURONIUM BROMIDE 1 MG/ML
10 INJECTION, POWDER, LYOPHILIZED, FOR SOLUTION INTRAVENOUS ONCE
Status: COMPLETED | OUTPATIENT
Start: 2019-07-19 | End: 2019-07-19

## 2019-07-19 RX ORDER — PROPOFOL 10 MG/ML
VIAL (ML) INTRAVENOUS
Status: COMPLETED
Start: 2019-07-19 | End: 2019-07-19

## 2019-07-19 RX ORDER — FENTANYL CITRATE 50 UG/ML
100 INJECTION, SOLUTION INTRAMUSCULAR; INTRAVENOUS ONCE
Status: COMPLETED | OUTPATIENT
Start: 2019-07-19 | End: 2019-07-19

## 2019-07-19 RX ORDER — SODIUM CHLORIDE 0.9 % (FLUSH) 0.9 %
10 SYRINGE (ML) INJECTION AS NEEDED
Status: DISCONTINUED | OUTPATIENT
Start: 2019-07-19 | End: 2019-07-20 | Stop reason: HOSPADM

## 2019-07-19 RX ORDER — ETOMIDATE 2 MG/ML
40 INJECTION INTRAVENOUS ONCE
Status: COMPLETED | OUTPATIENT
Start: 2019-07-19 | End: 2019-07-19

## 2019-07-19 RX ORDER — NALOXONE HYDROCHLORIDE 1 MG/ML
4 INJECTION INTRAMUSCULAR; INTRAVENOUS; SUBCUTANEOUS ONCE
Status: COMPLETED | OUTPATIENT
Start: 2019-07-19 | End: 2019-07-19

## 2019-07-19 RX ORDER — IPRATROPIUM BROMIDE AND ALBUTEROL SULFATE 2.5; .5 MG/3ML; MG/3ML
3 SOLUTION RESPIRATORY (INHALATION) ONCE
Status: COMPLETED | OUTPATIENT
Start: 2019-07-19 | End: 2019-07-19

## 2019-07-19 RX ADMIN — FENTANYL CITRATE 100 MCG: 50 INJECTION INTRAMUSCULAR; INTRAVENOUS at 23:36

## 2019-07-19 RX ADMIN — ETOMIDATE 40 MG: 2 INJECTION, SOLUTION INTRAVENOUS at 21:52

## 2019-07-19 RX ADMIN — ALBUTEROL SULFATE 2.5 MG: 2.5 SOLUTION RESPIRATORY (INHALATION) at 22:31

## 2019-07-19 RX ADMIN — IPRATROPIUM BROMIDE AND ALBUTEROL SULFATE 3 ML: .5; 3 SOLUTION RESPIRATORY (INHALATION) at 20:09

## 2019-07-19 RX ADMIN — VECURONIUM BROMIDE 10 MG: 1 INJECTION, POWDER, LYOPHILIZED, FOR SOLUTION INTRAVENOUS at 21:53

## 2019-07-19 RX ADMIN — PROPOFOL 5 MCG/KG/MIN: 10 INJECTION, EMULSION INTRAVENOUS at 22:00

## 2019-07-19 RX ADMIN — NALOXONE HYDROCHLORIDE 4 MG: 1 INJECTION PARENTERAL at 19:43

## 2019-07-20 LAB — BACTERIA BLD CULT: ABNORMAL

## 2019-07-23 LAB
BACTERIA SPEC AEROBE CULT: ABNORMAL
GRAM STN SPEC: ABNORMAL

## 2019-07-24 LAB
BACTERIA SPEC AEROBE CULT: ABNORMAL
GRAM STN SPEC: ABNORMAL
ISOLATED FROM: ABNORMAL

## 2019-07-31 ENCOUNTER — HOSPITAL ENCOUNTER (EMERGENCY)
Facility: HOSPITAL | Age: 54
Discharge: LEFT BEFORE DISCHARGE | End: 2019-07-31
Attending: EMERGENCY MEDICINE | Admitting: EMERGENCY MEDICINE

## 2019-07-31 ENCOUNTER — APPOINTMENT (OUTPATIENT)
Dept: CT IMAGING | Facility: HOSPITAL | Age: 54
End: 2019-07-31

## 2019-07-31 ENCOUNTER — APPOINTMENT (OUTPATIENT)
Dept: GENERAL RADIOLOGY | Facility: HOSPITAL | Age: 54
End: 2019-07-31

## 2019-07-31 VITALS
WEIGHT: 170 LBS | RESPIRATION RATE: 22 BRPM | HEART RATE: 80 BPM | BODY MASS INDEX: 32.1 KG/M2 | HEIGHT: 61 IN | OXYGEN SATURATION: 93 % | TEMPERATURE: 98.2 F | DIASTOLIC BLOOD PRESSURE: 74 MMHG | SYSTOLIC BLOOD PRESSURE: 120 MMHG

## 2019-07-31 DIAGNOSIS — J96.22 ACUTE ON CHRONIC RESPIRATORY FAILURE WITH HYPOXIA AND HYPERCAPNIA (HCC): Primary | ICD-10-CM

## 2019-07-31 DIAGNOSIS — R74.8 CARDIAC ENZYMES ELEVATED: ICD-10-CM

## 2019-07-31 DIAGNOSIS — J44.1 ACUTE EXACERBATION OF CHRONIC OBSTRUCTIVE PULMONARY DISEASE (COPD) (HCC): ICD-10-CM

## 2019-07-31 DIAGNOSIS — J96.21 ACUTE ON CHRONIC RESPIRATORY FAILURE WITH HYPOXIA AND HYPERCAPNIA (HCC): Primary | ICD-10-CM

## 2019-07-31 LAB
6-ACETYL MORPHINE: NEGATIVE
A-A DO2: 134.2 MMHG (ref 0–300)
A-A DO2: 55 MMHG (ref 0–300)
ALBUMIN SERPL-MCNC: 3.37 G/DL (ref 3.5–5.2)
ALBUMIN/GLOB SERPL: 1.1 G/DL
ALP SERPL-CCNC: 49 U/L (ref 39–117)
ALT SERPL W P-5'-P-CCNC: 38 U/L (ref 1–33)
AMPHET+METHAMPHET UR QL: NEGATIVE
ANION GAP SERPL CALCULATED.3IONS-SCNC: 9.6 MMOL/L (ref 5–15)
ARTERIAL PATENCY WRIST A: POSITIVE
ARTERIAL PATENCY WRIST A: POSITIVE
AST SERPL-CCNC: 29 U/L (ref 1–32)
ATMOSPHERIC PRESS: 728 MMHG
ATMOSPHERIC PRESS: 728 MMHG
BACTERIA UR QL AUTO: ABNORMAL /HPF
BARBITURATES UR QL SCN: NEGATIVE
BASE EXCESS BLDA CALC-SCNC: 1.7 MMOL/L
BASE EXCESS BLDA CALC-SCNC: 4.4 MMOL/L
BASOPHILS # BLD AUTO: 0 10*3/MM3 (ref 0–0.2)
BASOPHILS NFR BLD AUTO: 0 % (ref 0–1.5)
BDY SITE: ABNORMAL
BDY SITE: ABNORMAL
BENZODIAZ UR QL SCN: NEGATIVE
BILIRUB SERPL-MCNC: 0.4 MG/DL (ref 0.2–1.2)
BILIRUB UR QL STRIP: NEGATIVE
BODY TEMPERATURE: 98.6 C
BODY TEMPERATURE: 98.6 C
BUN BLD-MCNC: 18 MG/DL (ref 6–20)
BUN/CREAT SERPL: 25 (ref 7–25)
BUPRENORPHINE SERPL-MCNC: NEGATIVE NG/ML
CALCIUM SPEC-SCNC: 9.7 MG/DL (ref 8.6–10.5)
CANNABINOIDS SERPL QL: NEGATIVE
CHLORIDE SERPL-SCNC: 95 MMOL/L (ref 98–107)
CK SERPL-CCNC: 49 U/L (ref 20–180)
CLARITY UR: CLEAR
CO2 SERPL-SCNC: 35.4 MMOL/L (ref 22–29)
COCAINE UR QL: NEGATIVE
COHGB MFR BLD: 1.3 % (ref 0–5)
COHGB MFR BLD: 1.7 % (ref 0–5)
COLOR UR: YELLOW
CREAT BLD-MCNC: 0.72 MG/DL (ref 0.57–1)
D DIMER PPP FEU-MCNC: 3.83 MCGFEU/ML (ref 0–0.5)
D-LACTATE SERPL-SCNC: 0.9 MMOL/L (ref 0.5–2)
D-LACTATE SERPL-SCNC: 2.3 MMOL/L (ref 0.5–2)
DEPRECATED RDW RBC AUTO: 55 FL (ref 37–54)
EOSINOPHIL # BLD AUTO: 0 10*3/MM3 (ref 0–0.4)
EOSINOPHIL NFR BLD AUTO: 0 % (ref 0.3–6.2)
EPAP: 5
ERYTHROCYTE [DISTWIDTH] IN BLOOD BY AUTOMATED COUNT: 14.1 % (ref 12.3–15.4)
GFR SERPL CREATININE-BSD FRML MDRD: 85 ML/MIN/1.73
GLOBULIN UR ELPH-MCNC: 3.1 GM/DL
GLUCOSE BLD-MCNC: 115 MG/DL (ref 65–99)
GLUCOSE UR STRIP-MCNC: NEGATIVE MG/DL
GRAN CASTS URNS QL MICRO: ABNORMAL /LPF
HCO3 BLDA-SCNC: 30.6 MMOL/L (ref 22–26)
HCO3 BLDA-SCNC: 31.2 MMOL/L (ref 22–26)
HCT VFR BLD AUTO: 52.1 % (ref 34–46.6)
HCT VFR BLD CALC: 47 % (ref 37–47)
HCT VFR BLD CALC: 50 % (ref 37–47)
HGB BLD-MCNC: 15.8 G/DL (ref 12–15.9)
HGB BLDA-MCNC: 16 G/DL (ref 12–16)
HGB BLDA-MCNC: 17 G/DL (ref 12–16)
HGB UR QL STRIP.AUTO: NEGATIVE
HOLD SPECIMEN: NORMAL
HOROWITZ INDEX BLD+IHG-RTO: 28 %
HOROWITZ INDEX BLD+IHG-RTO: 40 %
HYALINE CASTS UR QL AUTO: ABNORMAL /LPF
IMM GRANULOCYTES # BLD AUTO: 0.03 10*3/MM3 (ref 0–0.05)
IMM GRANULOCYTES NFR BLD AUTO: 0.2 % (ref 0–0.5)
IPAP: 18
KETONES UR QL STRIP: NEGATIVE
LEUKOCYTE ESTERASE UR QL STRIP.AUTO: NEGATIVE
LYMPHOCYTES # BLD AUTO: 0.71 10*3/MM3 (ref 0.7–3.1)
LYMPHOCYTES NFR BLD AUTO: 4.9 % (ref 19.6–45.3)
MCH RBC QN AUTO: 31.9 PG (ref 26.6–33)
MCHC RBC AUTO-ENTMCNC: 30.3 G/DL (ref 31.5–35.7)
MCV RBC AUTO: 105 FL (ref 79–97)
METHADONE UR QL SCN: NEGATIVE
METHGB BLD QL: 0.3 % (ref 0–3)
METHGB BLD QL: 0.3 % (ref 0–3)
MODALITY: ABNORMAL
MODALITY: ABNORMAL
MONOCYTES # BLD AUTO: 0.64 10*3/MM3 (ref 0.1–0.9)
MONOCYTES NFR BLD AUTO: 4.4 % (ref 5–12)
NEUTROPHILS # BLD AUTO: 13.16 10*3/MM3 (ref 1.7–7)
NEUTROPHILS NFR BLD AUTO: 90.5 % (ref 42.7–76)
NITRITE UR QL STRIP: NEGATIVE
NT-PROBNP SERPL-MCNC: 4102 PG/ML (ref 5–900)
OPIATES UR QL: NEGATIVE
OXYCODONE UR QL SCN: NEGATIVE
OXYHGB MFR BLDV: 83.2 % (ref 85–100)
OXYHGB MFR BLDV: 92.6 % (ref 85–100)
PCO2 BLDA: 54.5 MM HG (ref 35–45)
PCO2 BLDA: 65.6 MM HG (ref 35–45)
PCP UR QL SCN: NEGATIVE
PH BLDA: 7.29 PH UNITS (ref 7.35–7.45)
PH BLDA: 7.38 PH UNITS (ref 7.35–7.45)
PH UR STRIP.AUTO: 6 [PH] (ref 5–8)
PLATELET # BLD AUTO: 151 10*3/MM3 (ref 140–450)
PMV BLD AUTO: 12.6 FL (ref 6–12)
PO2 BLDA: 58.3 MM HG (ref 80–100)
PO2 BLDA: 75.5 MM HG (ref 80–100)
POTASSIUM BLD-SCNC: 3.5 MMOL/L (ref 3.5–5.2)
PROT SERPL-MCNC: 6.5 G/DL (ref 6–8.5)
PROT UR QL STRIP: ABNORMAL
RBC # BLD AUTO: 4.96 10*6/MM3 (ref 3.77–5.28)
RBC # UR: ABNORMAL /HPF
REF LAB TEST METHOD: ABNORMAL
SAO2 % BLDCOA: 84.6 % (ref 90–100)
SAO2 % BLDCOA: 94.5 % (ref 90–100)
SET MECH RESP RATE: 22
SODIUM BLD-SCNC: 140 MMOL/L (ref 136–145)
SP GR UR STRIP: 1.01 (ref 1–1.03)
SQUAMOUS #/AREA URNS HPF: ABNORMAL /HPF
TROPONIN T SERPL-MCNC: 0.12 NG/ML (ref 0–0.03)
TROPONIN T SERPL-MCNC: 0.14 NG/ML (ref 0–0.03)
UROBILINOGEN UR QL STRIP: ABNORMAL
WBC NRBC COR # BLD: 14.54 10*3/MM3 (ref 3.4–10.8)
WBC UR QL AUTO: ABNORMAL /HPF

## 2019-07-31 PROCEDURE — 85025 COMPLETE CBC W/AUTO DIFF WBC: CPT | Performed by: EMERGENCY MEDICINE

## 2019-07-31 PROCEDURE — 80307 DRUG TEST PRSMV CHEM ANLYZR: CPT | Performed by: EMERGENCY MEDICINE

## 2019-07-31 PROCEDURE — 71045 X-RAY EXAM CHEST 1 VIEW: CPT

## 2019-07-31 PROCEDURE — 70450 CT HEAD/BRAIN W/O DYE: CPT

## 2019-07-31 PROCEDURE — 96375 TX/PRO/DX INJ NEW DRUG ADDON: CPT

## 2019-07-31 PROCEDURE — 25010000002 ENOXAPARIN PER 10 MG: Performed by: EMERGENCY MEDICINE

## 2019-07-31 PROCEDURE — 94799 UNLISTED PULMONARY SVC/PX: CPT

## 2019-07-31 PROCEDURE — 25010000002 VANCOMYCIN 5 G RECONSTITUTED SOLUTION 5,000 MG VIAL: Performed by: EMERGENCY MEDICINE

## 2019-07-31 PROCEDURE — 80053 COMPREHEN METABOLIC PANEL: CPT | Performed by: EMERGENCY MEDICINE

## 2019-07-31 PROCEDURE — 83880 ASSAY OF NATRIURETIC PEPTIDE: CPT | Performed by: EMERGENCY MEDICINE

## 2019-07-31 PROCEDURE — 96372 THER/PROPH/DIAG INJ SC/IM: CPT

## 2019-07-31 PROCEDURE — 25010000002 FUROSEMIDE PER 20 MG: Performed by: EMERGENCY MEDICINE

## 2019-07-31 PROCEDURE — 85379 FIBRIN DEGRADATION QUANT: CPT | Performed by: EMERGENCY MEDICINE

## 2019-07-31 PROCEDURE — 82550 ASSAY OF CK (CPK): CPT | Performed by: EMERGENCY MEDICINE

## 2019-07-31 PROCEDURE — 71275 CT ANGIOGRAPHY CHEST: CPT

## 2019-07-31 PROCEDURE — 96365 THER/PROPH/DIAG IV INF INIT: CPT

## 2019-07-31 PROCEDURE — 99285 EMERGENCY DEPT VISIT HI MDM: CPT

## 2019-07-31 PROCEDURE — 83050 HGB METHEMOGLOBIN QUAN: CPT | Performed by: EMERGENCY MEDICINE

## 2019-07-31 PROCEDURE — 70450 CT HEAD/BRAIN W/O DYE: CPT | Performed by: RADIOLOGY

## 2019-07-31 PROCEDURE — 93005 ELECTROCARDIOGRAM TRACING: CPT | Performed by: EMERGENCY MEDICINE

## 2019-07-31 PROCEDURE — 82805 BLOOD GASES W/O2 SATURATION: CPT | Performed by: EMERGENCY MEDICINE

## 2019-07-31 PROCEDURE — 94640 AIRWAY INHALATION TREATMENT: CPT

## 2019-07-31 PROCEDURE — 36600 WITHDRAWAL OF ARTERIAL BLOOD: CPT | Performed by: EMERGENCY MEDICINE

## 2019-07-31 PROCEDURE — 71275 CT ANGIOGRAPHY CHEST: CPT | Performed by: RADIOLOGY

## 2019-07-31 PROCEDURE — 25010000002 METHYLPREDNISOLONE PER 125 MG: Performed by: EMERGENCY MEDICINE

## 2019-07-31 PROCEDURE — 71045 X-RAY EXAM CHEST 1 VIEW: CPT | Performed by: RADIOLOGY

## 2019-07-31 PROCEDURE — 82375 ASSAY CARBOXYHB QUANT: CPT | Performed by: EMERGENCY MEDICINE

## 2019-07-31 PROCEDURE — 96366 THER/PROPH/DIAG IV INF ADDON: CPT

## 2019-07-31 PROCEDURE — 87040 BLOOD CULTURE FOR BACTERIA: CPT | Performed by: EMERGENCY MEDICINE

## 2019-07-31 PROCEDURE — 83605 ASSAY OF LACTIC ACID: CPT | Performed by: EMERGENCY MEDICINE

## 2019-07-31 PROCEDURE — 84484 ASSAY OF TROPONIN QUANT: CPT | Performed by: EMERGENCY MEDICINE

## 2019-07-31 PROCEDURE — 51702 INSERT TEMP BLADDER CATH: CPT

## 2019-07-31 PROCEDURE — 81001 URINALYSIS AUTO W/SCOPE: CPT | Performed by: EMERGENCY MEDICINE

## 2019-07-31 PROCEDURE — 36415 COLL VENOUS BLD VENIPUNCTURE: CPT

## 2019-07-31 PROCEDURE — 0 IOVERSOL 74 % SOLUTION: Performed by: EMERGENCY MEDICINE

## 2019-07-31 PROCEDURE — 94660 CPAP INITIATION&MGMT: CPT

## 2019-07-31 RX ORDER — SODIUM CHLORIDE 0.9 % (FLUSH) 0.9 %
10 SYRINGE (ML) INJECTION AS NEEDED
Status: DISCONTINUED | OUTPATIENT
Start: 2019-07-31 | End: 2019-07-31 | Stop reason: HOSPADM

## 2019-07-31 RX ORDER — METHYLPREDNISOLONE SODIUM SUCCINATE 125 MG/2ML
80 INJECTION, POWDER, LYOPHILIZED, FOR SOLUTION INTRAMUSCULAR; INTRAVENOUS ONCE
Status: COMPLETED | OUTPATIENT
Start: 2019-07-31 | End: 2019-07-31

## 2019-07-31 RX ORDER — FUROSEMIDE 10 MG/ML
40 INJECTION INTRAMUSCULAR; INTRAVENOUS ONCE
Status: COMPLETED | OUTPATIENT
Start: 2019-07-31 | End: 2019-07-31

## 2019-07-31 RX ORDER — ASPIRIN 81 MG/1
324 TABLET, CHEWABLE ORAL ONCE
Status: COMPLETED | OUTPATIENT
Start: 2019-07-31 | End: 2019-07-31

## 2019-07-31 RX ORDER — IPRATROPIUM BROMIDE AND ALBUTEROL SULFATE 2.5; .5 MG/3ML; MG/3ML
3 SOLUTION RESPIRATORY (INHALATION) ONCE
Status: COMPLETED | OUTPATIENT
Start: 2019-07-31 | End: 2019-07-31

## 2019-07-31 RX ORDER — METHYLPREDNISOLONE 4 MG/1
TABLET ORAL
Qty: 1 EACH | Refills: 0 | OUTPATIENT
Start: 2019-07-31 | End: 2020-03-14 | Stop reason: HOSPADM

## 2019-07-31 RX ORDER — SODIUM CHLORIDE, SODIUM LACTATE, POTASSIUM CHLORIDE, CALCIUM CHLORIDE 600; 310; 30; 20 MG/100ML; MG/100ML; MG/100ML; MG/100ML
INJECTION, SOLUTION INTRAVENOUS
Status: COMPLETED
Start: 2019-07-31 | End: 2019-07-31

## 2019-07-31 RX ORDER — DOXYCYCLINE 100 MG/1
100 CAPSULE ORAL 2 TIMES DAILY
Qty: 20 CAPSULE | Refills: 0 | OUTPATIENT
Start: 2019-07-31 | End: 2020-03-14 | Stop reason: HOSPADM

## 2019-07-31 RX ADMIN — SODIUM CHLORIDE, POTASSIUM CHLORIDE, SODIUM LACTATE AND CALCIUM CHLORIDE 500 ML: 600; 310; 30; 20 INJECTION, SOLUTION INTRAVENOUS at 08:00

## 2019-07-31 RX ADMIN — METHYLPREDNISOLONE SODIUM SUCCINATE 80 MG: 125 INJECTION, POWDER, FOR SOLUTION INTRAMUSCULAR; INTRAVENOUS at 09:05

## 2019-07-31 RX ADMIN — AZTREONAM 2 G: 2 INJECTION, POWDER, FOR SOLUTION INTRAMUSCULAR; INTRAVENOUS at 06:13

## 2019-07-31 RX ADMIN — IPRATROPIUM BROMIDE AND ALBUTEROL SULFATE 3 ML: .5; 3 SOLUTION RESPIRATORY (INHALATION) at 02:57

## 2019-07-31 RX ADMIN — IOVERSOL 95 ML: 741 INJECTION INTRA-ARTERIAL; INTRAVENOUS at 07:12

## 2019-07-31 RX ADMIN — ENOXAPARIN SODIUM 80 MG: 80 INJECTION SUBCUTANEOUS at 09:14

## 2019-07-31 RX ADMIN — FUROSEMIDE 40 MG: 10 INJECTION, SOLUTION INTRAMUSCULAR; INTRAVENOUS at 07:56

## 2019-07-31 RX ADMIN — ASPIRIN 324 MG: 81 TABLET, CHEWABLE ORAL at 09:09

## 2019-07-31 RX ADMIN — VANCOMYCIN HYDROCHLORIDE 1500 MG: 5 INJECTION, POWDER, LYOPHILIZED, FOR SOLUTION INTRAVENOUS at 06:17

## 2019-08-05 LAB
BACTERIA SPEC AEROBE CULT: NORMAL
BACTERIA SPEC AEROBE CULT: NORMAL

## 2019-08-08 ENCOUNTER — APPOINTMENT (OUTPATIENT)
Dept: CT IMAGING | Facility: HOSPITAL | Age: 54
End: 2019-08-08

## 2019-08-08 ENCOUNTER — HOSPITAL ENCOUNTER (EMERGENCY)
Facility: HOSPITAL | Age: 54
Discharge: ANOTHER HOSPITAL | End: 2019-08-08
Attending: EMERGENCY MEDICINE | Admitting: EMERGENCY MEDICINE

## 2019-08-08 ENCOUNTER — APPOINTMENT (OUTPATIENT)
Dept: GENERAL RADIOLOGY | Facility: HOSPITAL | Age: 54
End: 2019-08-08

## 2019-08-08 VITALS
BODY MASS INDEX: 29.16 KG/M2 | SYSTOLIC BLOOD PRESSURE: 124 MMHG | RESPIRATION RATE: 20 BRPM | TEMPERATURE: 99 F | OXYGEN SATURATION: 95 % | WEIGHT: 175 LBS | DIASTOLIC BLOOD PRESSURE: 58 MMHG | HEART RATE: 74 BPM | HEIGHT: 65 IN

## 2019-08-08 DIAGNOSIS — G40.901 STATUS EPILEPTICUS (HCC): Primary | ICD-10-CM

## 2019-08-08 LAB
6-ACETYL MORPHINE: NEGATIVE
ALBUMIN SERPL-MCNC: 3.51 G/DL (ref 3.5–5.2)
ALBUMIN/GLOB SERPL: 1.1 G/DL
ALP SERPL-CCNC: 67 U/L (ref 39–117)
ALT SERPL W P-5'-P-CCNC: 37 U/L (ref 1–33)
AMPHET+METHAMPHET UR QL: NEGATIVE
ANION GAP SERPL CALCULATED.3IONS-SCNC: 11.7 MMOL/L (ref 5–15)
AST SERPL-CCNC: 21 U/L (ref 1–32)
BACTERIA UR QL AUTO: ABNORMAL /HPF
BARBITURATES UR QL SCN: NEGATIVE
BASOPHILS # BLD AUTO: 0.03 10*3/MM3 (ref 0–0.2)
BASOPHILS NFR BLD AUTO: 0.3 % (ref 0–1.5)
BENZODIAZ UR QL SCN: POSITIVE
BILIRUB SERPL-MCNC: 0.4 MG/DL (ref 0.2–1.2)
BILIRUB UR QL STRIP: NEGATIVE
BUN BLD-MCNC: 13 MG/DL (ref 6–20)
BUN/CREAT SERPL: 23.2 (ref 7–25)
BUPRENORPHINE SERPL-MCNC: NEGATIVE NG/ML
CALCIUM SPEC-SCNC: 9.4 MG/DL (ref 8.6–10.5)
CANNABINOIDS SERPL QL: NEGATIVE
CHLORIDE SERPL-SCNC: 97 MMOL/L (ref 98–107)
CK SERPL-CCNC: 32 U/L (ref 20–180)
CLARITY UR: CLEAR
CO2 SERPL-SCNC: 34.3 MMOL/L (ref 22–29)
COCAINE UR QL: NEGATIVE
COLOR UR: YELLOW
CREAT BLD-MCNC: 0.56 MG/DL (ref 0.57–1)
D-LACTATE SERPL-SCNC: 0.9 MMOL/L (ref 0.5–2)
D-LACTATE SERPL-SCNC: 3.4 MMOL/L (ref 0.5–2)
DEPRECATED RDW RBC AUTO: 52.4 FL (ref 37–54)
EOSINOPHIL # BLD AUTO: 0.07 10*3/MM3 (ref 0–0.4)
EOSINOPHIL NFR BLD AUTO: 0.6 % (ref 0.3–6.2)
ERYTHROCYTE [DISTWIDTH] IN BLOOD BY AUTOMATED COUNT: 13.7 % (ref 12.3–15.4)
GFR SERPL CREATININE-BSD FRML MDRD: 114 ML/MIN/1.73
GLOBULIN UR ELPH-MCNC: 3.1 GM/DL
GLUCOSE BLD-MCNC: 170 MG/DL (ref 65–99)
GLUCOSE UR STRIP-MCNC: NEGATIVE MG/DL
HCT VFR BLD AUTO: 48.7 % (ref 34–46.6)
HGB BLD-MCNC: 15.3 G/DL (ref 12–15.9)
HGB UR QL STRIP.AUTO: ABNORMAL
HOLD SPECIMEN: NORMAL
HYALINE CASTS UR QL AUTO: ABNORMAL /LPF
IMM GRANULOCYTES # BLD AUTO: 0.03 10*3/MM3 (ref 0–0.05)
IMM GRANULOCYTES NFR BLD AUTO: 0.3 % (ref 0–0.5)
KETONES UR QL STRIP: NEGATIVE
LEUKOCYTE ESTERASE UR QL STRIP.AUTO: NEGATIVE
LYMPHOCYTES # BLD AUTO: 0.67 10*3/MM3 (ref 0.7–3.1)
LYMPHOCYTES NFR BLD AUTO: 6 % (ref 19.6–45.3)
MCH RBC QN AUTO: 32.3 PG (ref 26.6–33)
MCHC RBC AUTO-ENTMCNC: 31.4 G/DL (ref 31.5–35.7)
MCV RBC AUTO: 103 FL (ref 79–97)
METHADONE UR QL SCN: NEGATIVE
MONOCYTES # BLD AUTO: 0.38 10*3/MM3 (ref 0.1–0.9)
MONOCYTES NFR BLD AUTO: 3.4 % (ref 5–12)
NEUTROPHILS # BLD AUTO: 10.04 10*3/MM3 (ref 1.7–7)
NEUTROPHILS NFR BLD AUTO: 89.4 % (ref 42.7–76)
NITRITE UR QL STRIP: NEGATIVE
OPIATES UR QL: NEGATIVE
OXYCODONE UR QL SCN: NEGATIVE
PCP UR QL SCN: NEGATIVE
PH UR STRIP.AUTO: 7 [PH] (ref 5–8)
PLATELET # BLD AUTO: 184 10*3/MM3 (ref 140–450)
PMV BLD AUTO: 11.6 FL (ref 6–12)
POTASSIUM BLD-SCNC: 3.6 MMOL/L (ref 3.5–5.2)
PROT SERPL-MCNC: 6.6 G/DL (ref 6–8.5)
PROT UR QL STRIP: ABNORMAL
RBC # BLD AUTO: 4.73 10*6/MM3 (ref 3.77–5.28)
RBC # UR: ABNORMAL /HPF
REF LAB TEST METHOD: ABNORMAL
SODIUM BLD-SCNC: 143 MMOL/L (ref 136–145)
SP GR UR STRIP: 1.01 (ref 1–1.03)
SQUAMOUS #/AREA URNS HPF: ABNORMAL /HPF
UROBILINOGEN UR QL STRIP: ABNORMAL
WBC NRBC COR # BLD: 11.22 10*3/MM3 (ref 3.4–10.8)
WBC UR QL AUTO: ABNORMAL /HPF

## 2019-08-08 PROCEDURE — 96361 HYDRATE IV INFUSION ADD-ON: CPT

## 2019-08-08 PROCEDURE — 80307 DRUG TEST PRSMV CHEM ANLYZR: CPT | Performed by: EMERGENCY MEDICINE

## 2019-08-08 PROCEDURE — 70450 CT HEAD/BRAIN W/O DYE: CPT

## 2019-08-08 PROCEDURE — 25010000002 LEVETRIRACETAM PER 10 MG: Performed by: EMERGENCY MEDICINE

## 2019-08-08 PROCEDURE — 96374 THER/PROPH/DIAG INJ IV PUSH: CPT

## 2019-08-08 PROCEDURE — 85025 COMPLETE CBC W/AUTO DIFF WBC: CPT | Performed by: EMERGENCY MEDICINE

## 2019-08-08 PROCEDURE — 36415 COLL VENOUS BLD VENIPUNCTURE: CPT

## 2019-08-08 PROCEDURE — 81001 URINALYSIS AUTO W/SCOPE: CPT | Performed by: EMERGENCY MEDICINE

## 2019-08-08 PROCEDURE — 82550 ASSAY OF CK (CPK): CPT | Performed by: EMERGENCY MEDICINE

## 2019-08-08 PROCEDURE — 83605 ASSAY OF LACTIC ACID: CPT | Performed by: EMERGENCY MEDICINE

## 2019-08-08 PROCEDURE — 25010000002 LORAZEPAM PER 2 MG

## 2019-08-08 PROCEDURE — 71045 X-RAY EXAM CHEST 1 VIEW: CPT | Performed by: RADIOLOGY

## 2019-08-08 PROCEDURE — 96375 TX/PRO/DX INJ NEW DRUG ADDON: CPT

## 2019-08-08 PROCEDURE — 71045 X-RAY EXAM CHEST 1 VIEW: CPT

## 2019-08-08 PROCEDURE — 99285 EMERGENCY DEPT VISIT HI MDM: CPT

## 2019-08-08 PROCEDURE — 80053 COMPREHEN METABOLIC PANEL: CPT | Performed by: EMERGENCY MEDICINE

## 2019-08-08 RX ORDER — ONDANSETRON 2 MG/ML
4 INJECTION INTRAMUSCULAR; INTRAVENOUS ONCE
Status: DISCONTINUED | OUTPATIENT
Start: 2019-08-08 | End: 2019-08-08

## 2019-08-08 RX ORDER — SODIUM CHLORIDE 9 MG/ML
125 INJECTION, SOLUTION INTRAVENOUS CONTINUOUS
Status: DISCONTINUED | OUTPATIENT
Start: 2019-08-08 | End: 2019-08-08 | Stop reason: HOSPADM

## 2019-08-08 RX ORDER — LORAZEPAM 2 MG/ML
1 INJECTION INTRAMUSCULAR ONCE
Status: COMPLETED | OUTPATIENT
Start: 2019-08-08 | End: 2019-08-08

## 2019-08-08 RX ORDER — SODIUM CHLORIDE 0.9 % (FLUSH) 0.9 %
10 SYRINGE (ML) INJECTION AS NEEDED
Status: DISCONTINUED | OUTPATIENT
Start: 2019-08-08 | End: 2019-08-08 | Stop reason: HOSPADM

## 2019-08-08 RX ORDER — LORAZEPAM 2 MG/ML
INJECTION INTRAMUSCULAR
Status: COMPLETED
Start: 2019-08-08 | End: 2019-08-08

## 2019-08-08 RX ADMIN — LORAZEPAM 1 MG: 2 INJECTION INTRAMUSCULAR; INTRAVENOUS at 03:58

## 2019-08-08 RX ADMIN — SODIUM CHLORIDE 1500 MG: 9 INJECTION, SOLUTION INTRAVENOUS at 04:05

## 2019-08-08 RX ADMIN — SODIUM CHLORIDE 125 ML/HR: 9 INJECTION, SOLUTION INTRAVENOUS at 03:30

## 2019-08-08 RX ADMIN — LORAZEPAM 1 MG: 2 INJECTION INTRAMUSCULAR at 03:58

## 2019-08-08 NOTE — ED PROVIDER NOTES
"Subjective   Pt brought from home by local EMS for seizure.  Per EMS pt has a history of seizure and some question of \"brain swelling\".  Family reported seizure at home.  Out of it after seizure.  EMS reports 2 seizures during transport, treated/aborted with 2.5mg Versed.        History provided by:  EMS personnel, relative and medical records  History limited by:  Mental status change  Seizures   Seizure activity on arrival: no    Seizure type:  Grand mal  Initial focality:  None  Episode characteristics: abnormal movements, combativeness, confusion, disorientation, generalized shaking, incontinence, stiffening and unresponsiveness    Postictal symptoms: confusion and somnolence    Return to baseline: no    Severity:  Severe  Timing:  Clustered  Progression:  Worsening  Context: stress    Context: not alcohol withdrawal, not cerebral palsy, not change in medication, not sleeping less, not developmental delay, not drug use, not family hx of seizures, not fever, not flashing visual stimuli, not intracranial lesion, not intracranial shunt, not possible hypoglycemia, not possible medication ingestion and not pregnant    Recent head injury:  No recent head injuries  PTA treatment:  Midazolam  History of seizures: yes        Review of Systems   Unable to perform ROS: Mental status change   HENT: Negative for nosebleeds.    Respiratory: Positive for shortness of breath. Negative for chest tightness.    Gastrointestinal: Negative for vomiting.   Neurological: Positive for seizures.   Psychiatric/Behavioral: Positive for confusion and decreased concentration.       Past Medical History:   Diagnosis Date   • Arthritis    • Asthma    • CHF (congestive heart failure) (CMS/[facility])    • COPD (chronic obstructive pulmonary disease) (CMS/[facility])    • Coronary artery disease    • Diabetes mellitus (CMS/[facility])    • Hypertension        Allergies   Allergen Reactions   • Amoxicillin Anaphylaxis   • Other Itching     STEROIDS   • Codeine Hives "   • Latex Itching   • Rocephin [Ceftriaxone] Itching       Past Surgical History:   Procedure Laterality Date   • HYSTERECTOMY         Family History   Problem Relation Age of Onset   • Breast cancer Paternal Aunt    • Heart disease Mother    • Heart disease Father    • Heart disease Sister    • Heart disease Brother        Social History     Socioeconomic History   • Marital status:      Spouse name: Not on file   • Number of children: Not on file   • Years of education: Not on file   • Highest education level: Not on file   Tobacco Use   • Smoking status: Current Every Day Smoker     Packs/day: 0.25     Years: 15.00     Pack years: 3.75     Types: Cigarettes   • Smokeless tobacco: Never Used   Substance and Sexual Activity   • Alcohol use: No   • Drug use: Defer   • Sexual activity: Defer           Objective   Physical Exam   Constitutional: She appears well-developed and well-nourished.   Smonolent/lethargic/post-ictal   HENT:   Head: Normocephalic and atraumatic.   Right Ear: External ear normal.   Left Ear: External ear normal.   Nose: Nose normal.   Mouth/Throat: Oropharynx is clear and moist.   Eyes: Conjunctivae and EOM are normal. Pupils are equal, round, and reactive to light. Right eye exhibits no discharge. Left eye exhibits no discharge.   Neck: Normal range of motion. Neck supple. No tracheal deviation present.   Cardiovascular: Normal rate, regular rhythm, normal heart sounds and intact distal pulses. Exam reveals no gallop and no friction rub.   No murmur heard.  Pulmonary/Chest: Effort normal and breath sounds normal. No stridor. No respiratory distress. She has no wheezes. She has no rales.   Abdominal: Soft. She exhibits no distension and no mass. There is no tenderness. There is no guarding.   Musculoskeletal: She exhibits no tenderness or deformity.   Neurological:   Somnolent, post-ictal   Skin: Skin is warm and dry. Capillary refill takes less than 2 seconds. There is pallor.    Psychiatric:   Unable to assess   Nursing note and vitals reviewed.      Procedures           ED Course  ED Course as of Aug 08 0748   Thu Aug 08, 2019   0615 Patient had a seizure while in the emergency department.  Generalized tonic-clonic.  Aborted with 1 mg of Ativan.  Patient treated with Keppra afterwards.  Patient grossly obtunded.  She had blowing/sonorous respirations.  Nasal trumpet was placed to assist oxygenation.  [TZ]   0640 D/W Dr Orozco.  [facility] on divert unable to accept acute transfer  [TZ]   0654 D/W -Dr King Quezada, [facility]  [TZ]      ED Course User Index  [TZ] Conrado Freeman MD      CT Head Without Contrast   Final Result   Negative head CT examination.      Signer Name: Glynn Doll MD    Signed: 8/8/2019 5:39 AM    Workstation Name: [id number]     [facility]        Labs Reviewed   COMPREHENSIVE METABOLIC PANEL - Abnormal; Notable for the following components:       Result Value    Glucose 170 (*)     Creatinine 0.56 (*)     Chloride 97 (*)     CO2 34.3 (*)     ALT (SGPT) 37 (*)     All other components within normal limits    Narrative:     GFR Normal >60  Chronic Kidney Disease <60  Kidney Failure <15   URINALYSIS W/ MICROSCOPIC IF INDICATED (NO CULTURE) - Abnormal; Notable for the following components:    Blood, UA Small (1+) (*)     Protein,  mg/dL (2+) (*)     All other components within normal limits   URINE DRUG SCREEN - Abnormal; Notable for the following components:    Benzodiazepine Screen, Urine Positive (*)     All other components within normal limits    Narrative:     Negative Thresholds For Drugs Screened:                  Amphetamines              1000 ng/ml               Barbiturates               200 ng/ml               Benzodiazepines            200 ng/ml              Cocaine                    300 ng/ml              Methadone                  300 ng/ml              Opiates                     300 ng/ml               Phencyclidine               25 ng/ml               THC                         50 ng/ml              6-Acetyl Morphine           10 ng/ml              Buprenorphine                5 ng/ml              Oxycodone                  300 ng/ml    The reference range for all drugs tested is negative. This report includes final unconfirmed qualitative results to be used for medical treatment purposes only. Unconfirmed results must not be used for non-medical purposes such as employment or legal testing. Clinical consideration should be applied to any drug of abuse test, especially when unconfirmed quantitative results are used.     LACTIC ACID, PLASMA - Abnormal; Notable for the following components:    Lactate 3.4 (*)     All other components within normal limits   CBC WITH AUTO DIFFERENTIAL - Abnormal; Notable for the following components:    WBC 11.22 (*)     Hematocrit 48.7 (*)     .0 (*)     MCHC 31.4 (*)     Neutrophil % 89.4 (*)     Lymphocyte % 6.0 (*)     Monocyte % 3.4 (*)     Neutrophils, Absolute 10.04 (*)     Lymphocytes, Absolute 0.67 (*)     All other components within normal limits   URINALYSIS, MICROSCOPIC ONLY - Abnormal; Notable for the following components:    RBC, UA 21-30 (*)     Squamous Epithelial Cells, UA 3-6 (*)     All other components within normal limits   CK - Normal   LACTIC ACID, REFLEX - Normal   LACTIC ACID REFLEX TIMER   CBC AND DIFFERENTIAL    Narrative:     The following orders were created for panel order CBC & Differential.  Procedure                               Abnormality         Status                     ---------                               -----------         ------                     CBC Auto Differential[[id number]]        Abnormal            Final result                 Please view results for these tests on the individual orders.        Medication List      ASK your doctor about these medications    albuterol sulfate  (57  Base) MCG/ACT inhaler  Commonly known as:  PROVENTIL HFA;VENTOLIN HFA;PROAIR HFA     doxycycline 100 MG capsule  Commonly known as:  MONODOX  Take 1 capsule by mouth 2 (Two) Times a Day.     losartan 50 MG tablet  Commonly known as:  COZAAR  Take 1 tablet by mouth Daily.     methylPREDNISolone 4 MG tablet  Commonly known as:  MEDROL (DAVID)  Take as directed on package instructions.     O2  Commonly known as:  OXYGEN                    MDM  Number of Diagnoses or Management Options  Status epilepticus (CMS/HCC): new and requires workup     Amount and/or Complexity of Data Reviewed  Clinical lab tests: ordered and reviewed  Tests in the radiology section of CPT®: ordered and reviewed  Obtain history from someone other than the patient: yes  Discuss the patient with other providers: yes    Risk of Complications, Morbidity, and/or Mortality  Presenting problems: high  Diagnostic procedures: high  Management options: high    Patient Progress  Patient progress: improved        Final diagnoses:   Status epilepticus (CMS/HCC)            Conrado Freeman MD  08/08/19 0748

## 2019-08-08 NOTE — ED NOTES
Bedside report given to [facility]. Pt remains on 2lpm nc and continued at time of transfer, ns infusing at 125ml/hr in 24 gauge in left wrist and continued at time of transfer.     Adri Blake RN  08/08/19 1019

## 2019-08-08 NOTE — ED NOTES
Pt postictal, pt remains on 2lpm nc, ns noted. Pt resting with eyes closed, responds to gentle shaking and verbal stimuli. Skin pwd, no resp distress, seizure precautions maintained. Ns continues to infuse at 125ml/hr in 24 gauge in left wrist with no s/s of infiltration.     Adri Blake, YOLANDA  08/08/19 1016

## 2019-08-08 NOTE — ED NOTES
I called [facility] ems and they declined transfer     Kayce Everett  08/08/19 0750

## 2019-08-08 NOTE — ED NOTES
Pt postictal, pt remains on 2lpm nc, ns noted. Pt resting with eyes closed, responds to gentle shaking and verbal stimuli. Skin pwd, no resp distress, seizure precautions maintained. poc updated.      Adri Blake RN  08/08/19 1342

## 2019-08-08 NOTE — ED NOTES
I called [facility] about [facility]  pt transfer and they advised they would have their oic call me back      Kayce Everett  08/08/19 0877

## 2019-08-08 NOTE — ED NOTES
[facility]  [facility] [place] [facility] one [place] the whole [place], I checked with [facility] again and they advised that still cant fly but may be able to within the next hour     Kayce Everett  08/08/19 0850

## 2019-08-08 NOTE — ED NOTES
[facility] [place] they [place] and to fax a pcf form     Kayce Everett  08/08/19 0878

## 2019-08-08 NOTE — ED NOTES
Pt postictal, pt remains on 2lpm nc, ns noted. Pt resting with eyes closed, responds to gentle shaking and verbal stimuli. Skin pwd, no resp distress, seizure precautions maintained. poc updated.      Adri Blake RN  08/08/19 4705

## 2019-08-08 NOTE — ED NOTES
Called  [facility] for possible transfer, they are going to page Dr. Phillips and give us a call back.      Glynn Warner  08/08/19 0631

## 2019-08-08 NOTE — ED NOTES
Contacted [facility], spoke with Crissy.  [facility] declines transport at this time, due to weather. Dr Freeman made aware. [facility] advises that they will call back if they are available to transport.     Karolyn Garcia RN  08/08/19 0702

## 2019-08-08 NOTE — ED NOTES
Agus with [facility] advised that as soon as their oic returns to station he will have them call us back about pt transfer     Kayce Everett  08/08/19 0709

## 2019-08-08 NOTE — ED NOTES
Pt postictal, pt remains on 2lpm nc, ns noted. Pt resting with eyes closed, responds to gentle shaking and verbal stimuli. Skin pwd, no resp distress, seizure precautions maintained. poc updated.      Adri Blake RN  08/08/19 1383

## 2019-08-08 NOTE — ED NOTES
Pt SpO2 noted to be 83-88% on NC 2 lpm.  Pt appears obtunded, pt wakes with verbal stimuli, gag reflex intact. Repositioned pt in bed. Dr Garcia made aware.         Karolyn Garcia RN  08/08/19 0894

## 2020-03-14 ENCOUNTER — APPOINTMENT (OUTPATIENT)
Dept: CT IMAGING | Facility: HOSPITAL | Age: 55
End: 2020-03-14

## 2020-03-14 ENCOUNTER — APPOINTMENT (OUTPATIENT)
Dept: GENERAL RADIOLOGY | Facility: HOSPITAL | Age: 55
End: 2020-03-14

## 2020-03-14 ENCOUNTER — HOSPITAL ENCOUNTER (EMERGENCY)
Facility: HOSPITAL | Age: 55
Discharge: HOME | End: 2020-03-14
Attending: EMERGENCY MEDICINE | Admitting: EMERGENCY MEDICINE

## 2020-03-14 VITALS
DIASTOLIC BLOOD PRESSURE: 90 MMHG | WEIGHT: 175.04 LBS | BODY MASS INDEX: 34.37 KG/M2 | HEART RATE: 80 BPM | OXYGEN SATURATION: 98 % | RESPIRATION RATE: 18 BRPM | SYSTOLIC BLOOD PRESSURE: 174 MMHG | TEMPERATURE: 98.2 F | HEIGHT: 60 IN

## 2020-03-14 DIAGNOSIS — G40.909 SEIZURE DISORDER (HCC): Primary | ICD-10-CM

## 2020-03-14 LAB
6-ACETYL MORPHINE: NEGATIVE
ALBUMIN SERPL-MCNC: 4.52 G/DL (ref 3.5–5.2)
ALBUMIN/GLOB SERPL: 1.1 G/DL
ALP SERPL-CCNC: 78 U/L (ref 39–117)
ALT SERPL W P-5'-P-CCNC: 18 U/L (ref 1–33)
AMPHET+METHAMPHET UR QL: NEGATIVE
ANION GAP SERPL CALCULATED.3IONS-SCNC: 11.9 MMOL/L (ref 5–15)
APAP SERPL-MCNC: <5 MCG/ML (ref 10–30)
AST SERPL-CCNC: 18 U/L (ref 1–32)
BACTERIA UR QL AUTO: ABNORMAL /HPF
BARBITURATES UR QL SCN: NEGATIVE
BASOPHILS # BLD AUTO: 0.04 10*3/MM3 (ref 0–0.2)
BASOPHILS NFR BLD AUTO: 0.3 % (ref 0–1.5)
BENZODIAZ UR QL SCN: NEGATIVE
BILIRUB SERPL-MCNC: 0.4 MG/DL (ref 0.2–1.2)
BILIRUB UR QL STRIP: NEGATIVE
BUN BLD-MCNC: 8 MG/DL (ref 6–20)
BUN/CREAT SERPL: 16.3 (ref 7–25)
BUPRENORPHINE SERPL-MCNC: NEGATIVE NG/ML
CALCIUM SPEC-SCNC: 9.8 MG/DL (ref 8.6–10.5)
CANNABINOIDS SERPL QL: NEGATIVE
CHLORIDE SERPL-SCNC: 92 MMOL/L (ref 98–107)
CLARITY UR: CLEAR
CO2 SERPL-SCNC: 35.1 MMOL/L (ref 22–29)
COCAINE UR QL: NEGATIVE
COLOR UR: YELLOW
CREAT BLD-MCNC: 0.49 MG/DL (ref 0.57–1)
DEPRECATED RDW RBC AUTO: 44.2 FL (ref 37–54)
EOSINOPHIL # BLD AUTO: 0.02 10*3/MM3 (ref 0–0.4)
EOSINOPHIL NFR BLD AUTO: 0.2 % (ref 0.3–6.2)
ERYTHROCYTE [DISTWIDTH] IN BLOOD BY AUTOMATED COUNT: 12.1 % (ref 12.3–15.4)
ETHANOL BLD-MCNC: <10 MG/DL (ref 0–10)
ETHANOL UR QL: <0.01 %
GFR SERPL CREATININE-BSD FRML MDRD: 133 ML/MIN/1.73
GLOBULIN UR ELPH-MCNC: 4.3 GM/DL
GLUCOSE BLD-MCNC: 150 MG/DL (ref 65–99)
GLUCOSE BLDC GLUCOMTR-MCNC: 136 MG/DL (ref 70–130)
GLUCOSE UR STRIP-MCNC: NEGATIVE MG/DL
HCT VFR BLD AUTO: 45.1 % (ref 34–46.6)
HGB BLD-MCNC: 13.9 G/DL (ref 12–15.9)
HGB UR QL STRIP.AUTO: NEGATIVE
HOLD SPECIMEN: NORMAL
HOLD SPECIMEN: NORMAL
HYALINE CASTS UR QL AUTO: ABNORMAL /LPF
IMM GRANULOCYTES # BLD AUTO: 0.05 10*3/MM3 (ref 0–0.05)
IMM GRANULOCYTES NFR BLD AUTO: 0.4 % (ref 0–0.5)
KETONES UR QL STRIP: NEGATIVE
LEUKOCYTE ESTERASE UR QL STRIP.AUTO: NEGATIVE
LYMPHOCYTES # BLD AUTO: 1.66 10*3/MM3 (ref 0.7–3.1)
LYMPHOCYTES NFR BLD AUTO: 14.3 % (ref 19.6–45.3)
MAGNESIUM SERPL-MCNC: 1.7 MG/DL (ref 1.6–2.6)
MCH RBC QN AUTO: 30.5 PG (ref 26.6–33)
MCHC RBC AUTO-ENTMCNC: 30.8 G/DL (ref 31.5–35.7)
MCV RBC AUTO: 98.9 FL (ref 79–97)
METHADONE UR QL SCN: NEGATIVE
MONOCYTES # BLD AUTO: 0.5 10*3/MM3 (ref 0.1–0.9)
MONOCYTES NFR BLD AUTO: 4.3 % (ref 5–12)
NEUTROPHILS # BLD AUTO: 9.32 10*3/MM3 (ref 1.7–7)
NEUTROPHILS NFR BLD AUTO: 80.5 % (ref 42.7–76)
NITRITE UR QL STRIP: NEGATIVE
NRBC BLD AUTO-RTO: 0 /100 WBC (ref 0–0.2)
OPIATES UR QL: NEGATIVE
OXYCODONE UR QL SCN: NEGATIVE
PCP UR QL SCN: NEGATIVE
PH UR STRIP.AUTO: 8 [PH] (ref 5–8)
PLATELET # BLD AUTO: 172 10*3/MM3 (ref 140–450)
PMV BLD AUTO: 11.5 FL (ref 6–12)
POTASSIUM BLD-SCNC: 3.8 MMOL/L (ref 3.5–5.2)
PROT SERPL-MCNC: 8.8 G/DL (ref 6–8.5)
PROT UR QL STRIP: ABNORMAL
RBC # BLD AUTO: 4.56 10*6/MM3 (ref 3.77–5.28)
RBC # UR: ABNORMAL /HPF
REF LAB TEST METHOD: ABNORMAL
SALICYLATES SERPL-MCNC: <0.3 MG/DL
SODIUM BLD-SCNC: 139 MMOL/L (ref 136–145)
SP GR UR STRIP: 1.01 (ref 1–1.03)
SQUAMOUS #/AREA URNS HPF: ABNORMAL /HPF
TROPONIN T SERPL-MCNC: <0.01 NG/ML (ref 0–0.03)
TROPONIN T SERPL-MCNC: <0.01 NG/ML (ref 0–0.03)
UROBILINOGEN UR QL STRIP: ABNORMAL
WBC NRBC COR # BLD: 11.59 10*3/MM3 (ref 3.4–10.8)
WBC UR QL AUTO: ABNORMAL /HPF
WHOLE BLOOD HOLD SPECIMEN: NORMAL
WHOLE BLOOD HOLD SPECIMEN: NORMAL

## 2020-03-14 PROCEDURE — 99285 EMERGENCY DEPT VISIT HI MDM: CPT

## 2020-03-14 PROCEDURE — 80307 DRUG TEST PRSMV CHEM ANLYZR: CPT | Performed by: PHYSICIAN ASSISTANT

## 2020-03-14 PROCEDURE — 96361 HYDRATE IV INFUSION ADD-ON: CPT

## 2020-03-14 PROCEDURE — 93005 ELECTROCARDIOGRAM TRACING: CPT | Performed by: PHYSICIAN ASSISTANT

## 2020-03-14 PROCEDURE — 70450 CT HEAD/BRAIN W/O DYE: CPT

## 2020-03-14 PROCEDURE — 80053 COMPREHEN METABOLIC PANEL: CPT | Performed by: PHYSICIAN ASSISTANT

## 2020-03-14 PROCEDURE — 96376 TX/PRO/DX INJ SAME DRUG ADON: CPT

## 2020-03-14 PROCEDURE — 72125 CT NECK SPINE W/O DYE: CPT

## 2020-03-14 PROCEDURE — 25010000002 LEVETRIRACETAM PER 10 MG: Performed by: EMERGENCY MEDICINE

## 2020-03-14 PROCEDURE — 71045 X-RAY EXAM CHEST 1 VIEW: CPT

## 2020-03-14 PROCEDURE — 82962 GLUCOSE BLOOD TEST: CPT

## 2020-03-14 PROCEDURE — 83735 ASSAY OF MAGNESIUM: CPT | Performed by: PHYSICIAN ASSISTANT

## 2020-03-14 PROCEDURE — 96374 THER/PROPH/DIAG INJ IV PUSH: CPT

## 2020-03-14 PROCEDURE — 85025 COMPLETE CBC W/AUTO DIFF WBC: CPT | Performed by: PHYSICIAN ASSISTANT

## 2020-03-14 PROCEDURE — 81001 URINALYSIS AUTO W/SCOPE: CPT | Performed by: PHYSICIAN ASSISTANT

## 2020-03-14 PROCEDURE — 84484 ASSAY OF TROPONIN QUANT: CPT | Performed by: PHYSICIAN ASSISTANT

## 2020-03-14 PROCEDURE — 25010000002 LORAZEPAM PER 2 MG

## 2020-03-14 PROCEDURE — 72125 CT NECK SPINE W/O DYE: CPT | Performed by: RADIOLOGY

## 2020-03-14 PROCEDURE — 71045 X-RAY EXAM CHEST 1 VIEW: CPT | Performed by: RADIOLOGY

## 2020-03-14 PROCEDURE — 70450 CT HEAD/BRAIN W/O DYE: CPT | Performed by: RADIOLOGY

## 2020-03-14 PROCEDURE — 96375 TX/PRO/DX INJ NEW DRUG ADDON: CPT

## 2020-03-14 RX ORDER — SODIUM CHLORIDE 9 MG/ML
125 INJECTION, SOLUTION INTRAVENOUS CONTINUOUS
Status: DISCONTINUED | OUTPATIENT
Start: 2020-03-14 | End: 2020-03-15 | Stop reason: HOSPADM

## 2020-03-14 RX ORDER — LABETALOL HYDROCHLORIDE 5 MG/ML
10 INJECTION, SOLUTION INTRAVENOUS ONCE
Status: COMPLETED | OUTPATIENT
Start: 2020-03-14 | End: 2020-03-14

## 2020-03-14 RX ORDER — LORAZEPAM 2 MG/ML
1 INJECTION INTRAMUSCULAR ONCE
Status: COMPLETED | OUTPATIENT
Start: 2020-03-14 | End: 2020-03-14

## 2020-03-14 RX ORDER — ROFLUMILAST 500 UG/1
TABLET ORAL DAILY
COMMUNITY

## 2020-03-14 RX ORDER — LORAZEPAM 2 MG/ML
INJECTION INTRAMUSCULAR
Status: COMPLETED
Start: 2020-03-14 | End: 2020-03-14

## 2020-03-14 RX ORDER — FUROSEMIDE 20 MG/1
20 TABLET ORAL 2 TIMES DAILY
COMMUNITY

## 2020-03-14 RX ORDER — SODIUM CHLORIDE 0.9 % (FLUSH) 0.9 %
10 SYRINGE (ML) INJECTION AS NEEDED
Status: DISCONTINUED | OUTPATIENT
Start: 2020-03-14 | End: 2020-03-15 | Stop reason: HOSPADM

## 2020-03-14 RX ORDER — LEVETIRACETAM 750 MG/1
750 TABLET ORAL 2 TIMES DAILY
COMMUNITY
End: 2020-03-17 | Stop reason: HOSPADM

## 2020-03-14 RX ADMIN — LABETALOL HYDROCHLORIDE 10 MG: 5 INJECTION INTRAVENOUS at 18:45

## 2020-03-14 RX ADMIN — LORAZEPAM 1 MG: 2 INJECTION, SOLUTION INTRAMUSCULAR; INTRAVENOUS at 15:11

## 2020-03-14 RX ADMIN — SODIUM CHLORIDE 1000 MG: 9 INJECTION, SOLUTION INTRAVENOUS at 16:12

## 2020-03-14 RX ADMIN — LORAZEPAM 1 MG: 2 INJECTION INTRAMUSCULAR at 15:11

## 2020-03-14 RX ADMIN — SODIUM CHLORIDE 125 ML/HR: 9 INJECTION, SOLUTION INTRAVENOUS at 14:29

## 2020-03-14 RX ADMIN — LABETALOL HYDROCHLORIDE 10 MG: 5 INJECTION INTRAVENOUS at 18:03

## 2020-03-14 NOTE — ED NOTES
"PT STATES \" IT HURTS EVERY WHERE YOU TOUCH HER\",     Anselmo Patrick, YOLANDA  03/14/20 1323       Anselmo Patrick RN  03/14/20 1403    "

## 2020-03-14 NOTE — ED NOTES
Called number listed, states that they are in [facility] and that they are not able to come and get the patient for a while, asked if there is someone else that can be called to come and patient, states that he will try to get ahold of someone.      Milka Koch, YOLANDA  03/14/20 1924

## 2020-03-14 NOTE — ED NOTES
Patient Placed on Standard bed pan.  Patient had a  Large bowel movement.  Patient Perineal area Cleaned.  Clean linens Applied to the Bed.     Reginald Toribio RN  03/14/20 4953

## 2020-03-14 NOTE — ED PROVIDER NOTES
Subjective   54-year-old female who presents to the ED today due to a fall.  The patient told me that she fell today at home.  She states she is having a headache.  She points to the right side of her head as the area of her head ache.  She is unable to tell me any thing else at this time.  She is very agitated and restless.  When I initially walked into the room to examine her, she repeatedly told me that she had to use the bathroom.  The patient was placed on the bedpan by nursing staff and the patient was able to have a bowel movement.  I went back into the room afterwards and the patient was not able to give me any more history.  I tried to listen to her heart and lungs and she repeatedly pushed my stethoscope off her chest.  She told me that it hurt on her left chest when I placed my stethoscope on it.  I tried to open her eyes to examine her pupils however she said it hurt too bad and would not allow me to open her eyes.      History provided by:  Patient  Fall   Mechanism of injury: fall    Injury location:  Head/neck  Head/neck injury location:  Head  Incident location:  Home  Time since incident: unknown.  Fall:     Fall occurred:  Unable to specify    Impact surface:  Unable to specify      Review of Systems   Unable to perform ROS: Mental status change       Past Medical History:   Diagnosis Date   • Arthritis    • Asthma    • CHF (congestive heart failure) (CMS/[facility])    • COPD (chronic obstructive pulmonary disease) (CMS/[facility])    • Coronary artery disease    • Diabetes mellitus (CMS/HCC)    • Hypertension    • Seizures (CMS/[facility])        Allergies   Allergen Reactions   • Amoxicillin Anaphylaxis   • Other Itching     STEROIDS   • Codeine Hives   • Latex Itching   • Rocephin [Ceftriaxone] Itching       Past Surgical History:   Procedure Laterality Date   • HYSTERECTOMY         Family History   Problem Relation Age of Onset   • Breast cancer Paternal Aunt    • Heart disease Mother    • Heart disease Father     • Heart disease Sister    • Heart disease Brother        Social History     Socioeconomic History   • Marital status:      Spouse name: Not on file   • Number of children: Not on file   • Years of education: Not on file   • Highest education level: Not on file   Tobacco Use   • Smoking status: Current Every Day Smoker     Packs/day: 0.25     Years: 15.00     Pack years: 3.75     Types: Cigarettes   • Smokeless tobacco: Never Used   Substance and Sexual Activity   • Alcohol use: No   • Drug use: Defer   • Sexual activity: Defer           Objective   Physical Exam   Constitutional: She appears well-developed and well-nourished.   HENT:   Head: Normocephalic. Head is without raccoon's eyes, without Reeves's sign, without abrasion, without contusion and without laceration.   Right Ear: External ear normal.   Left Ear: External ear normal.   Nose: Nose normal.   Cardiovascular: Normal rate, regular rhythm, normal heart sounds and intact distal pulses.   Pulmonary/Chest: Effort normal and breath sounds normal. No respiratory distress. She has no wheezes.   Abdominal: Soft. Bowel sounds are normal. There is no tenderness. There is no rebound and no guarding.   Neurological:   Patient is very restless and agitated.  She has a difficult time answering questions.   Skin: Skin is warm and dry. Capillary refill takes less than 2 seconds. She is not diaphoretic.   Nursing note and vitals reviewed.      Procedures           ED Course  ED Course as of Mar 15 2558   Sat Mar 14, 2020   1403 Care assumed from WINDY Schwartz.  Initially patient complained of headache.  Within the next few minutes she began to have repetitive nystagmus and left gaze fixation with eye blinking and some right hand tremor.  She was unresponsive at that time.  Her pupils were poorly reactive.  Could not accurately assess her corneal reflex due to frequent blinking.  This lasted approximately 2 to 3 minutes.  Immediately thereafter, she was less  verbal, but 10 to 15 minutes later she was able to answer when her name was called but would not give any history.  Therefore, unable to give any specific history.  No documented history of seizure disorder on her chart.    [BC]   1520 Patient with generalized tonic-clonic seizure.  This lasted approximately 1 minute, and resolved prior to administration of Ativan.  According to family who is now present, the patient is feels to be taking unknown seizure medicine (this appears to be Keppra based on available records), but they say they believe that she may not be taking her medicine.  It is likely that she was post ictal earlier which was the etiology of her altered mental status.  Will give loading dose of Keppra.    [BC]   1920 Currently patient more alert, answers questions appropriately.  She states she has been taking her seizure medicine.  We will plan to discharge back home and continue Keppra.    [BC]      ED Course User Index  [BC] Maxim Rosenberg MD                                           [facility]    Final diagnoses:   Seizure disorder (CMS/[facility])            Nata Sheldon, PA  03/15/20 0758

## 2020-03-14 NOTE — ED NOTES
PT HAD SZ ACTIVITY  AGAIN AND DR. WU AT BEDSIDE ATIVAN GIVEN AND DR. WU PLACED ORAL AIR WAY IN     Anselmo Patrick RN  03/14/20 8842

## 2020-03-14 NOTE — ED NOTES
PT LOOKED LIKE SHE MAYBE HAVING SZ LIKE ACTIVE DR. WU AT BEDSIDE     Anselmo Patrick, RN  03/14/20 7852

## 2020-03-15 ENCOUNTER — HOSPITAL ENCOUNTER (EMERGENCY)
Facility: HOSPITAL | Age: 55
Discharge: ANOTHER HOSPITAL | End: 2020-03-15
Attending: FAMILY MEDICINE | Admitting: FAMILY MEDICINE

## 2020-03-15 ENCOUNTER — APPOINTMENT (OUTPATIENT)
Dept: CT IMAGING | Facility: HOSPITAL | Age: 55
End: 2020-03-15

## 2020-03-15 ENCOUNTER — APPOINTMENT (OUTPATIENT)
Dept: NEUROLOGY | Facility: HOSPITAL | Age: 55
End: 2020-03-15

## 2020-03-15 ENCOUNTER — APPOINTMENT (OUTPATIENT)
Dept: MRI IMAGING | Facility: HOSPITAL | Age: 55
End: 2020-03-15

## 2020-03-15 ENCOUNTER — HOSPITAL ENCOUNTER (OUTPATIENT)
Facility: HOSPITAL | Age: 55
Setting detail: OBSERVATION
Discharge: HOME | End: 2020-03-17
Attending: FAMILY MEDICINE | Admitting: INTERNAL MEDICINE

## 2020-03-15 ENCOUNTER — APPOINTMENT (OUTPATIENT)
Dept: GENERAL RADIOLOGY | Facility: HOSPITAL | Age: 55
End: 2020-03-15

## 2020-03-15 VITALS
HEART RATE: 86 BPM | WEIGHT: 180 LBS | TEMPERATURE: 97.8 F | OXYGEN SATURATION: 90 % | BODY MASS INDEX: 35.34 KG/M2 | SYSTOLIC BLOOD PRESSURE: 153 MMHG | HEIGHT: 60 IN | DIASTOLIC BLOOD PRESSURE: 79 MMHG | RESPIRATION RATE: 18 BRPM

## 2020-03-15 DIAGNOSIS — Z74.09 IMPAIRED MOBILITY AND ADLS: Primary | ICD-10-CM

## 2020-03-15 DIAGNOSIS — Z78.9 IMPAIRED MOBILITY AND ADLS: Primary | ICD-10-CM

## 2020-03-15 DIAGNOSIS — R41.82 ALTERED MENTAL STATUS, UNSPECIFIED ALTERED MENTAL STATUS TYPE: Primary | ICD-10-CM

## 2020-03-15 DIAGNOSIS — G40.909 SEIZURE DISORDER (HCC): ICD-10-CM

## 2020-03-15 PROBLEM — I27.20 PULMONARY HYPERTENSION: Chronic | Status: ACTIVE | Noted: 2020-03-15

## 2020-03-15 PROBLEM — E66.9 MODERATE OBESITY: Chronic | Status: ACTIVE | Noted: 2018-08-13

## 2020-03-15 PROBLEM — I10 HYPERTENSION: Chronic | Status: ACTIVE | Noted: 2018-04-02

## 2020-03-15 PROBLEM — F17.200 SMOKER: Status: ACTIVE | Noted: 2020-03-15

## 2020-03-15 PROBLEM — I27.20 PULMONARY HYPERTENSION (HCC): Status: ACTIVE | Noted: 2020-03-15

## 2020-03-15 PROBLEM — R56.9 UNCONTROLLED SEIZURES: Status: ACTIVE | Noted: 2020-03-15

## 2020-03-15 PROBLEM — E66.8 MODERATE OBESITY: Chronic | Status: ACTIVE | Noted: 2018-08-13

## 2020-03-15 PROBLEM — J44.9 COPD (CHRONIC OBSTRUCTIVE PULMONARY DISEASE) (HCC): Chronic | Status: ACTIVE | Noted: 2018-04-02

## 2020-03-15 PROBLEM — I51.9 MILD DIASTOLIC DYSFUNCTION: Chronic | Status: ACTIVE | Noted: 2020-03-15

## 2020-03-15 PROBLEM — F17.200 SMOKER: Chronic | Status: ACTIVE | Noted: 2020-03-15

## 2020-03-15 LAB
6-ACETYL MORPHINE: NEGATIVE
A-A DO2: 69.5 MMHG (ref 0–300)
A-A DO2: 83.3 MMHG (ref 0–300)
ALBUMIN SERPL-MCNC: 4.12 G/DL (ref 3.5–5.2)
ALBUMIN/GLOB SERPL: 1 G/DL
ALP SERPL-CCNC: 72 U/L (ref 39–117)
ALT SERPL W P-5'-P-CCNC: 15 U/L (ref 1–33)
AMPHET+METHAMPHET UR QL: NEGATIVE
ANION GAP SERPL CALCULATED.3IONS-SCNC: 10.7 MMOL/L (ref 5–15)
ARTERIAL PATENCY WRIST A: POSITIVE
ARTERIAL PATENCY WRIST A: POSITIVE
AST SERPL-CCNC: 15 U/L (ref 1–32)
ATMOSPHERIC PRESS: 735 MMHG
ATMOSPHERIC PRESS: 736 MMHG
B PERT DNA SPEC QL NAA+PROBE: NOT DETECTED
BACTERIA UR QL AUTO: ABNORMAL /HPF
BARBITURATES UR QL SCN: NEGATIVE
BASE EXCESS BLDA CALC-SCNC: 11.7 MMOL/L (ref 0–2)
BASE EXCESS BLDA CALC-SCNC: 11.9 MMOL/L (ref 0–2)
BASOPHILS # BLD AUTO: 0.02 10*3/MM3 (ref 0–0.2)
BASOPHILS NFR BLD AUTO: 0.2 % (ref 0–1.5)
BDY SITE: ABNORMAL
BDY SITE: ABNORMAL
BENZODIAZ UR QL SCN: NEGATIVE
BILIRUB SERPL-MCNC: 0.5 MG/DL (ref 0.2–1.2)
BILIRUB UR QL STRIP: NEGATIVE
BODY TEMPERATURE: 0 C
BODY TEMPERATURE: 0 C
BUN BLD-MCNC: 9 MG/DL (ref 6–20)
BUN/CREAT SERPL: 16.7 (ref 7–25)
BUPRENORPHINE SERPL-MCNC: NEGATIVE NG/ML
C PNEUM DNA NPH QL NAA+NON-PROBE: NOT DETECTED
CALCIUM SPEC-SCNC: 10 MG/DL (ref 8.6–10.5)
CANNABINOIDS SERPL QL: NEGATIVE
CHLORIDE SERPL-SCNC: 94 MMOL/L (ref 98–107)
CK SERPL-CCNC: 37 U/L (ref 20–180)
CLARITY UR: CLEAR
CO2 BLDA-SCNC: 40.4 MMOL/L (ref 22–33)
CO2 BLDA-SCNC: 41 MMOL/L (ref 22–33)
CO2 SERPL-SCNC: 35.3 MMOL/L (ref 22–29)
COCAINE UR QL: NEGATIVE
COHGB MFR BLD: 0.6 % (ref 0–5)
COHGB MFR BLD: <0.2 % (ref 0–5)
COLOR UR: YELLOW
CREAT BLD-MCNC: 0.54 MG/DL (ref 0.57–1)
CRP SERPL-MCNC: 0.22 MG/DL (ref 0–0.5)
D-LACTATE SERPL-SCNC: 1.8 MMOL/L (ref 0.5–2)
DEPRECATED RDW RBC AUTO: 42.6 FL (ref 37–54)
EOSINOPHIL # BLD AUTO: 0.01 10*3/MM3 (ref 0–0.4)
EOSINOPHIL NFR BLD AUTO: 0.1 % (ref 0.3–6.2)
ERYTHROCYTE [DISTWIDTH] IN BLOOD BY AUTOMATED COUNT: 12 % (ref 12.3–15.4)
ETHANOL BLD-MCNC: <10 MG/DL (ref 0–10)
ETHANOL UR QL: <0.01 %
FLUAV AG NPH QL: NEGATIVE
FLUAV H1 2009 PAND RNA NPH QL NAA+PROBE: NOT DETECTED
FLUAV H1 HA GENE NPH QL NAA+PROBE: NOT DETECTED
FLUAV H3 RNA NPH QL NAA+PROBE: NOT DETECTED
FLUAV SUBTYP SPEC NAA+PROBE: NOT DETECTED
FLUBV AG NPH QL IA: NEGATIVE
FLUBV RNA ISLT QL NAA+PROBE: NOT DETECTED
GAS FLOW AIRWAY: 2 LPM
GAS FLOW AIRWAY: 3 LPM
GFR SERPL CREATININE-BSD FRML MDRD: 119 ML/MIN/1.73
GLOBULIN UR ELPH-MCNC: 4.1 GM/DL
GLUCOSE BLD-MCNC: 119 MG/DL (ref 65–99)
GLUCOSE UR STRIP-MCNC: NEGATIVE MG/DL
HADV DNA SPEC NAA+PROBE: NOT DETECTED
HBA1C MFR BLD: 5.6 % (ref 4.8–5.6)
HCO3 BLDA-SCNC: 38.6 MMOL/L (ref 20–26)
HCO3 BLDA-SCNC: 39.1 MMOL/L (ref 20–26)
HCOV 229E RNA SPEC QL NAA+PROBE: NOT DETECTED
HCOV HKU1 RNA SPEC QL NAA+PROBE: NOT DETECTED
HCOV NL63 RNA SPEC QL NAA+PROBE: NOT DETECTED
HCOV OC43 RNA SPEC QL NAA+PROBE: NOT DETECTED
HCT VFR BLD AUTO: 42.3 % (ref 34–46.6)
HCT VFR BLD CALC: 40.2 % (ref 38–51)
HCT VFR BLD CALC: 40.4 % (ref 38–51)
HGB BLD-MCNC: 13.3 G/DL (ref 12–15.9)
HGB BLDA-MCNC: 13.1 G/DL (ref 13.5–17.5)
HGB BLDA-MCNC: 13.2 G/DL (ref 13.5–17.5)
HGB UR QL STRIP.AUTO: ABNORMAL
HMPV RNA NPH QL NAA+NON-PROBE: NOT DETECTED
HOLD SPECIMEN: NORMAL
HOLD SPECIMEN: NORMAL
HOROWITZ INDEX BLD+IHG-RTO: 28 %
HOROWITZ INDEX BLD+IHG-RTO: 32 %
HPIV1 RNA SPEC QL NAA+PROBE: NOT DETECTED
HPIV2 RNA SPEC QL NAA+PROBE: NOT DETECTED
HPIV3 RNA NPH QL NAA+PROBE: NOT DETECTED
HPIV4 P GENE NPH QL NAA+PROBE: NOT DETECTED
HYALINE CASTS UR QL AUTO: ABNORMAL /LPF
IMM GRANULOCYTES # BLD AUTO: 0.06 10*3/MM3 (ref 0–0.05)
IMM GRANULOCYTES NFR BLD AUTO: 0.5 % (ref 0–0.5)
KETONES UR QL STRIP: NEGATIVE
LEUKOCYTE ESTERASE UR QL STRIP.AUTO: NEGATIVE
LYMPHOCYTES # BLD AUTO: 1.47 10*3/MM3 (ref 0.7–3.1)
LYMPHOCYTES NFR BLD AUTO: 11.9 % (ref 19.6–45.3)
Lab: ABNORMAL
Lab: ABNORMAL
M PNEUMO IGG SER IA-ACNC: NOT DETECTED
MCH RBC QN AUTO: 30.2 PG (ref 26.6–33)
MCHC RBC AUTO-ENTMCNC: 31.4 G/DL (ref 31.5–35.7)
MCV RBC AUTO: 96.1 FL (ref 79–97)
METHADONE UR QL SCN: NEGATIVE
METHGB BLD QL: 0.3 % (ref 0–3)
METHGB BLD QL: 0.8 % (ref 0–3)
MODALITY: ABNORMAL
MODALITY: ABNORMAL
MONOCYTES # BLD AUTO: 0.59 10*3/MM3 (ref 0.1–0.9)
MONOCYTES NFR BLD AUTO: 4.8 % (ref 5–12)
NEUTROPHILS # BLD AUTO: 10.25 10*3/MM3 (ref 1.7–7)
NEUTROPHILS NFR BLD AUTO: 82.5 % (ref 42.7–76)
NITRITE UR QL STRIP: NEGATIVE
NOTE: ABNORMAL
NOTE: ABNORMAL
NRBC BLD AUTO-RTO: 0 /100 WBC (ref 0–0.2)
OPIATES UR QL: NEGATIVE
OXYCODONE UR QL SCN: NEGATIVE
OXYHGB MFR BLDV: 88.8 % (ref 94–99)
OXYHGB MFR BLDV: 91.7 % (ref 94–99)
PCO2 BLDA: 58.1 MM HG (ref 35–45)
PCO2 BLDA: 62.8 MM HG (ref 35–45)
PCO2 TEMP ADJ BLD: ABNORMAL MM[HG]
PCO2 TEMP ADJ BLD: ABNORMAL MM[HG]
PCP UR QL SCN: NEGATIVE
PH BLDA: 7.4 PH UNITS (ref 7.35–7.45)
PH BLDA: 7.43 PH UNITS (ref 7.35–7.45)
PH UR STRIP.AUTO: 6.5 [PH] (ref 5–8)
PH, TEMP CORRECTED: ABNORMAL
PH, TEMP CORRECTED: ABNORMAL
PLATELET # BLD AUTO: 175 10*3/MM3 (ref 140–450)
PMV BLD AUTO: 11.7 FL (ref 6–12)
PO2 BLDA: 58.3 MM HG (ref 83–108)
PO2 BLDA: 67.4 MM HG (ref 83–108)
PO2 TEMP ADJ BLD: ABNORMAL MM[HG]
PO2 TEMP ADJ BLD: ABNORMAL MM[HG]
POTASSIUM BLD-SCNC: 3.9 MMOL/L (ref 3.5–5.2)
PROT SERPL-MCNC: 8.2 G/DL (ref 6–8.5)
PROT UR QL STRIP: ABNORMAL
RBC # BLD AUTO: 4.4 10*6/MM3 (ref 3.77–5.28)
RBC # UR: ABNORMAL /HPF
REF LAB TEST METHOD: ABNORMAL
RHINOVIRUS RNA SPEC NAA+PROBE: NOT DETECTED
RSV RNA NPH QL NAA+NON-PROBE: NOT DETECTED
SAO2 % BLDCOA: 88.7 % (ref 94–99)
SAO2 % BLDCOA: 93 % (ref 94–99)
SODIUM BLD-SCNC: 140 MMOL/L (ref 136–145)
SP GR UR STRIP: 1.01 (ref 1–1.03)
SQUAMOUS #/AREA URNS HPF: ABNORMAL /HPF
TROPONIN T SERPL-MCNC: <0.01 NG/ML (ref 0–0.03)
UROBILINOGEN UR QL STRIP: ABNORMAL
VENTILATOR MODE: ABNORMAL
VENTILATOR MODE: ABNORMAL
WBC NRBC COR # BLD: 12.4 10*3/MM3 (ref 3.4–10.8)
WBC UR QL AUTO: ABNORMAL /HPF
WHOLE BLOOD HOLD SPECIMEN: NORMAL
WHOLE BLOOD HOLD SPECIMEN: NORMAL

## 2020-03-15 PROCEDURE — 99285 EMERGENCY DEPT VISIT HI MDM: CPT

## 2020-03-15 PROCEDURE — G0378 HOSPITAL OBSERVATION PER HR: HCPCS

## 2020-03-15 PROCEDURE — 85025 COMPLETE CBC W/AUTO DIFF WBC: CPT | Performed by: NURSE PRACTITIONER

## 2020-03-15 PROCEDURE — 80307 DRUG TEST PRSMV CHEM ANLYZR: CPT | Performed by: NURSE PRACTITIONER

## 2020-03-15 PROCEDURE — 87040 BLOOD CULTURE FOR BACTERIA: CPT | Performed by: NURSE PRACTITIONER

## 2020-03-15 PROCEDURE — 25010000002 ENOXAPARIN PER 10 MG: Performed by: FAMILY MEDICINE

## 2020-03-15 PROCEDURE — 87804 INFLUENZA ASSAY W/OPTIC: CPT | Performed by: NURSE PRACTITIONER

## 2020-03-15 PROCEDURE — 25010000002 LEVETRIRACETAM PER 10 MG: Performed by: NURSE PRACTITIONER

## 2020-03-15 PROCEDURE — 70553 MRI BRAIN STEM W/O & W/DYE: CPT

## 2020-03-15 PROCEDURE — 95819 EEG AWAKE AND ASLEEP: CPT

## 2020-03-15 PROCEDURE — 93010 ELECTROCARDIOGRAM REPORT: CPT | Performed by: INTERNAL MEDICINE

## 2020-03-15 PROCEDURE — 86140 C-REACTIVE PROTEIN: CPT | Performed by: NURSE PRACTITIONER

## 2020-03-15 PROCEDURE — 0 GADOBENATE DIMEGLUMINE 529 MG/ML SOLUTION: Performed by: FAMILY MEDICINE

## 2020-03-15 PROCEDURE — 80053 COMPREHEN METABOLIC PANEL: CPT | Performed by: NURSE PRACTITIONER

## 2020-03-15 PROCEDURE — 84484 ASSAY OF TROPONIN QUANT: CPT | Performed by: NURSE PRACTITIONER

## 2020-03-15 PROCEDURE — 36600 WITHDRAWAL OF ARTERIAL BLOOD: CPT

## 2020-03-15 PROCEDURE — 25010000003 LEVETIRACETAM IN NACL 0.54% 1500 MG/100ML SOLUTION: Performed by: PSYCHIATRY & NEUROLOGY

## 2020-03-15 PROCEDURE — 71045 X-RAY EXAM CHEST 1 VIEW: CPT

## 2020-03-15 PROCEDURE — 70450 CT HEAD/BRAIN W/O DYE: CPT

## 2020-03-15 PROCEDURE — 99255 IP/OBS CONSLTJ NEW/EST HI 80: CPT | Performed by: PSYCHIATRY & NEUROLOGY

## 2020-03-15 PROCEDURE — 80177 DRUG SCRN QUAN LEVETIRACETAM: CPT | Performed by: FAMILY MEDICINE

## 2020-03-15 PROCEDURE — 83605 ASSAY OF LACTIC ACID: CPT | Performed by: NURSE PRACTITIONER

## 2020-03-15 PROCEDURE — A9577 INJ MULTIHANCE: HCPCS | Performed by: FAMILY MEDICINE

## 2020-03-15 PROCEDURE — 93005 ELECTROCARDIOGRAM TRACING: CPT | Performed by: NURSE PRACTITIONER

## 2020-03-15 PROCEDURE — 81001 URINALYSIS AUTO W/SCOPE: CPT | Performed by: NURSE PRACTITIONER

## 2020-03-15 PROCEDURE — 0099U HC BIOFIRE FILMARRAY RESP PANEL 1: CPT | Performed by: NURSE PRACTITIONER

## 2020-03-15 PROCEDURE — 96374 THER/PROPH/DIAG INJ IV PUSH: CPT

## 2020-03-15 PROCEDURE — 83050 HGB METHEMOGLOBIN QUAN: CPT

## 2020-03-15 PROCEDURE — 83036 HEMOGLOBIN GLYCOSYLATED A1C: CPT | Performed by: FAMILY MEDICINE

## 2020-03-15 PROCEDURE — 82805 BLOOD GASES W/O2 SATURATION: CPT

## 2020-03-15 PROCEDURE — 82375 ASSAY CARBOXYHB QUANT: CPT

## 2020-03-15 PROCEDURE — 96372 THER/PROPH/DIAG INJ SC/IM: CPT

## 2020-03-15 PROCEDURE — 36415 COLL VENOUS BLD VENIPUNCTURE: CPT

## 2020-03-15 PROCEDURE — 82550 ASSAY OF CK (CPK): CPT | Performed by: FAMILY MEDICINE

## 2020-03-15 PROCEDURE — 99220 PR INITIAL OBSERVATION CARE/DAY 70 MINUTES: CPT | Performed by: FAMILY MEDICINE

## 2020-03-15 PROCEDURE — 63710000001 DIPHENHYDRAMINE PER 50 MG: Performed by: FAMILY MEDICINE

## 2020-03-15 RX ORDER — LORAZEPAM 2 MG/ML
1 INJECTION INTRAMUSCULAR EVERY 4 HOURS PRN
Status: DISCONTINUED | OUTPATIENT
Start: 2020-03-15 | End: 2020-03-17 | Stop reason: HOSPADM

## 2020-03-15 RX ORDER — SODIUM CHLORIDE 0.9 % (FLUSH) 0.9 %
10 SYRINGE (ML) INJECTION AS NEEDED
Status: DISCONTINUED | OUTPATIENT
Start: 2020-03-15 | End: 2020-03-15 | Stop reason: HOSPADM

## 2020-03-15 RX ORDER — ACETAMINOPHEN 650 MG/1
650 SUPPOSITORY RECTAL ONCE
Status: COMPLETED | OUTPATIENT
Start: 2020-03-15 | End: 2020-03-15

## 2020-03-15 RX ORDER — LEVETIRACETAM 15 MG/ML
1500 INJECTION INTRAVASCULAR EVERY 12 HOURS SCHEDULED
Status: DISCONTINUED | OUTPATIENT
Start: 2020-03-15 | End: 2020-03-17 | Stop reason: HOSPADM

## 2020-03-15 RX ORDER — NICOTINE 21 MG/24HR
1 PATCH, TRANSDERMAL 24 HOURS TRANSDERMAL
Status: DISCONTINUED | OUTPATIENT
Start: 2020-03-15 | End: 2020-03-17 | Stop reason: HOSPADM

## 2020-03-15 RX ORDER — DIPHENHYDRAMINE HCL 25 MG
25 CAPSULE ORAL NIGHTLY PRN
Status: DISCONTINUED | OUTPATIENT
Start: 2020-03-15 | End: 2020-03-17 | Stop reason: HOSPADM

## 2020-03-15 RX ORDER — SODIUM CHLORIDE 0.9 % (FLUSH) 0.9 %
10 SYRINGE (ML) INJECTION AS NEEDED
Status: DISCONTINUED | OUTPATIENT
Start: 2020-03-15 | End: 2020-03-17 | Stop reason: HOSPADM

## 2020-03-15 RX ORDER — ACETAMINOPHEN 325 MG/1
650 TABLET ORAL EVERY 4 HOURS PRN
Status: DISCONTINUED | OUTPATIENT
Start: 2020-03-15 | End: 2020-03-17 | Stop reason: HOSPADM

## 2020-03-15 RX ORDER — SODIUM CHLORIDE 0.9 % (FLUSH) 0.9 %
10 SYRINGE (ML) INJECTION EVERY 12 HOURS SCHEDULED
Status: DISCONTINUED | OUTPATIENT
Start: 2020-03-15 | End: 2020-03-17 | Stop reason: HOSPADM

## 2020-03-15 RX ORDER — ROFLUMILAST 500 UG/1
500 TABLET ORAL DAILY
Status: DISCONTINUED | OUTPATIENT
Start: 2020-03-15 | End: 2020-03-17 | Stop reason: HOSPADM

## 2020-03-15 RX ORDER — BISACODYL 5 MG/1
5 TABLET, DELAYED RELEASE ORAL DAILY PRN
Status: DISCONTINUED | OUTPATIENT
Start: 2020-03-15 | End: 2020-03-17 | Stop reason: HOSPADM

## 2020-03-15 RX ORDER — CALCIUM CARBONATE 750 MG/1
750 TABLET, CHEWABLE ORAL 2 TIMES DAILY PRN
Status: DISCONTINUED | OUTPATIENT
Start: 2020-03-15 | End: 2020-03-17 | Stop reason: HOSPADM

## 2020-03-15 RX ORDER — IPRATROPIUM BROMIDE AND ALBUTEROL SULFATE 2.5; .5 MG/3ML; MG/3ML
3 SOLUTION RESPIRATORY (INHALATION) EVERY 6 HOURS PRN
Status: DISCONTINUED | OUTPATIENT
Start: 2020-03-15 | End: 2020-03-17 | Stop reason: HOSPADM

## 2020-03-15 RX ORDER — ONDANSETRON 2 MG/ML
4 INJECTION INTRAMUSCULAR; INTRAVENOUS EVERY 6 HOURS PRN
Status: DISCONTINUED | OUTPATIENT
Start: 2020-03-15 | End: 2020-03-17 | Stop reason: HOSPADM

## 2020-03-15 RX ADMIN — GADOBENATE DIMEGLUMINE 15 ML: 529 INJECTION, SOLUTION INTRAVENOUS at 17:30

## 2020-03-15 RX ADMIN — DIPHENHYDRAMINE HYDROCHLORIDE 25 MG: 25 CAPSULE ORAL at 21:12

## 2020-03-15 RX ADMIN — SODIUM CHLORIDE, PRESERVATIVE FREE 10 ML: 5 INJECTION INTRAVENOUS at 21:12

## 2020-03-15 RX ADMIN — LEVETIRACETAM INJECTION 1500 MG: 15 INJECTION INTRAVENOUS at 21:11

## 2020-03-15 RX ADMIN — ACETAMINOPHEN 650 MG: 325 TABLET, FILM COATED ORAL at 21:12

## 2020-03-15 RX ADMIN — SODIUM CHLORIDE 1000 MG: 9 INJECTION, SOLUTION INTRAVENOUS at 04:16

## 2020-03-15 RX ADMIN — ROFLUMILAST 500 MCG: 500 TABLET ORAL at 13:19

## 2020-03-15 RX ADMIN — ENOXAPARIN SODIUM 40 MG: 40 INJECTION SUBCUTANEOUS at 13:19

## 2020-03-15 RX ADMIN — ACETAMINOPHEN 650 MG: 650 SUPPOSITORY RECTAL at 04:16

## 2020-03-15 NOTE — ED NOTES
Called [facility] to transport pt to [facility]. [facility] will call back.      Claire De La Rosa  03/15/20 0877

## 2020-03-15 NOTE — ED NOTES
ATTEMPTED TO CALL FAMILY MEMBER ZULY WISE NO ANSWER WENT TO VOICE MAIL     Anselmo Patrick RN  03/15/20 0993

## 2020-03-15 NOTE — ED NOTES
Patient noted to be very jumpy when attempting to obtaining temperature. Pt refused to cooperate. Patient noted to be lethargic. Provider aware.     Alnoso Devi, RN  03/15/20 0342

## 2020-03-15 NOTE — CONSULTS
Inpatient Neurology Consult General  Consult performed by: Jaime Ellison MD  Consult ordered by: Callie Lord MD  Reason for consult: seizures          Patient Care Team:  Afia Lopez APRN as PCP - General (Family Medicine)    Chief complaint: seizures      Subjective     History of Present Illness    54 y.o. female referred by Dr Lord for seizures.  Pt presented to [facility] [place] ED 3/14/20 after a fall and HA.  Pt had episode in ED of left gaze fixation, eye blinking and right hand tremor with decrease responsiveness.  Spell lasted for 2 - 3 minutes.  Post ictal for 10 - 15 minutes.  Then had GTC Sz for one minutes.  Treated with ativan.  PMH of sz on Keppra.  Loaded with Keppra IV.  Discharged home.    3/15/20 presented to [facility] [place] ED with 2 - 3 sz today without return to baseline.      HCT, my review of films, 3/14/20; 3/15/20, NCS  UDS - negative   U/A - neg  ABG 7.402/62.8/67.4    Pt awake but confused to details of today.  Reviewed past records multiple admissions for uncontrolled sz.     Review of Systems   Constitutional: Negative for activity change, fatigue and unexpected weight change.   HENT: Negative for tinnitus and trouble swallowing.    Eyes: Negative for photophobia and visual disturbance.   Respiratory: Negative for apnea, cough and choking.    Cardiovascular: Negative for leg swelling.   Gastrointestinal: Negative for nausea and vomiting.   Endocrine: Negative for cold intolerance and heat intolerance.   Genitourinary: Negative for difficulty urinating, frequency, menstrual problem and urgency.   Musculoskeletal: Negative for back pain, gait problem, myalgias and neck pain.   Skin: Negative for color change and rash.   Allergic/Immunologic: Negative for immunocompromised state.   Neurological: Positive for seizures and headaches. Negative for dizziness, tremors, syncope, facial asymmetry, speech difficulty, weakness, light-headedness and numbness.   Hematological: Negative for adenopathy. Does not  bruise/bleed easily.   Psychiatric/Behavioral: Positive for confusion and decreased concentration. Negative for behavioral problems, hallucinations and sleep disturbance.        Past Medical History:   Diagnosis Date   • Arthritis    • Asthma    • CHF (congestive heart failure) (CMS/[facility])    • COPD (chronic obstructive pulmonary disease) (CMS/[facility])    • Coronary artery disease    • Diabetes mellitus (CMS/[facility])    • Hypertension    • Seizures (CMS/[facility])    ,   Past Surgical History:   Procedure Laterality Date   • HYSTERECTOMY     ,   Family History   Problem Relation Age of Onset   • Breast cancer Paternal Aunt    • Heart disease Mother    • Heart disease Father    • Heart disease Sister    • Heart disease Brother    ,   Social History     Tobacco Use   • Smoking status: Current Every Day Smoker     Packs/day: 0.25     Years: 15.00     Pack years: 3.75     Types: Cigarettes   • Smokeless tobacco: Never Used   Substance Use Topics   • Alcohol use: No   • Drug use: Defer   ,   Medications Prior to Admission   Medication Sig Dispense Refill Last Dose   • albuterol (PROVENTIL HFA;VENTOLIN HFA) 108 (90 Base) MCG/ACT inhaler Inhale 2 puffs Every 4 (Four) Hours As Needed for Wheezing.   Taking   • furosemide (LASIX) 20 MG tablet Take 20 mg by mouth 2 (Two) Times a Day.      • levETIRAcetam (KEPPRA) 750 MG tablet Take 750 mg by mouth 2 (Two) Times a Day.      • O2 (OXYGEN) Inhale Every Night.   Taking   • roflumilast (DALIRESP) 500 MCG tablet tablet Take  by mouth Daily.      , Scheduled Meds:  , Continuous Infusions:    No current facility-administered medications for this encounter. , PRN Meds:   and Allergies:  Amoxicillin; Other; Codeine; Latex; and Rocephin [ceftriaxone]    Objective      Vital Signs  Temp:  [97.8 °F (36.6 °C)-99.6 °F (37.6 °C)] 98.5 °F (36.9 °C)  Heart Rate:  [] 92  Resp:  [18] 18  BP: (104-243)/() 157/85    Neurologic Exam     Mental Status   Oriented to person.   Disoriented to place.  Oriented to city.   Disoriented to time.   Follows 2 step commands.   Attention: decreased. Concentration: decreased.   Speech: speech is normal   Level of consciousness: alert  Knowledge: poor.   Able to name object. Able to repeat. Abnormal comprehension.     Cranial Nerves     CN II   Visual fields full to confrontation.   Visual acuity: normal  Right visual field deficit: none  Left visual field deficit: none     CN III, IV, VI   Pupils are equal, round, and reactive to light.  Extraocular motions are normal.   Right pupil: Shape: regular. Reactivity: brisk. Consensual response: intact.   Left pupil: Shape: regular. Reactivity: brisk. Consensual response: intact.   Nystagmus: none   Diplopia: none  Ophthalmoparesis: none  Upgaze: normal  Downgaze: normal  Conjugate gaze: present  Vestibulo-ocular reflex: present    CN V   Facial sensation intact.   Right corneal reflex: normal  Left corneal reflex: normal    CN VII   Right facial weakness: none  Left facial weakness: none    CN VIII   Hearing: intact    CN IX, X   Palate: symmetric  Right gag reflex: normal  Left gag reflex: normal    CN XI   Right sternocleidomastoid strength: normal  Left sternocleidomastoid strength: normal    CN XII   Tongue: not atrophic  Fasciculations: absent  Tongue deviation: none    Motor Exam   Muscle bulk: normal  Overall muscle tone: normal  Right arm tone: normal  Left arm tone: normal  Right leg tone: normal  Left leg tone: normal    Strength   Strength 5/5 throughout.     Sensory Exam   Light touch normal.   Vibration normal.   Proprioception normal.   Pinprick normal.     Gait, Coordination, and Reflexes     Gait  Gait: normal    Coordination   Romberg: negative  Finger to nose coordination: normal  Heel to shin coordination: normal  Tandem walking coordination: normal    Tremor   Resting tremor: absent  Intention tremor: absent  Action tremor: absent    Reflexes   Reflexes 2+ except as noted.         Physical Exam    Constitutional: Vital signs are normal. She appears well-developed and well-nourished.   HENT:   Head: Normocephalic and atraumatic.   Eyes: Pupils are equal, round, and reactive to light. EOM and lids are normal.   Fundoscopic exam:       The right eye shows no exudate, no hemorrhage and no papilledema. The right eye shows venous pulsations.        The left eye shows no exudate, no hemorrhage and no papilledema. The left eye shows venous pulsations.   Neck: Normal range of motion and phonation normal. Normal carotid pulses present. Carotid bruit is not present. No thyroid mass and no thyromegaly present.   Cardiovascular: Normal rate, regular rhythm and normal heart sounds.   Pulmonary/Chest: Effort normal.   Neurological: She has normal strength. She has a normal Finger-Nose-Finger Test, a normal Heel to Shin Test, a normal Romberg Test and a normal Tandem Gait Test. Gait normal.   Skin: Skin is warm and dry.   Psychiatric: She has a normal mood and affect. Her speech is normal.   Nursing note and vitals reviewed.      Results Review:    I reviewed the patient's new clinical results.  I reviewed the patient's new imaging results and agree with the interpretation.  I reviewed the patient's other test results and agree with the interpretation  Discussed with Dr Lord         Assessment/Plan       Uncontrolled seizures (CMS/HCC)    COPD (chronic obstructive pulmonary disease) (CMS/HCC)    Hypertension    Moderate obesity    Seizure disorder (CMS/HCC)    Pulmonary hypertension (CMS/HCC)    Mild diastolic dysfunction     1.  Seizure - pt appears post ictal.  EEG, MRI Brain, increase Keppra 1500 mg BID.  Multiple admissions for breakthrough sz in past year.        Jaime Ellison MD  03/15/20  12:00

## 2020-03-15 NOTE — ED NOTES
Called [facility] to speak with the bed board to ask if the pt bed was ready.  Not ready at this time. Will call when it is.      Claire De La Rosa  03/15/20 2580

## 2020-03-15 NOTE — PLAN OF CARE
Problem: Patient Care Overview  Goal: Plan of Care Review  Flowsheets  Taken 3/15/2020 1519  Progress: no change  Outcome Summary: Patient very lethargic and answers minimal questions from nurses.  Patient does open eyes when spoken to.  No complaints of pain.  Taken 3/15/2020 1147  Plan of Care Reviewed With: patient

## 2020-03-15 NOTE — ED NOTES
[facility] access center has called for Dr. Lord to speak with Heriberto TEMPLE.      Claire De La Rosa  03/15/20 0893

## 2020-03-15 NOTE — ED NOTES
Contacted [facility] and [facility]. No neurology coverage available.      Milka Hinson  03/15/20 0695

## 2020-03-15 NOTE — ED PROVIDER NOTES
Subjective   The patient is a 54 year old female that presents to ED for complaint of confusion. The patient has a history of seizures and has had at least 2-3 today without return to baseline mental status.She was seen here earlier this date and discharge after return to baseline. Family called EMS to return to ED because after another seizure her confusion was worse. She takes Keppra at home for these seizures but they are normally controlled well.       History provided by:  Patient   used: No    Altered Mental Status   Presenting symptoms: behavior changes and confusion    Severity:  Moderate  Most recent episode:  Yesterday  Timing:  Intermittent  Progression:  Waxing and waning  Chronicity:  New  Associated symptoms: abnormal movement, fever, seizures and slurred speech    Associated symptoms: no abdominal pain        Review of Systems   Reason unable to perform ROS: limited related to AMS.   Constitutional: Positive for fatigue and fever.   Cardiovascular: Negative.    Gastrointestinal: Negative for abdominal pain.   Musculoskeletal: Negative.    Skin: Negative.    Neurological: Positive for seizures and speech difficulty.   Psychiatric/Behavioral: Positive for confusion.   All other systems reviewed and are negative.      Past Medical History:   Diagnosis Date   • Arthritis    • Asthma    • CHF (congestive heart failure) (CMS/[facility])    • COPD (chronic obstructive pulmonary disease) (CMS/[facility])    • Coronary artery disease    • Diabetes mellitus (CMS/[facility])    • Hypertension    • Seizures (CMS/[facility])        Allergies   Allergen Reactions   • Amoxicillin Anaphylaxis   • Other Itching     STEROIDS   • Codeine Hives   • Latex Itching   • Rocephin [Ceftriaxone] Itching       Past Surgical History:   Procedure Laterality Date   • HYSTERECTOMY         Family History   Problem Relation Age of Onset   • Breast cancer Paternal Aunt    • Heart disease Mother    • Heart disease Father    • Heart disease  Sister    • Heart disease Brother        Social History     Socioeconomic History   • Marital status:      Spouse name: Not on file   • Number of children: Not on file   • Years of education: Not on file   • Highest education level: Not on file   Tobacco Use   • Smoking status: Current Every Day Smoker     Packs/day: 0.25     Years: 15.00     Pack years: 3.75     Types: Cigarettes   • Smokeless tobacco: Never Used   Substance and Sexual Activity   • Alcohol use: No   • Drug use: Defer   • Sexual activity: Defer           Objective   Physical Exam   Constitutional: She is oriented to person, place, and time. She appears well-developed. She appears lethargic. She appears ill.   HENT:   Head: Normocephalic.   Mouth/Throat: Oropharynx is clear and moist.   Eyes: Pupils are equal, round, and reactive to light. Left eye exhibits nystagmus.   Bilateral horizontal nystagmus   Neck: Normal range of motion.   Pulmonary/Chest: Effort normal and breath sounds normal.   Abdominal: Soft. Bowel sounds are normal.   Musculoskeletal: Normal range of motion.   Neurological: She is oriented to person, place, and time. She appears lethargic.   Skin: Skin is warm and dry. Capillary refill takes less than 2 seconds.   Psychiatric:   Flat affect, patient reaching at the air. She is lethargic but arousable.   Nursing note and vitals reviewed.      Procedures           ED Course  ED Course as of Mar 15 0722   Sun Mar 15, 2020   0628 Discussed case with Dr. Oquendo, who is agreeable that patient would be better served at hospital with neurology due to history of seizures and no return to baseline mental status.    [KK]   0516 Discussed case with Dr. Lord at [facility] who accepts patient to telemetry for neuro workup. Will call back with bed.    [KK]      ED Course User Index  [KK] Modesta Conde, APRN                                           MDM  Number of Diagnoses or Management Options  Altered mental status,  unspecified altered mental status type: new and requires workup  Seizure disorder (CMS/HCC): new and requires workup     Amount and/or Complexity of Data Reviewed  Clinical lab tests: ordered and reviewed  Tests in the radiology section of CPT®: ordered and reviewed  Tests in the medicine section of CPT®: reviewed and ordered  Decide to obtain previous medical records or to obtain history from someone other than the patient: yes    Risk of Complications, Morbidity, and/or Mortality  Presenting problems: moderate  Diagnostic procedures: moderate  Management options: moderate  General comments: Patient transferred to [facility] for further evaluation and treatment for altered mental status related to no neurology at this facility. Patient stable at time of transfer.    Patient Progress  Patient progress: improved      Final diagnoses:   Altered mental status, unspecified altered mental status type   Seizure disorder (CMS/HCC)            Modesta Conde, APRN  03/15/20 0722

## 2020-03-15 NOTE — NURSING NOTE
"  ACC REVIEW REPORT: [facility]        PATIENT NAME: Cristine Berkowitz    PATIENT ID: [id number]    BED: [id number]    BED TYPE: TELE    BED GIVEN TO: NASIM LAROSE RN     TIME BED GIVEN: 0815    YOB: 1965    AGE: 54 YRS    GENDER: FEMALE    PREVIOUS ADMIT TO [facility]:     PREVIOUS ADMISSION DATE:     PATIENT CLASS:     TODAY'S DATE: 3/15/2020    TRANSFER DATE: 03/15/2020    ETA:     TRANSFERRING FACILITY: [facility]    TRANSFERRING FACILITY PHONE # : [phone]    TRANSFERRING PROVIDER: SUSANNAH THAKKAR NP      DATE/TIME REQUEST RECEIVED: 03/15/2020    [facility] RN: ROSETTE GUERRERO    REPORT FROM:     TIME REPORT TAKEN: 0815    DIAGNOSIS: SEIZURES    REASON FOR TRANSFER TO [facility]: HIGHER LEVEL CARE    TRANSPORTATION: GROUND    CLINICAL REASON FOR TRANSFER TO [facility]: PATIENT CAME INTO THE ER YESTERDAY AFTER SHE FELL AT HOME, WAS CONFUSED AND \"NOT ACTING HERSELF\" PER FAMILY.  SHE DOES HAVE A HISTORY OF HAVING SEIZURES AND IS ON KEPPRA. THEY THOUGHT SHE HAD A SEIZURE YESTERDAY AND SENT HER HOME FROM THE ER.   BUT AROUND MIDNIGHT, THE FAMILY CALLED BACK AND SAID THAT SHE WAS STILL CONFUSED AND BROUGHT HER BACK IN.  ON ARRIVAL SHE WAS ORIENTED X 2 BUT CONFUSED OTHERWISE.  SHE COULD ANSWER SOME QUESTIONS.  SHE SEEMS POSTICTAL BUT UNUSUALLY  PROLONGED. SHE HAS 2-3 PETIT MAL SEIZURES TODAY PER REPORT.  SHE IS  COMPLAINING OF PAIN EVERYWHERE SHE IS TOUCHED AND HAS HAD TYLENOL.   THE FAMILY SAID THAT SHE DOESN'T ALWAYS TAKE HER MEDICATIONS HOWEVER,  DRUG SCREEN WAS NEGATIVE AND THERE WASN'T THERAPEUT.   THE FAMILY DIDN'T APPARERENTLY KNOW MUCH ABOUT HER.  CT HEAD WAS NEGATIVE, CXR WAS NEGATIVE, WBC WERE 12.40, GLUCOSE .  SHE HAS A SMALL ABRASION ON HER LFA FROM THE FALL.     SHE RECEIVED KEPPRA 1,000 MGIN THE ER.    1015 UPDATE: NURSE CALLED BACK FROM [facility] AND REPORTED THAT PATIENT'S NEURO STATUS HAS IMPROVED.  SHE IS NOW ALERT AND ORIENTED X3 AND MORE AWAKE.      CLINICAL INFORMATION    HEIGHT:     WEIGHT: 81.6 " KG    ALLERGIES:  AMOXICILLIN, STEROIDS, CODEINE, LATEX AND ROCEPHIN    GOSS:     INFECTIOUS DISEASE:     ISOLATION: NO    LAST VITAL SIGNS:  TIME: 0820  TEMP: 99.6 EARLY THIS MORNING  PULSE: 82  B/P: 107/36  RESP: 18  88% ON 4LNC    LAB INFORMATION: WBC-12.40, CRREAT-0.54, C02-35.3, CHLORIDE-94, GLUCOSE-119    CULTURE INFORMATION:     MEDS/IV FLUIDS: # 20 G RIGHT UPPER ARM      CARDIAC SYSTEM:    CHEST PAIN:     RATE:     SCALE:     RHYTHM:     Is patient taking or has patient been given any drugs that could increase bleeding? NO  (Plavix, Brilinta, Effient, Eliquis, Xarelto, Warfarin, Integrilin, Angiomax)    DRUG:      DOSE/FREQUENCY:     CARDIAC ENZYMES:    DATE:   TIME:   CK:   CKMB:   LISSET:   TROP:     DATE:   TIME:   CK:   CKMB:   LISSET:   TROP:     CARDIAC NOTES:       RESPIRATORY SYSTEM:    LUNG SOUNDS:    CLEAR:   CRACKLES:   WHEEZES:   RHONCHI:   DIMINISHED:   Lab Data   (Last 48 hours)     03/15 0457 03/15 0648       pH, Arterial 7.431     7.402          pCO2, Arterial 58.1High      62.8High           pO2, Arterial 58.3Low      67.4Low           HCO3, Arterial 38.6High      39.1High           Base Excess 11.9High      11.7High                      OXYGEN:     O2 SAT:     ADMINISTRATION ROUTE:     IMAGING FINDINGS:     PNEUMO LOCATION:     PNEUMO SIZE:     PNEUMO CHEST TUBE SEAL TYPE:     RADIOLOGY RESULTS:   IMPRESSION:  No acute cardiopulmonary findings.       RESPIRATORY STATUS:       CNS/MUSCULOSKELETAL    CT CERVICAL SPINE WITHOUT CONTRAST    FINDINGS:   The provided study demonstrates preservation of the vertebral body  heights in the sagittally reconstructed images.  There is no prevertebral soft tissue swelling.  Alignment is near anatomic.  The disc space heights are uniform.  I see no acute cervical spine fracture.     IMPRESSION:     1. Study is degraded by patient motion.  2. No convincing evidence of an acute cervical fracture    CAT SCAN RESULTS:   FINDINGS:   No hemorrhage, acute infarction,  mass lesion, or abnormal extra-axial fluid collection. No midline shift or focal mass effect. Ventricular system is normal in size and configuration. No acute osseous abnormality. Visualized paranasal sinuses are clear.  Visualized mastoid air cells are clear.     IMPRESSION:  No acute intracranial abnormality.    FINDINGS:   Today's study shows no mass, hemorrhage, or midline shift.   The ventricles, cisterns, and sulci are unremarkable. There is no  hydrocephalus.   There is no evidence of acute ischemia.  I do not see epidural or subdural hematoma.  The gray-white differentiation is appropriate.   The bone window setting images show no destructive calvarial lesion or  acute calvarial fracture.   The posterior fossa is unremarkable.            MRI RESULTS:     CNS/MUSCULOSKELETAL NOTES:   PATIENT IS ALERT AND ORIENTED TO SELF AND PLACE, OTHERWISE CONFUSED.       GI//GY-WNL      ABDOMINAL PAIN:     VOMITING:     DIARRHEA:     NAUSEA:     BOWEL SOUNDS:     OCCULT STOOL:     VAGINAL BLEEDING:     TESTICULAR PAIN:     HEMATURIA:     NG TUBE:    SIZE:   DATE INSERTED:       ULTRASOUND:     ULTRASOUND RESULTS:       ACUTE ABDOMEN:     ACUTE ABDOMEN RESULTS:       CT SCAN:     CT SCAN RESULTS:       GI//GY NOTES:     PAST MEDICAL HISTORY:     OTHER SYMPTOM NOTES:     ADDITIONAL NOTES:           Ksenia Taylor RN  3/15/2020  08:03

## 2020-03-15 NOTE — ED NOTES
Patient noted to be resting at this time. Skin warm and dry to touch. Denies any needs. Patient able to tell staff her name,  and daughters names. Encouraged patient to stay aware at this time. Call light and fall  Precautions in place.      Milka Koch, RN  [phone]

## 2020-03-15 NOTE — ED NOTES
Family called and states that they are on their way to come and get the patient.     Milka Koch, YOLANDA  03/14/20 2028

## 2020-03-15 NOTE — H&P
"    [facility] Medicine Services  HISTORY AND PHYSICAL    Patient Name: Cristine Berkowitz  : 1965  MRN: [id number]  Primary Care Physician: Afia Lopez APRN  Date of admission: 3/15/2020      Subjective   Subjective     Chief Complaint:  Uncontrolled seizures    HPI:  Cristine Berkowitz is a 54 y.o. female with PMHX of HTN, diet controlled DM2 (last a1c on chart 2018 6.5%), COPD, diastolic CHF with Pulm HTN based on ECHO from 2018 and takes daily Lasix, and known seizure disorder.  Presented to [facility] ED initially on 3/14/20 after a fall with confusion and HA, ultimately had a repeated witnessed seizure in [facility] ED mitigated by ativan ultimately returning to her normal cognitive baseline.  Although patient denied noncompliance with her Keppra, family had concerns she was not taking it properly/consistently.  She was d/c'd from [facility] ED into care of family, then returned again 3/15/20 by EMS after having another tonic-clonic seizure witnessed by family with profound lethargy and confusion after above the level of her typical post-ictal spells. Work ups in [facility] ED had CT head neg x2 as well as no evidence of underlying infectious process.    Upon my eval after arrival to floor, patient is alert and uncomfortable in the bed.  \"My back hurts, I'm so sore\".  Confused but will try to answer simple questions.     Review of Systems   Unable to reliably obtain due to confusion  All other systems reviewed and are negative.     Personal History     Past Medical History:   Diagnosis Date   • Arthritis    • Asthma    • CHF (congestive heart failure) (CMS/HCC)    • COPD (chronic obstructive pulmonary disease) (CMS/HCC)    • Coronary artery disease    • Diabetes mellitus (CMS/HCC)    • Hypertension    • Seizures (CMS/HCC)        Past Surgical History:   Procedure Laterality Date   • HYSTERECTOMY         Family History: family history includes Breast cancer in her paternal aunt; Heart disease in her " brother, father, mother, and sister. Otherwise pertinent FHx was reviewed and unremarkable.     Social History:  reports that she has been smoking cigarettes. She has a 3.75 pack-year smoking history. She has never used smokeless tobacco. Drug use questions deferred to the physician. She reports that she does not drink alcohol.  Social History     Social History Narrative   • Not on file       Medications:  Available home medication information reviewed.  Medications Prior to Admission   Medication Sig Dispense Refill Last Dose   • albuterol (PROVENTIL HFA;VENTOLIN HFA) 108 (90 Base) MCG/ACT inhaler Inhale 2 puffs Every 4 (Four) Hours As Needed for Wheezing.   Taking   • furosemide (LASIX) 20 MG tablet Take 20 mg by mouth 2 (Two) Times a Day.      • levETIRAcetam (KEPPRA) 750 MG tablet Take 750 mg by mouth 2 (Two) Times a Day.      • O2 (OXYGEN) Inhale Every Night.   Taking   • roflumilast (DALIRESP) 500 MCG tablet tablet Take  by mouth Daily.          Allergies   Allergen Reactions   • Amoxicillin Anaphylaxis   • Other Itching     STEROIDS   • Codeine Hives   • Latex Itching   • Rocephin [Ceftriaxone] Itching       Objective   Objective     Vital Signs:   Temp:  [97.8 °F (36.6 °C)-99.6 °F (37.6 °C)] 98.5 °F (36.9 °C)  Heart Rate:  [] 92  Resp:  [18] 18  BP: (104-158)/(40-90) 157/85        Physical Exam   Constitutional: No acute distress, awake, alert, nontoxic, overweight body habitus  Eyes: No nystagmus  HENT: NCAT  Respiratory: Clear to auscultation bilaterally, good effort, nonlabored respirations   Cardiovascular: RRR  Musculoskeletal: No peripheral edema, normal muscle tone for age  Psychiatric: encephalopathic, interactive but confused  Neurologic: Movements symmetric BUE and BLE, Cranial Nerves grossly intact, speech slow  Skin: No visable bruises      Results Reviewed:  I have personally reviewed current lab and radiology data.    Results from last 7 days   Lab Units 03/15/20  0407   WBC 10*3/mm3  12.40*   HEMOGLOBIN g/dL 13.3   HEMATOCRIT % 42.3   PLATELETS 10*3/mm3 175     Results from last 7 days   Lab Units 03/15/20  0407 03/14/20  1632 03/14/20  1353   SODIUM mmol/L 140  --  139   POTASSIUM mmol/L 3.9  --  3.8   CHLORIDE mmol/L 94*  --  92*   CO2 mmol/L 35.3*  --  35.1*   BUN mg/dL 9  --  8   CREATININE mg/dL 0.54*  --  0.49*   GLUCOSE mg/dL 119*  --  150*   CALCIUM mg/dL 10.0  --  9.8   ALT (SGPT) U/L 15  --  18   AST (SGOT) U/L 15  --  18   TROPONIN T ng/mL <0.010 <0.010 <0.010   LACTATE mmol/L 1.8  --   --      Estimated Creatinine Clearance: 113.7 mL/min (A) (by C-G formula based on SCr of 0.54 mg/dL (L)).  Brief Urine Lab Results  (Last result in the past 365 days)      Color   Clarity   Blood   Leuk Est   Nitrite   Protein   CREAT   Urine HCG        03/15/20 0415 Yellow Clear Trace Negative Negative Trace             Imaging Results (Last 24 Hours)     ** No results found for the last 24 hours. **        Results for orders placed in visit on 04/02/19   SCANNED - ECHOCARDIOGRAM       Assessment/Plan   Assessment & Plan     Active Hospital Problems    Diagnosis POA   • **Uncontrolled seizures (CMS/HCC) [R56.9] Yes   • Seizure disorder (CMS/HCC) [G40.909] Yes   • Pulmonary hypertension (CMS/HCC) [I27.20] Yes   • Mild diastolic dysfunction [I51.9] Yes   • Smoker [F17.200] Yes   • Moderate obesity [E66.8] Yes   • COPD (chronic obstructive pulmonary disease) (CMS/HCC) [J44.9] Yes   • Hypertension [I10] Yes       Uncontrolled seizures with known seizure disorder  - given 1g IV Keppra ~0400 at [facility]  - 1,500mg IV BID to start at 1600   - Neuro consulted for evaluation and recc's  - check CPK    Pulm HTN  Diastolic dysfunction   HTN  Presumed diet controlled DM2 (diabetes listed in PMHx but on no medications)  COPD  - hold home Lasix for now, euvolemic and has not taken much PO in past 24hrs due to post-ictal states  - trend BP's, use prns for now  - continue home Daliresp, prn nebs  - nicotine  preplacement  - last a1c 6.5% in 2018 --> check A1c    DVT prophylaxis:  Lovenox    CODE STATUS:    Code Status and Medical Interventions:   Ordered at: 03/15/20 1202     Code Status:    CPR     Medical Interventions (Level of Support Prior to Arrest):    Full       Admission Status:  I believe this patient meets INPATIENT status due to failed ED visits x2 in 24hrs to control seizures and need for Neurology evaluation for adjustment of medications urgently with ongoing seizure precautions.  I feel patient’s risk for adverse outcomes and need for care warrant INPATIENT evaluation and I predict the patient’s care encounter to likely last beyond 2 midnights.      Electronically signed by Callie Lord MD, 03/15/20, 12:05 PM.

## 2020-03-16 LAB — GLUCOSE BLDC GLUCOMTR-MCNC: 126 MG/DL (ref 70–130)

## 2020-03-16 PROCEDURE — 96372 THER/PROPH/DIAG INJ SC/IM: CPT

## 2020-03-16 PROCEDURE — 97161 PT EVAL LOW COMPLEX 20 MIN: CPT

## 2020-03-16 PROCEDURE — G0378 HOSPITAL OBSERVATION PER HR: HCPCS

## 2020-03-16 PROCEDURE — 63710000001 DIPHENHYDRAMINE PER 50 MG: Performed by: FAMILY MEDICINE

## 2020-03-16 PROCEDURE — 82962 GLUCOSE BLOOD TEST: CPT

## 2020-03-16 PROCEDURE — 25010000002 ENOXAPARIN PER 10 MG: Performed by: FAMILY MEDICINE

## 2020-03-16 PROCEDURE — 99225 PR SBSQ OBSERVATION CARE/DAY 25 MINUTES: CPT | Performed by: INTERNAL MEDICINE

## 2020-03-16 PROCEDURE — 25010000003 LEVETIRACETAM IN NACL 0.54% 1500 MG/100ML SOLUTION: Performed by: PSYCHIATRY & NEUROLOGY

## 2020-03-16 PROCEDURE — 99213 OFFICE O/P EST LOW 20 MIN: CPT | Performed by: PSYCHIATRY & NEUROLOGY

## 2020-03-16 RX ADMIN — LEVETIRACETAM INJECTION 1500 MG: 15 INJECTION INTRAVENOUS at 08:28

## 2020-03-16 RX ADMIN — DIPHENHYDRAMINE HYDROCHLORIDE 25 MG: 25 CAPSULE ORAL at 22:08

## 2020-03-16 RX ADMIN — ACETAMINOPHEN 650 MG: 325 TABLET, FILM COATED ORAL at 22:08

## 2020-03-16 RX ADMIN — SODIUM CHLORIDE, PRESERVATIVE FREE 10 ML: 5 INJECTION INTRAVENOUS at 08:29

## 2020-03-16 RX ADMIN — ROFLUMILAST 500 MCG: 500 TABLET ORAL at 08:28

## 2020-03-16 RX ADMIN — SODIUM CHLORIDE, PRESERVATIVE FREE 10 ML: 5 INJECTION INTRAVENOUS at 22:08

## 2020-03-16 RX ADMIN — ENOXAPARIN SODIUM 40 MG: 40 INJECTION SUBCUTANEOUS at 13:22

## 2020-03-16 RX ADMIN — LEVETIRACETAM INJECTION 1500 MG: 15 INJECTION INTRAVENOUS at 22:07

## 2020-03-16 NOTE — PAYOR COMM NOTE
"Andrea Gilmore (54 y.o. Female)     Date of Birth Social Security Number Address Home Phone MRN    1965  [address]  [place] [address] [phone] [id number]    Anabaptism Marital Status          Muslim        Admission Date Admission Type Admitting Provider Attending Provider Department, Room/Bed    3/15/20 Urgent Callie Lord MD Hall, Holly, MD [facility] 3E, S338/1    Discharge Date Discharge Disposition Discharge Destination                       Attending Provider:  Callie Lord MD    Allergies:  Amoxicillin, Other, Codeine, Latex, Rocephin [Ceftriaxone]    Isolation:  None   Infection:  None   Code Status:  CPR    Ht:  152.4 cm (60\")   Wt:  79.4 kg (175 lb)    Admission Cmt:  None   Principal Problem:  Uncontrolled seizures (CMS/HCC) [R56.9]                 Active Insurance as of 3/15/2020     Primary Coverage     Payor Plan Insurance Group Employer/Plan Group    [employer] [place] [employer]      Payor Plan Address Payor Plan Phone Number Payor Plan Fax Number Effective Dates    [address] [address] [phone]  11/11/2016 - None Entered    [facility] [id number]       Subscriber Name Subscriber Birth Date Member ID       ANDREA GILMORE 1965 [id number]                 Emergency Contacts      (Rel.) Home Phone Work Phone Mobile Phone    Jhon Oropeza (Other) [phone] -- --    BENITEZ GILMORE (Son) -- -- [phone]            Insurance Information                [facility]/WELLCARE MEDICAID Phone: [phone]    Subscriber: Andrea Gilmore Subscriber#: [id number]    Group#:  Precert#:           Treatment Team     Provider Relationship Specialty Contact    Callie Lord MD Attending -- [phone]    Tennille Jones PCT Patient Care Technician --     Halima Vasquez, RRT Respiratory Therapist Respiratory Therapy [phone]    Hesham Gupta RN Registered Nurse -- [phone]    Jaime Ellison MD Consulting Physician " Neurology [phone]          Problem List           Codes Noted - Resolved       Hospital    Seizure disorder (CMS/HCC) (Chronic) ICD-10-CM: G40.909  ICD-9-CM: 345.90 3/15/2020 - Present    Pulmonary hypertension (CMS/HCC) (Chronic) ICD-10-CM: I27.20  ICD-9-CM: 416.8 3/15/2020 - Present    Mild diastolic dysfunction (Chronic) ICD-10-CM: I51.9  ICD-9-CM: 429.9 3/15/2020 - Present    * (Principal) Uncontrolled seizures (CMS/HCC) ICD-10-CM: R56.9  ICD-9-CM: 780.39 3/15/2020 - Present    Smoker (Chronic) ICD-10-CM: F17.200  ICD-9-CM: 305.1 3/15/2020 - Present    Moderate obesity (Chronic) ICD-10-CM: E66.8  ICD-9-CM: 278.00 2018 - Present    COPD (chronic obstructive pulmonary disease) (CMS/[facility]) (Chronic) ICD-10-CM: J44.9  ICD-9-CM: 496 2018 - Present    Hypertension (Chronic) ICD-10-CM: I10  ICD-9-CM: 401.9 2018 - Present       Non-Hospital    Acute heart failure, previous ICD-10-CM: I50.9  ICD-9-CM: 428.9 2018 - Present             History & Physical      Callie Lord MD at 03/15/20 [address] [place] [address] Medicine Services  HISTORY AND PHYSICAL    Patient Name: Cristine Berkowitz  : 1965  MRN: [id number]  Primary Care Physician: Afia Lopez, ALONDRA  Date of admission: 3/15/2020      Subjective   Subjective     Chief Complaint:  Uncontrolled seizures    HPI:  Cristine Berkowitz is a 54 y.o. female with PMHX of HTN, diet controlled DM2 (last a1c on chart 2018 6.5%), COPD, diastolic CHF with Pulm HTN based on ECHO from 2018 and takes daily Lasix, and known seizure disorder.  Presented to [facility] ED initially on 3/14/20 after a fall with confusion and HA, ultimately had a repeated witnessed seizure in [facility] ED mitigated by ativan ultimately returning to her normal cognitive baseline.  Although patient denied noncompliance with her Keppra, family had concerns she was not taking it properly/consistently.  She was d/c'd from [facility] ED into care of family, then returned again  "3/15/20 by EMS after having another tonic-clonic seizure witnessed by family with profound lethargy and confusion after above the level of her typical post-ictal spells. Work ups in [facility] ED had CT head neg x2 as well as no evidence of underlying infectious process.    Upon my eval after arrival to floor, patient is alert and uncomfortable in the bed.  \"My back hurts, I'm so sore\".  Confused but will try to answer simple questions.     Review of Systems   Unable to reliably obtain due to confusion  All other systems reviewed and are negative.     Personal History     Past Medical History:   Diagnosis Date   • Arthritis    • Asthma    • CHF (congestive heart failure) (CMS/HCC)    • COPD (chronic obstructive pulmonary disease) (CMS/HCC)    • Coronary artery disease    • Diabetes mellitus (CMS/HCC)    • Hypertension    • Seizures (CMS/HCC)        Past Surgical History:   Procedure Laterality Date   • HYSTERECTOMY         Family History: family history includes Breast cancer in her paternal aunt; Heart disease in her brother, father, mother, and sister. Otherwise pertinent FHx was reviewed and unremarkable.     Social History:  reports that she has been smoking cigarettes. She has a 3.75 pack-year smoking history. She has never used smokeless tobacco. Drug use questions deferred to the physician. She reports that she does not drink alcohol.  Social History     Social History Narrative   • Not on file       Medications:  Available home medication information reviewed.  Medications Prior to Admission   Medication Sig Dispense Refill Last Dose   • albuterol (PROVENTIL HFA;VENTOLIN HFA) 108 (90 Base) MCG/ACT inhaler Inhale 2 puffs Every 4 (Four) Hours As Needed for Wheezing.   Taking   • furosemide (LASIX) 20 MG tablet Take 20 mg by mouth 2 (Two) Times a Day.      • levETIRAcetam (KEPPRA) 750 MG tablet Take 750 mg by mouth 2 (Two) Times a Day.      • O2 (OXYGEN) Inhale Every Night.   Taking   • roflumilast (DALIRESP) 500 MCG " tablet tablet Take  by mouth Daily.          Allergies   Allergen Reactions   • Amoxicillin Anaphylaxis   • Other Itching     STEROIDS   • Codeine Hives   • Latex Itching   • Rocephin [Ceftriaxone] Itching       Objective   Objective     Vital Signs:   Temp:  [97.8 °F (36.6 °C)-99.6 °F (37.6 °C)] 98.5 °F (36.9 °C)  Heart Rate:  [] 92  Resp:  [18] 18  BP: (104-158)/(40-90) 157/85        Physical Exam   Constitutional: No acute distress, awake, alert, nontoxic, overweight body habitus  Eyes: No nystagmus  HENT: NCAT  Respiratory: Clear to auscultation bilaterally, good effort, nonlabored respirations   Cardiovascular: RRR  Musculoskeletal: No peripheral edema, normal muscle tone for age  Psychiatric: encephalopathic, interactive but confused  Neurologic: Movements symmetric BUE and BLE, Cranial Nerves grossly intact, speech slow  Skin: No visable bruises      Results Reviewed:  I have personally reviewed current lab and radiology data.    Results from last 7 days   Lab Units 03/15/20  0407   WBC 10*3/mm3 12.40*   HEMOGLOBIN g/dL 13.3   HEMATOCRIT % 42.3   PLATELETS 10*3/mm3 175     Results from last 7 days   Lab Units 03/15/20  0407 03/14/20  1632 03/14/20  1353   SODIUM mmol/L 140  --  139   POTASSIUM mmol/L 3.9  --  3.8   CHLORIDE mmol/L 94*  --  92*   CO2 mmol/L 35.3*  --  35.1*   BUN mg/dL 9  --  8   CREATININE mg/dL 0.54*  --  0.49*   GLUCOSE mg/dL 119*  --  150*   CALCIUM mg/dL 10.0  --  9.8   ALT (SGPT) U/L 15  --  18   AST (SGOT) U/L 15  --  18   TROPONIN T ng/mL <0.010 <0.010 <0.010   LACTATE mmol/L 1.8  --   --      Estimated Creatinine Clearance: 113.7 mL/min (A) (by C-G formula based on SCr of 0.54 mg/dL (L)).  Brief Urine Lab Results  (Last result in the past 365 days)      Color   Clarity   Blood   Leuk Est   Nitrite   Protein   CREAT   Urine HCG        03/15/20 0415 Yellow Clear Trace Negative Negative Trace             Imaging Results (Last 24 Hours)     ** No results found for the last 24 hours.  **        Results for orders placed in visit on 04/02/19   SCANNED - ECHOCARDIOGRAM       Assessment/Plan   Assessment & Plan     Active Hospital Problems    Diagnosis POA   • **Uncontrolled seizures (CMS/[facility]) [R56.9] Yes   • Seizure disorder (CMS/[facility]) [G40.909] Yes   • Pulmonary hypertension (CMS/[facility]) [I27.20] Yes   • Mild diastolic dysfunction [I51.9] Yes   • Smoker [F17.200] Yes   • Moderate obesity [E66.8] Yes   • COPD (chronic obstructive pulmonary disease) (CMS/[facility]) [J44.9] Yes   • Hypertension [I10] Yes       Uncontrolled seizures with known seizure disorder  - given 1g IV Keppra ~0400 at [facility]  - 1,500mg IV BID to start at 1600   - Neuro consulted for evaluation and recc's  - check CPK    Pulm HTN  Diastolic dysfunction   HTN  Presumed diet controlled DM2 (diabetes listed in PMHx but on no medications)  COPD  - hold home Lasix for now, euvolemic and has not taken much PO in past 24hrs due to post-ictal states  - trend BP's, use prns for now  - continue home Daliresp, prn nebs  - nicotine preplacement  - last a1c 6.5% in 2018 --> check A1c    DVT prophylaxis:  Lovenox    CODE STATUS:    Code Status and Medical Interventions:   Ordered at: 03/15/20 1202     Code Status:    CPR     Medical Interventions (Level of Support Prior to Arrest):    Full       Admission Status:  I believe this patient meets INPATIENT status due to failed ED visits x2 in 24hrs to control seizures and need for Neurology evaluation for adjustment of medications urgently with ongoing seizure precautions.  I feel patient’s risk for adverse outcomes and need for care warrant INPATIENT evaluation and I predict the patient’s care encounter to likely last beyond 2 midnights.      Electronically signed by Callie Lord MD, 03/15/20, 12:05 PM.      Electronically signed by Callie Lord MD at 03/15/20 1314       Emergency Department Notes    No notes of this type exist for this encounter.         Vital Signs (last day)     Date/Time   Temp   Temp src    Pulse   Resp   BP   Patient Position   SpO2    03/15/20 1900   98.3 (36.8)   Oral   82   18   148/72   Lying   91    03/15/20 1809   --   --   84   --   --   --   95    03/15/20 1529   98.3 (36.8)   Oral   87   16   143/62   Lying   94    03/15/20 1420   --   --   83   --   --   --   --    03/15/20 1147   98.5 (36.9)   Oral   92   18   157/85   Lying   90              Oxygen Therapy (last day)     Date/Time   SpO2   Device (Oxygen Therapy)   Flow (L/min)   Oxygen Concentration (%)   ETCO2 (mmHg)    03/16/20 0000   --   nasal cannula   5   --   --    03/15/20 1900   91   --   --   --   --    03/15/20 1809   95   --   --   --   --    03/15/20 1529   94   nasal cannula   3   --   --    03/15/20 1147   90   nasal cannula   3   --   --              Intake & Output (last day)       03/15 0701 - 03/16 0700    P.O. 340    Total Intake(mL/kg) 340 (4.3)    Urine (mL/kg/hr) 400    Total Output 400    Net -60             Prior to Admission Medications     Prescriptions Last Dose Informant Patient Reported? Taking?    albuterol (PROVENTIL HFA;VENTOLIN HFA) 108 (90 Base) MCG/ACT inhaler  Self Yes No    Inhale 2 puffs Every 4 (Four) Hours As Needed for Wheezing.    furosemide (LASIX) 20 MG tablet   Yes No    Take 20 mg by mouth 2 (Two) Times a Day.    levETIRAcetam (KEPPRA) 750 MG tablet   Yes No    Take 750 mg by mouth 2 (Two) Times a Day.    O2 (OXYGEN)   Yes No    Inhale Every Night.    roflumilast (DALIRESP) 500 MCG tablet tablet   Yes No    Take  by mouth Daily.            Lab Results (last 24 hours)     Procedure Component Value Units Date/Time    CK [[id number]]  (Normal) Collected:  03/15/20 1329    Specimen:  Blood Updated:  03/15/20 1402     Creatine Kinase 37 U/L     Hemoglobin A1c [[id number]]  (Normal) Collected:  03/15/20 1212    Specimen:  Blood Updated:  03/15/20 1347     Hemoglobin A1C 5.60 %     Narrative:       Hemoglobin A1C Ranges:    Increased Risk for Diabetes  5.7% to 6.4%  Diabetes                     >=  6.5%  Diabetic Goal                < 7.0%    Levetiracetam Level (Keppra) [[id number]] Collected:  03/15/20 1212    Specimen:  Blood Updated:  03/15/20 1221        Imaging Results (Last 24 Hours)     Procedure Component Value Units Date/Time    MRI Brain With & Without Contrast [[id number]] Resulted:  03/15/20 1635     Updated:  03/15/20 1650        Orders (last 24 hrs)      Start     Ordered    03/15/20 2100  levETIRAcetam in NaCl 0.54% (KEPPRA) IVPB 1,500 mg  Every 12 Hours Scheduled      03/15/20 1223    03/15/20 1730  gadobenate dimeglumine (MULTIHANCE) injection 15 mL  Once in Imaging      03/15/20 1636    03/15/20 1315  nicotine (NICODERM CQ) 21 MG/24HR patch 1 patch  Every 24 Hours Scheduled      03/15/20 1220    03/15/20 1305  CK  STAT      03/15/20 1304    03/15/20 1300  roflumilast (DALIRESP) tablet 500 mcg  Daily      03/15/20 1201    03/15/20 1300  sodium chloride 0.9 % flush 10 mL  Every 12 Hours Scheduled      03/15/20 1201    03/15/20 1300  enoxaparin (LOVENOX) syringe 40 mg  Every 24 Hours      03/15/20 1201    03/15/20 1300  levETIRAcetam (KEPPRA) 750 mg in sodium chloride 0.9 % 100 mL IVPB  Every 12 Hours Scheduled,   Status:  Discontinued      03/15/20 1203    03/15/20 1223  EEG  STAT      03/15/20 1223    03/15/20 1223  MRI Brain With & Without Contrast  1 Time Imaging      03/15/20 1223    03/15/20 1221  ipratropium-albuterol (DUO-NEB) nebulizer solution 3 mL  Every 6 Hours PRN      03/15/20 1221    03/15/20 1207  Hemoglobin A1c  STAT      03/15/20 1207    03/15/20 1205  Inpatient Case Management  Consult  Once     Provider:  (Not yet assigned)    03/15/20 1204    03/15/20 1201  Cardiac Monitoring  Continuous      03/15/20 1201    03/15/20 1200  Vital Signs  Every 4 Hours      03/15/20 1201    03/15/20 1200  calcium carbonate EX (TUMS EX) chewable tablet 750 mg  2 Times Daily PRN      03/15/20 1201    03/15/20 1159  ondansetron (ZOFRAN) injection 4 mg  Every 6 Hours PRN       03/15/20 1201    03/15/20 1159  bisacodyl (DULCOLAX) EC tablet 5 mg  Daily PRN      03/15/20 1201    03/15/20 1159  diphenhydrAMINE (BENADRYL) capsule 25 mg  Nightly PRN      03/15/20 1201    03/15/20 1158  LORazepam (ATIVAN) injection 1 mg  Every 4 Hours PRN      03/15/20 1201    03/15/20 1158  acetaminophen (TYLENOL) tablet 650 mg  Every 4 Hours PRN      03/15/20 1201    03/15/20 1158  Seizure Precautions  Continuous      03/15/20 1201    03/15/20 1158  Diet Regular; Cardiac  Diet Effective Now      03/15/20 1201    03/15/20 1158  Inpatient Neurology Consult General  Once     Specialty:  Neurology  Provider:  Jaime Ellison MD    03/15/20 1201    03/15/20 1157  Code Status and Medical Interventions:  Continuous      03/15/20 1201    03/15/20 1157  Intake & Output  Every Shift      03/15/20 1201    03/15/20 1157  Weigh Patient  Once      03/15/20 1201    03/15/20 1157  Oxygen Therapy- Nasal Cannula; Titrate for SPO2: 90% - 95%  Continuous      03/15/20 1201    03/15/20 1157  Insert Peripheral IV  Once      03/15/20 1201    03/15/20 1157  Saline Lock & Maintain IV Access  Continuous      03/15/20 1201    03/15/20 1157  Inpatient Admission  Once      03/15/20 1201    03/15/20 1156  sodium chloride 0.9 % flush 10 mL  As Needed      03/15/20 1201    03/15/20 1156  Levetiracetam Level (Keppra)  STAT      03/15/20 1201                Physician Progress Notes (last 24 hours) (Notes from 03/15/20 0152 through 03/16/20 0152)    No notes of this type exist for this encounter.            Nursing Assessments (last 24 hours)      Adult PCS Body System     Row Name 03/16/20 0000 03/15/20 2300 03/15/20 1800 03/15/20 1700 03/15/20 1600       Pain/Comfort/Sleep    Presence of Pain  non-verbal indicators absent  --  non-verbal indicators absent  non-verbal indicators present  --    Preferred Pain Scale  --  --  rFLACC (Revised Face Legs Arms Cry Consolability Scale)  rFLACC (Revised Face Legs Arms Cry Consolability Scale)  --     rFLACC Pain Rating: - Face  --  --  0-->no particular expression or smile  1-->occasional grimace or frown, withdrawn, disinterested, appears sad or worried  --    rFLACC Pain Rating: - Legs  --  --  0-->normal position or relaxed  0-->normal position or relaxed  --    rFLACC Pain Rating: - Activity  --  --  0-->lying quietly, normal position, moves easily  1-->squirming, shifting back and forth, tense, mildly agitated, shallow, splinting respirations, intermittent signs  --    rFLACC Pain Rating: - Cry  --  --  0-->no cry (awake or asleep)  0-->no cry (awake or asleep)  --    rFLACC Pain Rating: - Consolability  --  --  0-->content, relaxed  0-->content, relaxed  --    rFLACC Score:  --  --  0  2  --    Pain Management Interventions  --  --  care clustered;position adjusted;pain management plan reviewed with patient/caregiver;pillow support provided;quiet environment facilitated;unnecessary movement minimized  care clustered;position adjusted;quiet environment facilitated;unnecessary movement minimized  --    POSS ([place]ero Opioid-Induced Sed Scale)  --  --  1 - Awake and alert  1 - Awake and alert  --    Sleep/Rest/Relaxation  appears asleep  --  no problem identified;awake  awake;no problem identified  --    Additional Pain Site Documentation  Pain Assessment: Number Scale (0-10) ([facility])  --  --  --  --       Pain Assessment: Number Scale (0-10) 03/16/20 0014 Bilateral generalized    Pain Assessment: Number Scale (0-10) - Properties Group Date first assessed: 03/16/20 Time first assessed: 0014 Pain Body Location - Side: Bilateral Pain Body Location - Orientation: generalized    Frequency  frequent  --  --  --  --    Quality  aching  --  --  --  --    Associated Signs/Symptoms  dyspnea;weakness  --  --  --  --    Pain Rating (0-10): Rest  0  --  --  --  --    Pain Rating (0-10): Activity  6  --  --  --  --       Pain/Comfort/Sleep Interventions    Sleep/Rest Enhancement  awakenings minimized;regular sleep/rest pattern  promoted  --  --  --  --       Coping/Psychosocial    Observed Emotional State  accepting;calm;cooperative  --  --  accepting;calm;cooperative  --    Verbalized Emotional State  acceptance  --  --  acceptance  --    Plan of Care Reviewed With  patient  --  --  patient  --       Psychosocial Support    Trust Relationship/Rapport  care explained;empathic listening provided;emotional support provided  --  --  --  --    Family/Support System Care  support provided;self-care encouraged  --  --  --  --       Involvement in Care    Family/Support System, Persons  son  --  --  --  --    Involvement in Care  not present at bedside;not participating in care;not attentive to patient  --  --  --  --       Coping/Psychosocial Interventions    Supportive Measures  active listening utilized;decision-making supported  --  --  --  --       HEENT    HEENT WDL  WDL  --  --  WDL  --       Mouth/Teeth WDL    Mouth/Teeth WDL  WDL  --  --  --  --       Cognitive    Cognitive/Neuro/Behavioral WDL  WDL  --  --  WDL  --    Level of Consciousness  Alert  --  --  --  --    Arousal Level  arouses to touch/gentle shaking  --  --  --  --    Orientation  oriented x 4  --  --  --  --    Mood/Behavior  calm;cooperative  --  --  --  --       Cognitive Interventions    Communication Enhancement Strategies  call light answered in person  --  --  --  --       Neuro    Additional Documentation  [place] Coma Scale (Group)  --  --  --  --       Pupils    Pupil PERRLA  yes  --  --  --  --       [place] Coma Scale    Best Eye Response  4-->(E4) spontaneous  --  --  --  --    Best Motor Response  6-->(M6) obeys commands  --  --  --  --    Best Verbal Response  5-->(V5) oriented  --  --  --  --    [place] Coma Scale Score  15  --  --  --  --       Behavior WDL    Behavior WDL  WDL  --  --  --  --       Respiratory    Respiratory WDL  WDL except;effort/expansion  --  --  --  --    Cough And Deep Breathing  done independently per patient  --  --  --  --        Oxygen Therapy    Flow (L/min)  5  --  --  --  --    Device (Oxygen Therapy)  nasal cannula  --  --  --  --       Respiratory Interventions    Airway/Ventilation Management  calming measures promoted  --  --  --  --       Cardiac    Cardiac WDL  WDL  --  --  --  --       Peripheral Neurovascular    Peripheral Neurovascular WDL  WDL;capillary refill general  --  --  --  --    Capillary Refill, General  greater than 3 secs  --  --  --  --    VTE Prevention/Management  dorsiflexion/plantar flexion performed  --  --  --  --       Neurovascular Assessment    Neurovascular Assessment  General  --  --  --  --    All Extremities Temperature  warm  --  --  --  --    All Extremities Color  dusky  --  --  --  --    All Extremities Sensation  no numbness;no tingling  --  --  --  --       Gastrointestinal    GI WDL  WDL  --  --  --  --    Abdominal Appearance  rounded;obese  --  --  --  --    Bowel Sounds  All Quadrants  --  --  --  --    All Quadrants Bowel Sounds  audible and normoactive  --  --  --  --    Last Bowel Movement  03/15/20  --  --  --  --       Genitourinary    Genitourinary WDL  WDL except;female symptoms;urine  --  --  --  --    Voiding Characteristics  voids spontaneously without difficulty urine smells very strong  --  --  --  --    Urine Characteristics  bright;yellow  --  --  --  --       Skin    Skin WDL  WDL except;characteristics  --  --  --  --    Skin Temperature  warm  --  --  --  --    Skin Moisture  dry  --  --  --  --    Skin Elasticity  quick return to original state  --  --  --  --    Skin Integrity  excoriation  --  --  --  --       Froy Risk Assessment (If Froy score </= 18, add the appropriate CPG to the care plan)    Sensory Perception  3-->slightly limited  --  --  --  --    Moisture  3-->occasionally moist  --  --  --  --    Activity  3-->walks occasionally  --  --  --  --    Mobility  3-->slightly limited  --  --  --  --    Nutrition  3-->adequate  --  --  --  --    Friction and Shear   3-->no apparent problem  --  --  --  --    Froy Score  18  --  --  --  --       Skin Interventions    Pressure Reduction Devices  pressure-redistributing mattress utilized  --  pressure-redistributing mattress utilized;positioning supports utilized;heel offloading device utilized  --  --    Pressure Reduction Techniques  frequent weight shift encouraged;weight shift assistance provided  --  frequent weight shift encouraged;pressure points protected;rest period provided between sit times;positioned off wounds  --  --    Skin Protection  adhesive use limited;incontinence pads utilized;skin sealant/moisture barrier applied  --  adhesive use limited;incontinence pads utilized;protective footwear used;skin-to-device areas padded;skin-to-skin areas padded;tubing/devices free from skin contact  --  --       Musculoskeletal    Musculoskeletal WDL  WDL;mobility  --  --  --  --    General Mobility  generalized weakness;mildly impaired  --  --  --  --       Nutrition    Diet/Nutrition Received  regular;low saturated fat/low cholesterol  --  --  --  --    Nutrition Risk Screen  no indicators present  --  --  --  --       Peripheral IV 03/15/20 0400 Right;Upper Arm    IV Properties Placement Date: 03/15/20 Placement Time: 0400 Hand Hygiene Completed: Yes Size (Gauge): 20 G Orientation: Right;Upper Location: Arm Site Prep: Chlorhexidine Technique: Anatomical landmarks Inserted by: YOLANDA Lozada Total insertion attempts: 1 Patient Tolerance: Tolerated well    Site Assessment  Clean;Dry;Intact  --  --  --  --    Dressing Type  Transparent  --  --  --  --    Line Status  Flushed;Saline locked  --  --  --  --    Dressing Status  Clean;Dry;Intact  --  --  --  --    Reason Not Rotated  Not due  --  --  --  --    Phlebitis  0-->no symptoms bruised  --  --  --  --       Sepsis Screening Options    Which Sepsis Screen to be Used?  Adult Sepsis Screen  --  --  --  --       Adult Sepsis Screening Tool    Previous adult screen positive in last  48 hrs?  no  --  --  --  --    1. Criteria Present  HR > 90 bpm  --  --  --  --    Are 2 or > of the above criteria present?  no: STOP/negative screen  --  --  --  --       Safety    Safety WDL  WDL  --  WDL  WDL  --    Safety Factors  bed in low position;wheels locked;call light in reach;upper side rails raised x 2;ID band on  --  --  --  --    All Alarms  alarm(s) activated and audible  --  alarm(s) activated and audible  alarm(s) activated and audible  --    Safety/Security Measures  bed alarm set  --  bed alarm set  bed alarm set  --       [facility] High Risk Falls Assessment (If Fall score is >/=13, add the Fall Risk CPG to the care plan)     Fallen in past 6 months  0--> No  0--> No  --  --  --    Mental Status  0--> no mental status change  0--> no mental status change  --  --  --    Elimination  0--> No elimination issues  0--> No elimination issues  --  --  --    Mobility  0--> No mobility issues weakness  0--> No mobility issues stand by assist as precaution  --  --  --    Medications  0--> No meds  --  --  --  --    Nurses' Clinical Judgement  10  --  --  --  --    Total Fall Risk Score  10  --  --  --  --       Safety Management    Safety Promotion/Fall Prevention  activity supervised;fall prevention program maintained;gait belt;muscle strengthening facilitated;nonskid shoes/slippers when out of bed;safety round/check completed;toileting scheduled  --  activity supervised;fall prevention program maintained;nonskid shoes/slippers when out of bed;safety round/check completed  activity supervised;fall prevention program maintained;nonskid shoes/slippers when out of bed;safety round/check completed;toileting scheduled  patient off unit    Medication Review/Management  medications reviewed;high risk medications identified  --  --  --  --    Environmental Safety Modification  clutter free environment maintained;room near unit station;room organization consistent  --  assistive device/personal items within  reach;clutter free environment maintained;room organization consistent  assistive device/personal items within reach;clutter free environment maintained;lighting adjusted;room organization consistent  --       Safety Interventions    Aspiration Prevention During Seizure  positioned on side during seizure  --  --  --  --    Seizure Precautions  activity supervised;clutter-free environment maintained;side rails padded;soft boundaries provided  --  --  --  --       Daily Care    Activity Management  --  --  activity adjusted per tolerance;activity encouraged  activity adjusted per tolerance;activity encouraged  --    Activity Assistance Provided  --  --  assistance, 1 person  assistance, 1 person  --    Symptoms Noted During/After Activity  --  --  none  none  --    Specialty Bed/Overlay  --  --  overlay, nonpowered/static  overlay, nonpowered/static  --       Positioning    Body Position  neutral head position;neutral body alignment;supine  --  turned;position changed independently;side-lying, left  turned;supine;position changed independently  --    Head of Bed (HOB)  HOB at 20 degrees  --  HOB at 60-90 degrees  HOB elevated;HOB at 90 degrees  --    Positioning/Transfer Devices  pillows;in use  --  pillows;in use  pillows;in use  --    Row Name 03/15/20 1420 03/15/20 1201 03/15/20 1147             Pain/Comfort/Sleep    Presence of Pain  non-verbal indicators present  --  non-verbal indicators absent      Preferred Pain Scale  rFLACC (Revised Face Legs Arms Cry Consolability Scale)  --  rFLACC (Revised Face Legs Arms Cry Consolability Scale)      rFLACC Pain Rating: - Face  0-->no particular expression or smile  --  1-->occasional grimace or frown, withdrawn, disinterested, appears sad or worried      rFLACC Pain Rating: - Legs  0-->normal position or relaxed  --  0-->normal position or relaxed      rFLACC Pain Rating: - Activity  0-->lying quietly, normal position, moves easily  --  0-->lying quietly, normal  position, moves easily      rFLACC Pain Rating: - Cry  0-->no cry (awake or asleep)  --  0-->no cry (awake or asleep)      rFLACC Pain Rating: - Consolability  0-->content, relaxed  --  0-->content, relaxed      rFLACC Score:  0  --  1      Pain Management Interventions  --  --  care clustered;pain management plan reviewed with patient/caregiver;pillow support provided;position adjusted;quiet environment facilitated;unnecessary movement minimized;relaxation techniques promoted      POSS (Pasero Opioid-Induced Sed Scale)  1 - Awake and alert  --  1 - Awake and alert      Sleep/Rest/Relaxation  no problem identified;awake  --  no problem identified;awake         Pain/Comfort/Sleep Interventions    Sleep/Rest Enhancement  --  --  awakenings minimized;regular sleep/rest pattern promoted         Coping/Psychosocial    Observed Emotional State  --  --  apprehensive;calm;cooperative lethargic      Verbalized Emotional State  --  --  powerlessness      Plan of Care Reviewed With  --  --  patient         Psychosocial Support    Trust Relationship/Rapport  --  --  care explained;choices provided;questions answered;questions encouraged;thoughts/feelings acknowledged      Diversional Activities  --  --  television      Family/Support System Care  --  --  self-care encouraged;presence promoted         Involvement in Care    Family/Support System, Persons  --  --  other (see comments) no family present      Involvement in Care  --  --  not present at bedside         Coping/Psychosocial Interventions    Supportive Measures  --  --  active listening utilized;self-care encouraged;verbalization of feelings encouraged         HEENT    HEENT WDL  --  --  WDL         Mouth/Teeth WDL    Mouth/Teeth WDL  --  --  WDL         Cognitive    Cognitive/Neuro/Behavioral WDL  --  --  WDL;arousability;mood/behavior;orientation;level of consciousness      Level of Consciousness  --  --  Responds to Voice      Arousal Level  --  --  arouses to  voice;arouses to touch/gentle shaking      Orientation  --  --  disoriented to;time      Mood/Behavior  --  --  cooperative;flat affect         Cognitive Interventions    Communication Enhancement Strategies  --  --  call light answered in person;extra time allowed for response;one-step directions provided         Pupils    Pupil PERRLA  --  --  yes         Behavioral    Additional Documentation  --  --  Behavior WDL (Group)         Behavior WDL    Behavior WDL  --  --  WDL;interactions      Interactions  --  --  cooperative;other (see comments) lethargic         Respiratory    Respiratory WDL  --  --  WDL      Cough And Deep Breathing  --  --  done with encouragement         Respiratory Interventions    Airway/Ventilation Management  --  --  airway patency maintained;pulmonary hygiene promoted         Cardiac    Cardiac WDL  --  --  WDL         Peripheral Neurovascular    Peripheral Neurovascular WDL  --  --  WDL;capillary refill general      Capillary Refill, General  --  --  less than/equal to 3 secs      VTE Prevention/Management  --  --  dorsiflexion/plantar flexion performed         Gastrointestinal    GI WDL  --  --  WDL;appearance/characteristics;bowel sounds      Abdominal Appearance  --  --  rounded;obese      Bowel Sounds  --  --  All Quadrants      All Quadrants Bowel Sounds  --  --  audible and normoactive      Last Bowel Movement  --  --  03/14/20         Genitourinary    Genitourinary WDL  --  --  WDL except;urine;voiding ability/characteristics      Voiding Characteristics  --  --  voids spontaneously without difficulty      Urine Characteristics  --  --  odorous;straw colored         Skin    Skin WDL  --  --  WDL except;characteristics      Skin Temperature  --  --  warm      Skin Moisture  --  --  dry      Skin Elasticity  --  --  quick return to original state      Skin Integrity  --  --  excoriation to left forearm and left thigh         Skin Interventions    Pressure Reduction Devices   pressure-redistributing mattress utilized  --  pressure-redistributing mattress utilized      Pressure Reduction Techniques  frequent weight shift encouraged;pressure points protected  --  pressure points protected;frequent weight shift encouraged      Skin Protection  adhesive use limited;incontinence pads utilized;protective footwear used;skin-to-device areas padded;skin-to-skin areas padded;tubing/devices free from skin contact  --  adhesive use limited;incontinence pads utilized;skin-to-device areas padded;skin-to-skin areas padded;tubing/devices free from skin contact         Musculoskeletal    Musculoskeletal WDL  --  --  WDL;mobility      General Mobility  --  --  generalized weakness         Functional Screen (every 3 days/change)    Ambulation  --  --  3 - assistive equipment and person      Transferring  --  --  3 - assistive equipment and person      Toileting  --  --  3 - assistive equipment and person      Bathing  --  --  2 - assistive person      Dressing  --  --  0 - independent      Eating  --  --  0 - independent      Communication  --  --  0 - understands/communicates without difficulty      Swallowing  --  --  0 - swallows foods/liquids without difficulty         Nutrition    Diet/Nutrition Received  --  --  regular;low saturated fat/low cholesterol      Nutrition Risk Screen  --  difficulty chewing/swallowing patient reports  --         Nutrition Interventions    Glycemic Management  --  --  blood glucose monitoring         Peripheral IV 03/15/20 0400 Right;Upper Arm    IV Properties Placement Date: 03/15/20 Placement Time: 0400 Hand Hygiene Completed: Yes Size (Gauge): 20 G Orientation: Right;Upper Location: Arm Site Prep: Chlorhexidine Technique: Anatomical landmarks Inserted by: YOLANDA Lozada Total insertion attempts: 1 Patient Tolerance: Tolerated well    Site Assessment  --  --  Clean;Dry;Intact      Dressing Type  --  --  Transparent      Line Status  --  --  Flushed;Saline locked       Dressing Status  --  --  Clean;Dry;Intact      Reason Not Rotated  --  --  Not due      Phlebitis  --  --  0-->no symptoms         Adult Sepsis Screening Tool    Previous adult screen positive in last 48 hrs?  --  --  no      Are 2 or > of the above criteria present?  --  --  no: STOP/negative screen         Safety    Safety WDL  WDL  --  WDL      All Alarms  alarm(s) activated and audible  --  alarm(s) activated and audible      Safety/Security Measures  bed alarm set;chair alarm set  --  bed alarm set;chair alarm set         Safety Management    Safety Promotion/Fall Prevention  activity supervised;fall prevention program maintained;nonskid shoes/slippers when out of bed;safety round/check completed;toileting scheduled  --  activity supervised;fall prevention program maintained;nonskid shoes/slippers when out of bed;safety round/check completed;toileting scheduled      Medication Review/Management  medications reviewed;high risk medications identified  --  medications reviewed      Environmental Safety Modification  assistive device/personal items within reach;clutter free environment maintained;lighting adjusted;room organization consistent  --  assistive device/personal items within reach;clutter free environment maintained;lighting adjusted;room organization consistent         Safety Interventions    Seizure Precautions  --  --  activity supervised;emergency equipment at bedside;side rails padded         Daily Care    Activity Management  activity adjusted per tolerance;activity encouraged  --  activity adjusted per tolerance      Activity Assistance Provided  assistance, 1 person  --  assistance, 1 person      Symptoms Noted During/After Activity  fatigue  --  none      Specialty Bed/Overlay  overlay, nonpowered/static  --  overlay, nonpowered/static      Additional Documentation  --  --  Specialty Bed/Overlay (Row)         Positioning    Body Position  turned;position changed independently;side-lying, left   --  turned;position changed independently;position maintained;side-lying, right      Head of Bed (HOB)  HOB lowered;HOB at 30 degrees  --  HOB elevated;HOB at 45 degrees      Positioning/Transfer Devices  pillows;in use  --  pillows;in use

## 2020-03-16 NOTE — PLAN OF CARE
Problem: Patient Care Overview  Goal: Plan of Care Review  Outcome: Ongoing (interventions implemented as appropriate)  Flowsheets (Taken 3/16/2020 2432)  Progress: no change  Plan of Care Reviewed With: patient  Outcome Summary: PT initial evaluation completed.  Pt able to ambulate 250' without AD with CGA.  Limited by decreased balance and decreased activity tolerance.  Pt requires skilled PT services to increase endurance, strength, and balance to increase functional independence and minimize risk of falls.  Recommend home with assist at D/C.

## 2020-03-16 NOTE — PLAN OF CARE
Problem: Seizure Disorder/Epilepsy (Adult)  Description  Prevent and manage potential problems includin. antiepileptic drug side effects/toxicity  2. aspiration  3. hypoxia/hypoxemia  4. injury  5. preictal, ictal, postictal events  6. prolonged seizure/status epilepticus  7. situational response  Goal: Signs and Symptoms of Listed Potential Problems Will be Absent, Minimized or Managed (Seizure Disorder/Epilepsy)  Outcome: Ongoing (interventions implemented as appropriate) no s/s of any additional seizures and seizure  Problem: Patient Care Overview  Goal: Plan of Care Review  Outcome: Ongoing (interventions implemented as appropriate)  Note:   PT is cooperative with careGUILHERME, on oxygen @ 4 liters 97% completely desaturates into 70's when she is off, as if to go to the bathroom. Pt states she is sore from her fall, given PRN tylenol, her 13 year old son Amadeo has called twice about going to stay with his sister in [facility]. She says to let her sleep if he should call again.  Goal: Individualization and Mutuality  Outcome: Ongoing (interventions implemented as appropriate)  Goal: Discharge Needs Assessment  Outcome: Ongoing (interventions implemented as appropriate)  Goal: Interprofessional Rounds/Family Conf  Outcome: Ongoing (interventions implemented as appropriate)     Problem: Seizure Disorder/Epilepsy (Adult)  Description  Prevent and manage potential problems includin. antiepileptic drug side effects/toxicity  2. aspiration  3. hypoxia/hypoxemia  4. injury  5. preictal, ictal, postictal events  6. prolonged seizure/status epilepticus  7. situational response  Goal: Signs and Symptoms of Listed Potential Problems Will be Absent, Minimized or Managed (Seizure Disorder/Epilepsy)  Outcome: Ongoing (interventions implemented as appropriate)     Problem: Fall Risk (Adult)  Goal: Identify Related Risk Factors and Signs and Symptoms  Outcome: Ongoing (interventions implemented as appropriate)  Goal:  Absence of Fall  Outcome: Ongoing (interventions implemented as appropriate)     Problem: Skin Injury Risk (Adult)  Goal: Identify Related Risk Factors and Signs and Symptoms  Outcome: Ongoing (interventions implemented as appropriate)  Goal: Skin Health and Integrity  Outcome: Ongoing (interventions implemented as appropriate)    matts are on rails

## 2020-03-16 NOTE — PROGRESS NOTES
Discharge Planning Assessment  [facility]     Patient Name: Cristine Berkowitz  MRN: [id number]  Today's Date: 3/16/2020    Admit Date: 3/15/2020    Discharge Needs Assessment     Row Name 03/16/20 1638       Living Environment    Lives With  child(soraida), dependent    Name(s) of Who Lives With Patient  Conrado Berkowitz (son-dependent 13 yo)    Unique Family Situation  Jhon Oropeza (ex-sister in law) [phone]    Current Living Arrangements  home/apartment/condo    Primary Care Provided by  self    Provides Primary Care For  no one    Family Caregiver if Needed  other relative(s)    Family Caregiver Names  Jhno Oropeza (ex-sister in law) [phone]    Quality of Family Relationships  helpful;involved;supportive    Able to Return to Prior Arrangements  yes       Resource/Environmental Concerns    Resource/Environmental Concerns  none    Transportation Concerns  car, none       Transition Planning    Patient/Family Anticipates Transition to  home with family    Patient/Family Anticipated Services at Transition      Transportation Anticipated  family or friend will provide       Discharge Needs Assessment    Readmission Within the Last 30 Days  no previous admission in last 30 days    Concerns to be Addressed  discharge planning    Equipment Currently Used at Home  oxygen;walker, rolling;bipap/cpap;nebulizer Resp. DME with [facility]    Anticipated Changes Related to Illness  inability to care for self    Equipment Needed After Discharge  none    Provided Post Acute Provider List?  Yes    Post Acute Provider List  DME Supplier    Delivered To  Patient    Method of Delivery  In person    Patient's Choice of Community Agency(s)  [facility]        Discharge Plan     Row Name 03/16/20 9586       Plan    Plan  Home with family    Patient/Family in Agreement with Plan  yes    Plan Comments  Spoke with patient at bedside.  Patient lives in [facility] with 12 year old son.  She states that  her son is currently staying with her ex-sister in [place].  She states that she is independent at home.  She has a rolling walker, nebulizer, CPAP and 02 at 2L NC that she wears cont. at home.  She states that she has portable tanks and concentrator at home, but she did not bring a portable tank for discharge.  Patient states that she is not current with home health or services.  States that her PCP Is Afia CANTU and insurance provider is [facility].  She states that she has prescription drug coverage and she is able to afford her medications.  Plans to discharge home with family when medically ready.  Patient declines services at this time.  Will continue to follow for discharge planning.     Final Discharge Disposition Code  01 - home or self-care        Destination      Coordination has not been started for this encounter.      Durable Medical Equipment      Coordination has not been started for this encounter.      Dialysis/Infusion      Coordination has not been started for this encounter.      Home Medical Care      Coordination has not been started for this encounter.      Therapy      Coordination has not been started for this encounter.      Community Resources      Coordination has not been started for this encounter.        Expected Discharge Date and Time     Expected Discharge Date Expected Discharge Time    Mar 18, 2020         Demographic Summary     Row Name 03/16/20 1633       General Information    Admission Type  inpatient    Arrived From  home    Referral Source  admission list    Reason for Consult  discharge planning    Preferred Language  English     Used During This Interaction  no    General Information Comments  PCP:  Afia CANTU        Functional Status     Row Name 03/16/20 163       Functional Status    Usual Activity Tolerance  good    Current Activity Tolerance  good       Functional Status, IADL    Medications  independent    Meal  Preparation  independent    Housekeeping  independent    Laundry  independent    Shopping  independent        Psychosocial    No documentation.       Abuse/Neglect    No documentation.       Legal    No documentation.       Substance Abuse    No documentation.       Patient Forms    No documentation.           Milka Bartholomew RN

## 2020-03-16 NOTE — THERAPY EVALUATION
Patient Name: Cristine Berkowitz  : 1965    MRN: [id number]                              Today's Date: 3/16/2020       Admit Date: 3/15/2020    Visit Dx: No diagnosis found.  Patient Active Problem List   Diagnosis   • Acute heart failure, previous   • COPD (chronic obstructive pulmonary disease) (CMS/HCC)   • Hypertension   • Moderate obesity   • Seizure disorder (CMS/HCC)   • Pulmonary hypertension (CMS/HCC)   • Mild diastolic dysfunction   • Uncontrolled seizures (CMS/HCC)   • Smoker     Past Medical History:   Diagnosis Date   • Arthritis    • Asthma    • CHF (congestive heart failure) (CMS/HCC)    • COPD (chronic obstructive pulmonary disease) (CMS/HCC)    • Coronary artery disease    • Diabetes mellitus (CMS/HCC)    • Hypertension    • Seizures (CMS/HCC)      Past Surgical History:   Procedure Laterality Date   • HYSTERECTOMY       General Information     Row Name 20 1553          PT Evaluation Time/Intention    Document Type  evaluation  -ES     Mode of Treatment  individual therapy;physical therapy  -ES     Row Name 20 1553          General Information    Patient Profile Reviewed?  yes  -ES     Prior Level of Function  independent:;all household mobility;ADL's;gait;transfer;bed mobility  -ES     Existing Precautions/Restrictions  fall;seizures  -ES     Barriers to Rehab  medically complex  -ES     Row Name 20 1553          Relationship/Environment    Lives With  child(soraida), dependent 10 yo son Amadeo  -ES     Row Name 20 1553          Resource/Environmental Concerns    Current Living Arrangements  home/apartment/condo  -ES     Row Name 20 1553          Home Main Entrance    Number of Stairs, Main Entrance  none ramp  -ES     Row Name 20 1553          Stairs Within Home, Primary    Number of Stairs, Within Home, Primary  none  -ES     Row Name 20 155          Cognitive Assessment/Intervention- PT/OT    Orientation Status (Cognition)  oriented  to;person;time;verbal cues/prompts needed for orientation;place  -ES     Row Name 03/16/20 1553          Safety Issues, Functional Mobility    Safety Issues Affecting Function (Mobility)  awareness of need for assistance;insight into deficits/self awareness;safety precaution awareness;safety precautions follow-through/compliance  -ES     Impairments Affecting Function (Mobility)  balance;endurance/activity tolerance;shortness of breath;strength  -ES       User Key  (r) = Recorded By, (t) = Taken By, (c) = Cosigned By    Initials Name Provider Type    Kayce Rodriguez PT Physical Therapist        Mobility     Row Name 03/16/20 1554          Bed Mobility Assessment/Treatment    Bed Mobility Assessment/Treatment  supine-sit;sit-supine  -ES     Supine-Sit [place] (Bed Mobility)  supervision;verbal cues  -ES     Sit-Supine [place] (Bed Mobility)  supervision;verbal cues  -ES     Assistive Device (Bed Mobility)  head of bed elevated  -ES     Row Name 03/16/20 1554          Sit-Stand Transfer    Sit-Stand [place] (Transfers)  supervision;verbal cues  -ES     Assistive Device (Sit-Stand Transfers)  other (see comments) no AD  -ES     Row Name 03/16/20 1554          Gait/Stairs Assessment/Training    Gait/Stairs Assessment/Training  gait/ambulation independence  -ES     [place] Level (Gait)  contact guard  -ES     Assistive Device (Gait)  other (see comments) no AD  -ES     Distance in Feet (Gait)  250  -ES     Pattern (Gait)  step-through  -ES     Deviations/Abnormal Patterns (Gait)  gait speed decreased;stride length decreased  -ES     Bilateral Gait Deviations  weight shift ability decreased  -ES     [place] Level (Stairs)  not tested  -ES     Comment (Gait/Stairs)  Pt exhibits mild unsteadiness with gait  -ES       User Key  (r) = Recorded By, (t) = Taken By, (c) = Cosigned By    Initials Name Provider Type    Kayce Rodriguez PT Physical Therapist        Obj/Interventions     Row Name  03/16/20 1555          General ROM    GENERAL ROM COMMENTS  BLEs WFL  -ES     Row Name 03/16/20 1555          MMT (Manual Muscle Testing)    General MMT Comments  BLEs 4+/5  -ES     Row Name 03/16/20 1555          Static Sitting Balance    Level of [place] (Unsupported Sitting, Static Balance)  supervision  -ES     Sitting Position (Unsupported Sitting, Static Balance)  sitting on edge of bed  -ES     Time Able to Maintain Position (Unsupported Sitting, Static Balance)  1 to 2 minutes  -ES     Row Name 03/16/20 1555          Static Standing Balance    Level of [place] (Supported Standing, Static Balance)  contact guard assist  -ES     Time Able to Maintain Position (Supported Standing, Static Balance)  1 to 2 minutes  -ES     Assistive Device Utilized (Supported Standing, Static Balance)  -- no AD  -ES     Row Name 03/16/20 1555          Sensory Assessment/Intervention    Sensory General Assessment  no sensation deficits identified  -ES       User Key  (r) = Recorded By, (t) = Taken By, (c) = Cosigned By    Initials Name Provider Type    ES Kayce Wadsworth, PT Physical Therapist        Goals/Plan     Row Name 03/16/20 1558          Bed Mobility Goal 1 (PT)    Activity/Assistive Device (Bed Mobility Goal 1, PT)  bed mobility activities, all  -ES     [place] Level/Cues Needed (Bed Mobility Goal 1, PT)  independent  -ES     Time Frame (Bed Mobility Goal 1, PT)  2 weeks  -ES     Progress/Outcomes (Bed Mobility Goal 1, PT)  goal ongoing  -ES     Row Name 03/16/20 1558          Transfer Goal 1 (PT)    Activity/Assistive Device (Transfer Goal 1, PT)  sit-to-stand/stand-to-sit;bed-to-chair/chair-to-bed  -ES     [place] Level/Cues Needed (Transfer Goal 1, PT)  independent  -ES     Time Frame (Transfer Goal 1, PT)  2 weeks  -ES     Progress/Outcome (Transfer Goal 1, PT)  goal ongoing  -ES     Row Name 03/16/20 1558          Gait Training Goal 1 (PT)    Activity/Assistive Device (Gait Training Goal 1, PT)   gait (walking locomotion)  -ES     [place] Level (Gait Training Goal 1, PT)  supervision required  -ES     Distance (Gait Goal 1, PT)  500  -ES     Time Frame (Gait Training Goal 1, PT)  2 weeks  -ES     Progress/Outcome (Gait Training Goal 1, PT)  goal ongoing  -ES       User Key  (r) = Recorded By, (t) = Taken By, (c) = Cosigned By    Initials Name Provider Type    Kayce Rodriguez, PT Physical Therapist        Clinical Impression     Row Name 03/16/20 8828          Plan of Care Review    Plan of Care Reviewed With  patient  -ES     Progress  no change  -ES     Outcome Summary  PT initial evaluation completed.  Pt able to ambulate 250' without AD with CGA.  Limited by decreased balance and decreased activity tolerance.  Pt requires skilled PT services to increase endurance, strength, and balance to increase functional independence and minimize risk of falls.  Recommend home with assist at D/C.  -ES     Row Name 03/16/20 2695          Physical Therapy Clinical Impression    Patient/Family Goals Statement (PT Clinical Impression)  to return home  -ES     Criteria for Skilled Interventions Met (PT Clinical Impression)  yes;treatment indicated  -ES     Rehab Potential (PT Clinical Summary)  good, to achieve stated therapy goals  -ES     Predicted Duration of Therapy (PT)  2 weeks  -ES     Row Name 03/16/20 2818          Vital Signs    Pre Systolic BP Rehab  -- VSS, RN cleared for tx  -ES     Pre Patient Position  Supine  -ES     Intra Patient Position  Standing  -ES     Post Patient Position  Supine  -ES     Row Name 03/16/20 6429          Positioning and Restraints    Pre-Treatment Position  in bed  -ES     Post Treatment Position  bed  -ES     In Bed  notified nsg;supine;call light within reach;encouraged to call for assist;exit alarm on  -ES       User Key  (r) = Recorded By, (t) = Taken By, (c) = Cosigned By    Initials Name Provider Type    Kayce Rodriguez, PT Physical Therapist        Outcome Measures      Row Name 03/16/20 1558          How much help from another person do you currently need...    Turning from your back to your side while in flat bed without using bedrails?  4  -ES     Moving from lying on back to sitting on the side of a flat bed without bedrails?  4  -ES     Moving to and from a bed to a chair (including a wheelchair)?  3  -ES     Standing up from a chair using your arms (e.g., wheelchair, bedside chair)?  3  -ES     Climbing 3-5 steps with a railing?  3  -ES     To walk in hospital room?  3  -ES     AM-PAC 6 Clicks Score (PT)  20  -ES     Row Name 03/16/20 1558          Functional Assessment    Outcome Measure Options  AM-PAC 6 Clicks Basic Mobility (PT)  -ES       User Key  (r) = Recorded By, (t) = Taken By, (c) = Cosigned By    Initials Name Provider Type    Kayce Rodriguez, PT Physical Therapist          PT Recommendation and Plan  Planned Therapy Interventions (PT Eval): balance training, bed mobility training, gait training, home exercise program, neuromuscular re-education, patient/family education, strengthening, transfer training  Outcome Summary/Treatment Plan (PT)  Anticipated Discharge Disposition (PT): home with assist  Plan of Care Reviewed With: patient  Progress: no change  Outcome Summary: PT initial evaluation completed.  Pt able to ambulate 250' without AD with CGA.  Limited by decreased balance and decreased activity tolerance.  Pt requires skilled PT services to increase endurance, strength, and balance to increase functional independence and minimize risk of falls.  Recommend home with assist at D/C.     Time Calculation:   PT Charges     Row Name 03/16/20 1600             Time Calculation    Start Time  1520  -ES      PT Received On  03/16/20  -ES      PT Goal Re-Cert Due Date  03/26/20  -ES        User Key  (r) = Recorded By, (t) = Taken By, (c) = Cosigned By    Initials Name Provider Type    Kayce Rodriguez, PT Physical Therapist        Therapy Charges for Today      Code Description Service Date Service Provider Modifiers Qty    62901336552 HC PT EVAL LOW COMPLEXITY 4 3/16/2020 Kayce Wadsworth, PT GP 1          PT G-Codes  Outcome Measure Options: AM-PAC 6 Clicks Basic Mobility (PT)  AM-PAC 6 Clicks Score (PT): 20    Kayce Wadsworth PT  3/16/2020

## 2020-03-16 NOTE — PROGRESS NOTES
Neurology       Patient Care Team:  Afia Lopez APRN as PCP - General (Family Medicine)    Chief complaint: Breakthrough seizure    History: Long history of seizures with breakthroughs.    Dr. Ellison increased her Keppra from 750 mg twice daily to 1500 mg twice daily.    She is having no side effects.  She wide-awake  Past Medical History:   Diagnosis Date   • Arthritis    • Asthma    • CHF (congestive heart failure) (CMS/[facility])    • COPD (chronic obstructive pulmonary disease) (CMS/[facility])    • Coronary artery disease    • Diabetes mellitus (CMS/[facility])    • Hypertension    • Seizures (CMS/[facility])        Vital Signs   Vitals:    03/16/20 0700 03/16/20 0824 03/16/20 0900 03/16/20 1052   BP:  120/80  122/78   BP Location:  Left arm  Left arm   Patient Position:  Sitting  Lying   Pulse: 77 97 91 107   Resp:  18  18   Temp:  97.5 °F (36.4 °C)     TempSrc:  Oral     SpO2: 98% 93% 92% 94%   Weight:       Height:           Physical Exam:   General: Awake and alert              Neuro: Emotional but oriented to person place and time and not sleepy.    Speech is normal.    Motor testing shows  are equal.        Results Review:  Review  Results from last 7 days   Lab Units 03/15/20  0407   WBC 10*3/mm3 12.40*   HEMOGLOBIN g/dL 13.3   HEMATOCRIT % 42.3   PLATELETS 10*3/mm3 175     Results from last 7 days   Lab Units 03/15/20  0407 03/14/20  1353   SODIUM mmol/L 140 139   POTASSIUM mmol/L 3.9 3.8   CHLORIDE mmol/L 94* 92*   CO2 mmol/L 35.3* 35.1*   BUN mg/dL 9 8   CREATININE mg/dL 0.54* 0.49*   CALCIUM mg/dL 10.0 9.8   BILIRUBIN mg/dL 0.5 0.4   ALK PHOS U/L 72 78   ALT (SGPT) U/L 15 18   AST (SGOT) U/L 15 18   GLUCOSE mg/dL 119* 150*       Imaging Results (Last 24 Hours)     Procedure Component Value Units Date/Time    MRI Brain With & Without Contrast [[id number]] Collected:  03/16/20 0737     Updated:  03/16/20 0804    Narrative:       EXAMINATION: MRI BRAIN W WO CONTRAST-     INDICATION: Refractory seizure.       TECHNIQUE: Multiplanar MRI of the brain with and without intravenous  contrast.     COMPARISON: None.     FINDINGS: No restriction on diffusion-weighted sequences. Midline  structures are symmetric without evidence of mass, mass effect or  midline shift. Ventricles and sulci within normal limits. Pituitary and  sella within normal limits. Cervicomedullary junction widely patent.  Globes and orbits retain normal T2 signal characteristics. Visualized  paranasal sinuses and mastoid air cells are grossly clear and well  pneumatized.     Postcontrast sequences performed and mild to moderately motion degraded,  however, no focus of abnormal soft tissue nodular enhancement or mass  occupying focus of abnormal enhancement identified with superior  sagittal sinus patent and the [place] grossly patent and  unremarkable.     Thin section imaging through the mesiotemporal regions without abnormal  sclerosis, asymmetry or atrophy in these regions on thin section coronal  sequencing.       Impression:       Mild to moderate motion degradation, however no acute  infarction or abnormal enhancement. No significant white matter disease  signal aberration within the motion degraded exam. No mesiotemporal  sclerosis, asymmetry or abnormal atrophy of the mesiotemporal regions on  thin section coronal sequencing.      D:  03/16/2020  E:  03/16/2020     This report was finalized on 3/16/2020 8:01 AM by Dr. Eduardo Russo.             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         I discussed the patients findings and my recommendations with patient    Conrado Ascencio MD  03/16/20  16:19

## 2020-03-16 NOTE — PAYOR COMM NOTE
"Deena Connolly RN  Utilization Review  P: [phone]  F: [phone]    Member ID = [id number]  Initial notification & clinicals for inpatient auth        Andrea Gilmore (54 y.o. Female)     Date of Birth Social Security Number Address Home Phone MRN    1965  [id number] [facility] [facility]  [place] [address] [phone] [id number]    Christianity Marital Status          Cheondoism        Admission Date Admission Type Admitting Provider Attending Provider Department, Room/Bed    3/15/20 Urgent Danielle Justice MD Seward, Kathryn L, MD [facility] 3E, S338/1    Discharge Date Discharge Disposition Discharge Destination                       Attending Provider:  Danielle Justice MD    Allergies:  Amoxicillin, Other, Codeine, Latex, Rocephin [Ceftriaxone]    Isolation:  None   Infection:  None   Code Status:  CPR    Ht:  152.4 cm (60\")   Wt:  79.4 kg (175 lb)    Admission Cmt:  None   Principal Problem:  Uncontrolled seizures (CMS/HCC) [R56.9]                 Active Insurance as of 3/15/2020     Primary Coverage     Payor Plan Insurance Group Employer/Plan Group    [employer] [place] [employer] [place]ICA[place]      Payor Plan Address Payor Plan Phone Number Payor Plan Fax Number Effective Dates    [address] [address] [phone]  2016 - None Entered    [facility] [id number]       Subscriber Name Subscriber Birth Date Member ID       ANDREA GILMORE 1965 [id number]                 Emergency Contacts      (Rel.) Home Phone Work Phone Mobile Phone    Jhon Oropeza (Other) [phone] -- --    MANDO BENITEZ (Son) -- -- [phone]               History & Physical      Callie Lord MD at 03/15/20 1203              [facility] Medicine Services  HISTORY AND PHYSICAL    Patient Name: Andrea Gilmore  : 1965  MRN: [id number]  Primary Care Physician: Afia Lopez, ALONDRA  Date of admission: 3/15/2020      Subjective   Subjective     Chief " "Complaint:  Uncontrolled seizures    HPI:  Cristine Berkowitz is a 54 y.o. female with PMHX of HTN, diet controlled DM2 (last a1c on chart 2018 6.5%), COPD, diastolic CHF with Pulm HTN based on ECHO from 2018 and takes daily Lasix, and known seizure disorder.  Presented to [facility] ED initially on 3/14/20 after a fall with confusion and HA, ultimately had a repeated witnessed seizure in [facility] ED mitigated by ativan ultimately returning to her normal cognitive baseline.  Although patient denied noncompliance with her Keppra, family had concerns she was not taking it properly/consistently.  She was d/c'd from [facility] ED into care of family, then returned again 3/15/20 by EMS after having another tonic-clonic seizure witnessed by family with profound lethargy and confusion after above the level of her typical post-ictal spells. Work ups in [facility] ED had CT head neg x2 as well as no evidence of underlying infectious process.    Upon my eval after arrival to floor, patient is alert and uncomfortable in the bed.  \"My back hurts, I'm so sore\".  Confused but will try to answer simple questions.     Review of Systems   Unable to reliably obtain due to confusion  All other systems reviewed and are negative.     Personal History     Past Medical History:   Diagnosis Date   • Arthritis    • Asthma    • CHF (congestive heart failure) (CMS/HCC)    • COPD (chronic obstructive pulmonary disease) (CMS/HCC)    • Coronary artery disease    • Diabetes mellitus (CMS/HCC)    • Hypertension    • Seizures (CMS/HCC)        Past Surgical History:   Procedure Laterality Date   • HYSTERECTOMY         Family History: family history includes Breast cancer in her paternal aunt; Heart disease in her brother, father, mother, and sister. Otherwise pertinent FHx was reviewed and unremarkable.     Social History:  reports that she has been smoking cigarettes. She has a 3.75 pack-year smoking history. She has never used smokeless tobacco. Drug use questions deferred " to the physician. She reports that she does not drink alcohol.  Social History     Social History Narrative   • Not on file       Medications:  Available home medication information reviewed.  Medications Prior to Admission   Medication Sig Dispense Refill Last Dose   • albuterol (PROVENTIL HFA;VENTOLIN HFA) 108 (90 Base) MCG/ACT inhaler Inhale 2 puffs Every 4 (Four) Hours As Needed for Wheezing.   Taking   • furosemide (LASIX) 20 MG tablet Take 20 mg by mouth 2 (Two) Times a Day.      • levETIRAcetam (KEPPRA) 750 MG tablet Take 750 mg by mouth 2 (Two) Times a Day.      • O2 (OXYGEN) Inhale Every Night.   Taking   • roflumilast (DALIRESP) 500 MCG tablet tablet Take  by mouth Daily.          Allergies   Allergen Reactions   • Amoxicillin Anaphylaxis   • Other Itching     STEROIDS   • Codeine Hives   • Latex Itching   • Rocephin [Ceftriaxone] Itching       Objective   Objective     Vital Signs:   Temp:  [97.8 °F (36.6 °C)-99.6 °F (37.6 °C)] 98.5 °F (36.9 °C)  Heart Rate:  [] 92  Resp:  [18] 18  BP: (104-158)/(40-90) 157/85        Physical Exam   Constitutional: No acute distress, awake, alert, nontoxic, overweight body habitus  Eyes: No nystagmus  HENT: NCAT  Respiratory: Clear to auscultation bilaterally, good effort, nonlabored respirations   Cardiovascular: RRR  Musculoskeletal: No peripheral edema, normal muscle tone for age  Psychiatric: encephalopathic, interactive but confused  Neurologic: Movements symmetric BUE and BLE, Cranial Nerves grossly intact, speech slow  Skin: No visable bruises      Results Reviewed:  I have personally reviewed current lab and radiology data.    Results from last 7 days   Lab Units 03/15/20  0407   WBC 10*3/mm3 12.40*   HEMOGLOBIN g/dL 13.3   HEMATOCRIT % 42.3   PLATELETS 10*3/mm3 175     Results from last 7 days   Lab Units 03/15/20  0407 03/14/20  1632 03/14/20  1353   SODIUM mmol/L 140  --  139   POTASSIUM mmol/L 3.9  --  3.8   CHLORIDE mmol/L 94*  --  92*   CO2 mmol/L  35.3*  --  35.1*   BUN mg/dL 9  --  8   CREATININE mg/dL 0.54*  --  0.49*   GLUCOSE mg/dL 119*  --  150*   CALCIUM mg/dL 10.0  --  9.8   ALT (SGPT) U/L 15  --  18   AST (SGOT) U/L 15  --  18   TROPONIN T ng/mL <0.010 <0.010 <0.010   LACTATE mmol/L 1.8  --   --      Estimated Creatinine Clearance: 113.7 mL/min (A) (by C-G formula based on SCr of 0.54 mg/dL (L)).  Brief Urine Lab Results  (Last result in the past 365 days)      Color   Clarity   Blood   Leuk Est   Nitrite   Protein   CREAT   Urine HCG        03/15/20 0415 Yellow Clear Trace Negative Negative Trace             Imaging Results (Last 24 Hours)     ** No results found for the last 24 hours. **        Results for orders placed in visit on 04/02/19   SCANNED - ECHOCARDIOGRAM       Assessment/Plan   Assessment & Plan     Active Hospital Problems    Diagnosis POA   • **Uncontrolled seizures (CMS/[facility]) [R56.9] Yes   • Seizure disorder (CMS/[facility]) [G40.909] Yes   • Pulmonary hypertension (CMS/[facility]) [I27.20] Yes   • Mild diastolic dysfunction [I51.9] Yes   • Smoker [F17.200] Yes   • Moderate obesity [E66.8] Yes   • COPD (chronic obstructive pulmonary disease) (CMS/[facility]) [J44.9] Yes   • Hypertension [I10] Yes       Uncontrolled seizures with known seizure disorder  - given 1g IV Keppra ~0400 at [facility]  - 1,500mg IV BID to start at 1600   - Neuro consulted for evaluation and recc's  - check CPK    Pulm HTN  Diastolic dysfunction   HTN  Presumed diet controlled DM2 (diabetes listed in PMHx but on no medications)  COPD  - hold home Lasix for now, euvolemic and has not taken much PO in past 24hrs due to post-ictal states  - trend BP's, use prns for now  - continue home Daliresp, prn nebs  - nicotine preplacement  - last a1c 6.5% in 2018 --> check A1c    DVT prophylaxis:  Lovenox    CODE STATUS:    Code Status and Medical Interventions:   Ordered at: 03/15/20 1202     Code Status:    CPR     Medical Interventions (Level of Support Prior to Arrest):    Full       Admission  Status:  I believe this patient meets INPATIENT status due to failed ED visits x2 in 24hrs to control seizures and need for Neurology evaluation for adjustment of medications urgently with ongoing seizure precautions.  I feel patient’s risk for adverse outcomes and need for care warrant INPATIENT evaluation and I predict the patient’s care encounter to likely last beyond 2 midnights.      Electronically signed by Callie Lord MD, 03/15/20, 12:05 PM.      Electronically signed by Callie Lord MD at 03/15/20 1314       Vital Signs (last 7 days)     Date/Time   Temp   Temp src   Pulse   Resp   BP   Patient Position   SpO2    03/16/20 0900   --   --   91   --   --   --   92    03/16/20 0824   97.5 (36.4)   Oral   97   18   120/80   Sitting   93    03/16/20 0700   --   --   77   --   --   --   98    03/16/20 0404   97.4 (36.3)   Oral   89   18   131/90   --   96    03/15/20 1900   98.3 (36.8)   Oral   82   18   148/72   Lying   91    03/15/20 1809   --   --   84   --   --   --   95    03/15/20 1529   98.3 (36.8)   Oral   87   16   143/62   Lying   94    03/15/20 1420   --   --   83   --   --   --   --    03/15/20 1147   98.5 (36.9)   Oral   92   18   157/85   Lying   90                Facility-Administered Medications as of 3/16/2020   Medication Dose Route Frequency Provider Last Rate Last Dose   • [COMPLETED] acetaminophen (TYLENOL) suppository 650 mg  650 mg Rectal Once Modesta Conde APRN   650 mg at 03/15/20 0416   • acetaminophen (TYLENOL) tablet 650 mg  650 mg Oral Q4H PRN Callie Lord MD   650 mg at 03/15/20 2112   • bisacodyl (DULCOLAX) EC tablet 5 mg  5 mg Oral Daily PRN Callie Lord MD       • calcium carbonate EX (TUMS EX) chewable tablet 750 mg  750 mg Oral BID PRN Callie Lord MD       • diphenhydrAMINE (BENADRYL) capsule 25 mg  25 mg Oral Nightly PRN Callie Lord MD   25 mg at 03/15/20 2112   • enoxaparin (LOVENOX) syringe 40 mg  40 mg Subcutaneous Q24H Callie Lord MD   40 mg at 03/15/20 7407    • [COMPLETED] gadobenate dimeglumine (MULTIHANCE) injection 15 mL  15 mL Intravenous Once in imaging Callie Lord MD   15 mL at 03/15/20 1730   • ipratropium-albuterol (DUO-NEB) nebulizer solution 3 mL  3 mL Nebulization Q6H PRN Callie Lord MD       • [COMPLETED] levETIRAcetam (KEPPRA) 1,000 mg in sodium chloride 0.9 % 100 mL IVPB  1,000 mg Intravenous Once Modesta Conde APRN   Stopped at 03/15/20 0431   • levETIRAcetam in NaCl 0.54% (KEPPRA) IVPB 1,500 mg  1,500 mg Intravenous Q12H Jaime Ellison MD   1,500 mg at 03/16/20 0828   • LORazepam (ATIVAN) injection 1 mg  1 mg Intravenous Q4H PRN Callie Lord MD       • nicotine (NICODERM CQ) 21 MG/24HR patch 1 patch  1 patch Transdermal Q24H Callie Lord MD       • ondansetron (ZOFRAN) injection 4 mg  4 mg Intravenous Q6H PRN Callie Lord MD       • roflumilast (DALIRESP) tablet 500 mcg  500 mcg Oral Daily Callie Lord MD   500 mcg at 03/16/20 0828   • sodium chloride 0.9 % flush 10 mL  10 mL Intravenous Q12H Callie Lord MD   10 mL at 03/16/20 0829   • sodium chloride 0.9 % flush 10 mL  10 mL Intravenous PRN Callie Lord MD         Lab Results (all)     Procedure Component Value Units Date/Time    CK [[id number]]  (Normal) Collected:  03/15/20 1329    Specimen:  Blood Updated:  03/15/20 1402     Creatine Kinase 37 U/L     Hemoglobin A1c [[id number]]  (Normal) Collected:  03/15/20 1212    Specimen:  Blood Updated:  03/15/20 1347     Hemoglobin A1C 5.60 %     Narrative:       Hemoglobin A1C Ranges:    Increased Risk for Diabetes  5.7% to 6.4%  Diabetes                     >= 6.5%  Diabetic Goal                < 7.0%    Levetiracetam Level (Keppra) [[id number]] Collected:  03/15/20 1212    Specimen:  Blood Updated:  03/15/20 1221          Imaging Results (All)     Procedure Component Value Units Date/Time    MRI Brain With & Without Contrast [[id number]] Collected:  03/16/20 0737     Updated:  03/16/20 0804    Narrative:       EXAMINATION: MRI BRAIN W WO  CONTRAST-     INDICATION: Refractory seizure.      TECHNIQUE: Multiplanar MRI of the brain with and without intravenous  contrast.     COMPARISON: None.     FINDINGS: No restriction on diffusion-weighted sequences. Midline  structures are symmetric without evidence of mass, mass effect or  midline shift. Ventricles and sulci within normal limits. Pituitary and  sella within normal limits. Cervicomedullary junction widely patent.  Globes and orbits retain normal T2 signal characteristics. Visualized  paranasal sinuses and mastoid air cells are grossly clear and well  pneumatized.     Postcontrast sequences performed and mild to moderately motion degraded,  however, no focus of abnormal soft tissue nodular enhancement or mass  occupying focus of abnormal enhancement identified with superior  sagittal sinus patent and the [place] grossly patent and  unremarkable.     Thin section imaging through the mesiotemporal regions without abnormal  sclerosis, asymmetry or atrophy in these regions on thin section coronal  sequencing.       Impression:       Mild to moderate motion degradation, however no acute  infarction or abnormal enhancement. No significant white matter disease  signal aberration within the motion degraded exam. No mesiotemporal  sclerosis, asymmetry or abnormal atrophy of the mesiotemporal regions on  thin section coronal sequencing.      D:  03/16/2020  E:  03/16/2020     This report was finalized on 3/16/2020 8:01 AM by Dr. Eduardo Russo.             ECG/EMG Results (all)     None          Physician Progress Notes (all)    No notes of this type exist for this encounter.            Consult Notes (all)      Jaime Ellison MD at 03/15/20 1200      Consult Orders    1. Inpatient Neurology Consult General [[id number]] ordered by Callie Lord MD at 03/15/20 1201                Inpatient Neurology Consult General  Consult performed by: Jaime Ellison MD  Consult ordered by: Callie Lord MD  Reason  for consult: seizures          Patient Care Team:  Afia Lopez APRN as PCP - General (Family Medicine)    Chief complaint: seizures      Subjective     History of Present Illness    54 y.o. female referred by Dr Lord for seizures.  Pt presented to [facility] ED 3/14/20 after a fall and HA.  Pt had episode in ED of left gaze fixation, eye blinking and right hand tremor with decrease responsiveness.  Spell lasted for 2 - 3 minutes.  Post ictal for 10 - 15 minutes.  Then had GTC Sz for one minutes.  Treated with ativan.  PMH of sz on Keppra.  Loaded with Keppra IV.  Discharged home.    3/15/20 presented to [facility] ED with 2 - 3 sz today without return to baseline.      HCT, my review of films, 3/14/20; 3/15/20, NCS  UDS - negative   U/A - neg  ABG 7.402/62.8/67.4    Pt awake but confused to details of today.  Reviewed past records multiple admissions for uncontrolled sz.     Review of Systems   Constitutional: Negative for activity change, fatigue and unexpected weight change.   HENT: Negative for tinnitus and trouble swallowing.    Eyes: Negative for photophobia and visual disturbance.   Respiratory: Negative for apnea, cough and choking.    Cardiovascular: Negative for leg swelling.   Gastrointestinal: Negative for nausea and vomiting.   Endocrine: Negative for cold intolerance and heat intolerance.   Genitourinary: Negative for difficulty urinating, frequency, menstrual problem and urgency.   Musculoskeletal: Negative for back pain, gait problem, myalgias and neck pain.   Skin: Negative for color change and rash.   Allergic/Immunologic: Negative for immunocompromised state.   Neurological: Positive for seizures and headaches. Negative for dizziness, tremors, syncope, facial asymmetry, speech difficulty, weakness, light-headedness and numbness.   Hematological: Negative for adenopathy. Does not bruise/bleed easily.   Psychiatric/Behavioral: Positive for confusion and decreased concentration. Negative for behavioral  problems, hallucinations and sleep disturbance.        Past Medical History:   Diagnosis Date   • Arthritis    • Asthma    • CHF (congestive heart failure) (CMS/[facility])    • COPD (chronic obstructive pulmonary disease) (CMS/[facility])    • Coronary artery disease    • Diabetes mellitus (CMS/[facility])    • Hypertension    • Seizures (CMS/[facility])    ,   Past Surgical History:   Procedure Laterality Date   • HYSTERECTOMY     ,   Family History   Problem Relation Age of Onset   • Breast cancer Paternal Aunt    • Heart disease Mother    • Heart disease Father    • Heart disease Sister    • Heart disease Brother    ,   Social History     Tobacco Use   • Smoking status: Current Every Day Smoker     Packs/day: 0.25     Years: 15.00     Pack years: 3.75     Types: Cigarettes   • Smokeless tobacco: Never Used   Substance Use Topics   • Alcohol use: No   • Drug use: Defer   ,   Medications Prior to Admission   Medication Sig Dispense Refill Last Dose   • albuterol (PROVENTIL HFA;VENTOLIN HFA) 108 (90 Base) MCG/ACT inhaler Inhale 2 puffs Every 4 (Four) Hours As Needed for Wheezing.   Taking   • furosemide (LASIX) 20 MG tablet Take 20 mg by mouth 2 (Two) Times a Day.      • levETIRAcetam (KEPPRA) 750 MG tablet Take 750 mg by mouth 2 (Two) Times a Day.      • O2 (OXYGEN) Inhale Every Night.   Taking   • roflumilast (DALIRESP) 500 MCG tablet tablet Take  by mouth Daily.      , Scheduled Meds:  , Continuous Infusions:    No current facility-administered medications for this encounter. , PRN Meds:   and Allergies:  Amoxicillin; Other; Codeine; Latex; and Rocephin [ceftriaxone]    Objective      Vital Signs  Temp:  [97.8 °F (36.6 °C)-99.6 °F (37.6 °C)] 98.5 °F (36.9 °C)  Heart Rate:  [] 92  Resp:  [18] 18  BP: (104-243)/() 157/85    Neurologic Exam     Mental Status   Oriented to person.   Disoriented to place. Oriented to city.   Disoriented to time.   Follows 2 step commands.   Attention: decreased. Concentration: decreased.   Speech:  speech is normal   Level of consciousness: alert  Knowledge: poor.   Able to name object. Able to repeat. Abnormal comprehension.     Cranial Nerves     CN II   Visual fields full to confrontation.   Visual acuity: normal  Right visual field deficit: none  Left visual field deficit: none     CN III, IV, VI   Pupils are equal, round, and reactive to light.  Extraocular motions are normal.   Right pupil: Shape: regular. Reactivity: brisk. Consensual response: intact.   Left pupil: Shape: regular. Reactivity: brisk. Consensual response: intact.   Nystagmus: none   Diplopia: none  Ophthalmoparesis: none  Upgaze: normal  Downgaze: normal  Conjugate gaze: present  Vestibulo-ocular reflex: present    CN V   Facial sensation intact.   Right corneal reflex: normal  Left corneal reflex: normal    CN VII   Right facial weakness: none  Left facial weakness: none    CN VIII   Hearing: intact    CN IX, X   Palate: symmetric  Right gag reflex: normal  Left gag reflex: normal    CN XI   Right sternocleidomastoid strength: normal  Left sternocleidomastoid strength: normal    CN XII   Tongue: not atrophic  Fasciculations: absent  Tongue deviation: none    Motor Exam   Muscle bulk: normal  Overall muscle tone: normal  Right arm tone: normal  Left arm tone: normal  Right leg tone: normal  Left leg tone: normal    Strength   Strength 5/5 throughout.     Sensory Exam   Light touch normal.   Vibration normal.   Proprioception normal.   Pinprick normal.     Gait, Coordination, and Reflexes     Gait  Gait: normal    Coordination   Romberg: negative  Finger to nose coordination: normal  Heel to shin coordination: normal  Tandem walking coordination: normal    Tremor   Resting tremor: absent  Intention tremor: absent  Action tremor: absent    Reflexes   Reflexes 2+ except as noted.         Physical Exam   Constitutional: Vital signs are normal. She appears well-developed and well-nourished.   HENT:   Head: Normocephalic and atraumatic.    Eyes: Pupils are equal, round, and reactive to light. EOM and lids are normal.   Fundoscopic exam:       The right eye shows no exudate, no hemorrhage and no papilledema. The right eye shows venous pulsations.        The left eye shows no exudate, no hemorrhage and no papilledema. The left eye shows venous pulsations.   Neck: Normal range of motion and phonation normal. Normal carotid pulses present. Carotid bruit is not present. No thyroid mass and no thyromegaly present.   Cardiovascular: Normal rate, regular rhythm and normal heart sounds.   Pulmonary/Chest: Effort normal.   Neurological: She has normal strength. She has a normal Finger-Nose-Finger Test, a normal Heel to Shin Test, a normal Romberg Test and a normal Tandem Gait Test. Gait normal.   Skin: Skin is warm and dry.   Psychiatric: She has a normal mood and affect. Her speech is normal.   Nursing note and vitals reviewed.      Results Review:    I reviewed the patient's new clinical results.  I reviewed the patient's new imaging results and agree with the interpretation.  I reviewed the patient's other test results and agree with the interpretation  Discussed with Dr Lord         Assessment/Plan       Uncontrolled seizures (CMS/HCC)    COPD (chronic obstructive pulmonary disease) (CMS/HCC)    Hypertension    Moderate obesity    Seizure disorder (CMS/HCC)    Pulmonary hypertension (CMS/HCC)    Mild diastolic dysfunction     1.  Seizure - pt appears post ictal.  EEG, MRI Brain, increase Keppra 1500 mg BID.  Multiple admissions for breakthrough sz in past year.        Jaime Ellison MD  03/15/20  12:00       Electronically signed by Jaime Ellison MD at 03/15/20 1120

## 2020-03-16 NOTE — PROGRESS NOTES
[facility] Medicine Services  PROGRESS NOTE    Patient Name: Cristine Berkowitz  : 1965  MRN: [id number]    Date of Admission: 3/15/2020  Primary Care Physician: Afia Lopez APRN    Subjective   Subjective     CC:  Seizures    HPI:  Patient without any seizure activity overnight.  She is drowsy this morning.  Has a headache and feels sore all over from her seizure yesterday.    Review of Systems  General: denies fevers or chills  CV: denies chest pain  Resp: denies shortness of breath  Abd: denies abd pain, nausea      Objective   Objective     Vital Signs:   Temp:  [97.4 °F (36.3 °C)-98.3 °F (36.8 °C)] 97.5 °F (36.4 °C)  Heart Rate:  [] 107  Resp:  [16-18] 18  BP: (120-148)/(62-90) 122/78        Physical Exam:  Constitutional: Awake and alert, drowsy but interactive and appropriate  Respiratory: Clear to auscultation bilaterally, respiratory effort normal   Cardiovascular: RRR, no murmurs, rubs, or gallops, palpable pedal pulses bilaterally  Gastrointestinal: Positive bowel sounds, soft, nontender, nondistended  Musculoskeletal: No bilateral ankle edema  Psychiatric: Drowsy, appropriate affect and cooperative  Neurologic: Oriented x 3, no focal deficits   Skin: No rashes    Results Reviewed:  Results from last 7 days   Lab Units 03/15/20  0407 20  1353   WBC 10*3/mm3 12.40* 11.59*   HEMOGLOBIN g/dL 13.3 13.9   HEMATOCRIT % 42.3 45.1   PLATELETS 10*3/mm3 175 172     Results from last 7 days   Lab Units 03/15/20  0407 20  1632 20  1353   SODIUM mmol/L 140  --  139   POTASSIUM mmol/L 3.9  --  3.8   CHLORIDE mmol/L 94*  --  92*   CO2 mmol/L 35.3*  --  35.1*   BUN mg/dL 9  --  8   CREATININE mg/dL 0.54*  --  0.49*   GLUCOSE mg/dL 119*  --  150*   CALCIUM mg/dL 10.0  --  9.8   ALT (SGPT) U/L 15  --  18   AST (SGOT) U/L 15  --  18   TROPONIN T ng/mL <0.010 <0.010 <0.010     Estimated Creatinine Clearance: 113.7 mL/min (A) (by C-G formula based on SCr of  0.54 mg/dL (L)).    Microbiology Results Abnormal     None          Imaging Results (Last 24 Hours)     Procedure Component Value Units Date/Time    MRI Brain With & Without Contrast [[id number]] Collected:  03/16/20 0737     Updated:  03/16/20 0804    Narrative:       EXAMINATION: MRI BRAIN W WO CONTRAST-     INDICATION: Refractory seizure.      TECHNIQUE: Multiplanar MRI of the brain with and without intravenous  contrast.     COMPARISON: None.     FINDINGS: No restriction on diffusion-weighted sequences. Midline  structures are symmetric without evidence of mass, mass effect or  midline shift. Ventricles and sulci within normal limits. Pituitary and  sella within normal limits. Cervicomedullary junction widely patent.  Globes and orbits retain normal T2 signal characteristics. Visualized  paranasal sinuses and mastoid air cells are grossly clear and well  pneumatized.     Postcontrast sequences performed and mild to moderately motion degraded,  however, no focus of abnormal soft tissue nodular enhancement or mass  occupying focus of abnormal enhancement identified with superior  sagittal sinus patent and the [place] grossly patent and  unremarkable.     Thin section imaging through the mesiotemporal regions without abnormal  sclerosis, asymmetry or atrophy in these regions on thin section coronal  sequencing.       Impression:       Mild to moderate motion degradation, however no acute  infarction or abnormal enhancement. No significant white matter disease  signal aberration within the motion degraded exam. No mesiotemporal  sclerosis, asymmetry or abnormal atrophy of the mesiotemporal regions on  thin section coronal sequencing.      D:  03/16/2020  E:  03/16/2020     This report was finalized on 3/16/2020 8:01 AM by Dr. Eduardo Russo.             Results for orders placed in visit on 04/02/19   SCANNED - ECHOCARDIOGRAM       I have reviewed the medications:  Scheduled Meds:  enoxaparin 40 mg  Subcutaneous Q24H   levETIRAcetam 1,500 mg Intravenous Q12H   nicotine 1 patch Transdermal Q24H   roflumilast 500 mcg Oral Daily   sodium chloride 10 mL Intravenous Q12H     Continuous Infusions:   PRN Meds:.•  acetaminophen  •  bisacodyl  •  calcium carbonate EX  •  diphenhydrAMINE  •  ipratropium-albuterol  •  LORazepam  •  ondansetron  •  sodium chloride    Assessment/Plan   Assessment & Plan     Active Hospital Problems    Diagnosis  POA   • **Uncontrolled seizures (CMS/[facility]) [R56.9]  Yes   • Seizure disorder (CMS/HCC) [G40.909]  Yes   • Pulmonary hypertension (CMS/[facility]) [I27.20]  Yes   • Mild diastolic dysfunction [I51.9]  Yes   • Smoker [F17.200]  Yes   • Moderate obesity [E66.8]  Yes   • COPD (chronic obstructive pulmonary disease) (CMS/[facility]) [J44.9]  Yes   • Hypertension [I10]  Yes      Resolved Hospital Problems   No resolved problems to display.        Brief Hospital Course to date:  Uncontrolled seizures with known seizure disorder  -Noticed seizure activity overnight.  - 1,500mg IV BID to start at 1600   -Neuro consulted.  Increase Keppra from 1500 mg twice daily.  -EEG negative for seizure activity  -MRI negative for acute change     Pulm HTN  Diastolic dysfunction   - hold home Lasix for now, euvolemic and has not taken much PO in past 24hrs due to post-ictal states    COPD  - continue home Daliresp, prn nebs    Tobacco abuse  - nicotine replacement    Presumed diet controlled DM2 (diabetes listed in PMHx but on no medications)  - last a1c 6.5% in 2018 --> check A1c     DVT prophylaxis:  Lovenox       Disposition: I expect the patient to be discharged 1 to 2 days.    CODE STATUS:   Code Status and Medical Interventions:   Ordered at: 03/15/20 1202     Code Status:    CPR     Medical Interventions (Level of Support Prior to Arrest):    Full         Electronically signed by Danielle Justice MD, 03/16/20, 12:09.

## 2020-03-17 VITALS
HEIGHT: 60 IN | RESPIRATION RATE: 18 BRPM | HEART RATE: 115 BPM | BODY MASS INDEX: 34.36 KG/M2 | DIASTOLIC BLOOD PRESSURE: 87 MMHG | SYSTOLIC BLOOD PRESSURE: 149 MMHG | WEIGHT: 175 LBS | OXYGEN SATURATION: 96 % | TEMPERATURE: 98.5 F

## 2020-03-17 PROBLEM — R56.9 CONVULSIONS: Status: ACTIVE | Noted: 2020-03-17

## 2020-03-17 PROCEDURE — 96374 THER/PROPH/DIAG INJ IV PUSH: CPT

## 2020-03-17 PROCEDURE — G0378 HOSPITAL OBSERVATION PER HR: HCPCS

## 2020-03-17 PROCEDURE — 25010000003 LEVETIRACETAM IN NACL 0.54% 1500 MG/100ML SOLUTION: Performed by: PSYCHIATRY & NEUROLOGY

## 2020-03-17 PROCEDURE — 96375 TX/PRO/DX INJ NEW DRUG ADDON: CPT

## 2020-03-17 PROCEDURE — 97165 OT EVAL LOW COMPLEX 30 MIN: CPT

## 2020-03-17 PROCEDURE — 97530 THERAPEUTIC ACTIVITIES: CPT

## 2020-03-17 PROCEDURE — 99217 PR OBSERVATION CARE DISCHARGE MANAGEMENT: CPT | Performed by: INTERNAL MEDICINE

## 2020-03-17 PROCEDURE — 25010000002 LORAZEPAM PER 2 MG: Performed by: FAMILY MEDICINE

## 2020-03-17 RX ORDER — LEVETIRACETAM 1000 MG/1
1500 TABLET ORAL 2 TIMES DAILY
Qty: 30 TABLET | Refills: 1 | Status: SHIPPED | OUTPATIENT
Start: 2020-03-17

## 2020-03-17 RX ADMIN — ROFLUMILAST 500 MCG: 500 TABLET ORAL at 09:23

## 2020-03-17 RX ADMIN — LORAZEPAM 1 MG: 2 INJECTION INTRAMUSCULAR; INTRAVENOUS at 09:29

## 2020-03-17 RX ADMIN — LEVETIRACETAM INJECTION 1500 MG: 15 INJECTION INTRAVENOUS at 09:23

## 2020-03-17 RX ADMIN — SODIUM CHLORIDE, PRESERVATIVE FREE 10 ML: 5 INJECTION INTRAVENOUS at 09:23

## 2020-03-17 NOTE — DISCHARGE SUMMARY
[facility] Medicine Services  DISCHARGE SUMMARY    Patient Name: Cristine Berkowitz  : 1965  MRN: [id number]    Date of Admission: 3/15/2020 11:32 AM  Date of Discharge: 3/17/2020  Primary Care Physician: Afia Lopez APRN    Consults     Date and Time Order Name Status Description    3/15/2020 1202 Inpatient Neurology Consult General Completed           Hospital Course     Presenting Problem:   Uncontrolled seizures (CMS/HCC) [R56.9]  Convulsions (CMS/HCC) [R56.9]    Active Hospital Problems    Diagnosis  POA   • **Uncontrolled seizures (CMS/HCC) [R56.9]  Yes     Priority: High   • Convulsions (CMS/HCC) [R56.9]  Yes   • Seizure disorder (CMS/HCC) [G40.909]  Yes   • Pulmonary hypertension (CMS/HCC) [I27.20]  Yes   • Mild diastolic dysfunction [I51.9]  Yes   • Smoker [F17.200]  Yes   • Moderate obesity [E66.8]  Yes   • COPD (chronic obstructive pulmonary disease) (CMS/HCC) [J44.9]  Yes   • Hypertension [I10]  Yes      Resolved Hospital Problems   No resolved problems to display.          Hospital Course:  Cristine Berkowitz is a 54 y.o. female with PMHX of HTN, diet controlled DM2 (last a1c on chart 2018 6.5%), COPD, diastolic CHF with Pulm HTN based on ECHO from 2018 and takes daily Lasix, and known seizure disorder.    She was seen in [facility] ED with a head CT that was negative.  She had a significant postictal state on presentation.  EEG obtained and is negative for seizure activity.  MRI brain was negative for acute etiology.  Keppra was increased to 1500 mg twice daily.  She had no further seizure activity during her hospitalization.  She was discharged on 50 mg twice daily.  We will also arrange for a 6-week follow-up with neurology on discharge.      Discharge Follow Up Recommendations for outpatient labs/diagnostics:  Referral to neurology in 6 weeks for follow-up.  PCP follow-up in 1 week    Day of Discharge     HPI:   Patient is doing well this morning.   States that her head hurts but is only take any medication for it ready to go home.  No seizure activity overnight.    Review of Systems  General: denies fevers or chills  CV: denies chest pain  Resp: denies shortness of breath  Abd: denies abd pain, nausea    Vital Signs:   Temp:  [98 °F (36.7 °C)-98.7 °F (37.1 °C)] 98.5 °F (36.9 °C)  Heart Rate:  [] 115  Resp:  [18-20] 18  BP: (132-163)/(68-91) 149/87     Physical Exam:  Constitutional: No acute distress, awake, alert  Respiratory: Clear to auscultation bilaterally, respiratory effort normal   Cardiovascular: RRR, no murmurs, rubs, or gallops, palpable pedal pulses bilaterally  Gastrointestinal: Positive bowel sounds, soft, nontender, nondistended  Psychiatric: Labile affect and tearful at times, cooperative  Neurologic: Oriented x 3, strength symmetric in all extremities, Cranial Nerves grossly intact to confrontation, speech clear  Skin: No rashes    Pertinent  and/or Most Recent Results     Results from last 7 days   Lab Units 03/15/20  0407 03/14/20  1353   WBC 10*3/mm3 12.40* 11.59*   HEMOGLOBIN g/dL 13.3 13.9   HEMATOCRIT % 42.3 45.1   PLATELETS 10*3/mm3 175 172   SODIUM mmol/L 140 139   POTASSIUM mmol/L 3.9 3.8   CHLORIDE mmol/L 94* 92*   CO2 mmol/L 35.3* 35.1*   BUN mg/dL 9 8   CREATININE mg/dL 0.54* 0.49*   GLUCOSE mg/dL 119* 150*   CALCIUM mg/dL 10.0 9.8     Results from last 7 days   Lab Units 03/15/20  0407 03/14/20  1353   BILIRUBIN mg/dL 0.5 0.4   ALK PHOS U/L 72 78   ALT (SGPT) U/L 15 18   AST (SGOT) U/L 15 18           Invalid input(s): TG, LDLCALC, LDLREALC  Results from last 7 days   Lab Units 03/15/20  1212 03/15/20  0407 03/14/20  1632 03/14/20  1353   HEMOGLOBIN A1C % 5.60  --   --   --    TROPONIN T ng/mL  --  <0.010 <0.010 <0.010   LACTATE mmol/L  --  1.8  --   --        Brief Urine Lab Results  (Last result in the past 365 days)      Color   Clarity   Blood   Leuk Est   Nitrite   Protein   CREAT   Urine HCG        03/15/20 6970  Yellow Clear Trace Negative Negative Trace               Microbiology Results Abnormal     None          Imaging Results (All)     Procedure Component Value Units Date/Time    MRI Brain With & Without Contrast [[id number]] Collected:  03/16/20 0737     Updated:  03/16/20 0804    Narrative:       EXAMINATION: MRI BRAIN W WO CONTRAST-     INDICATION: Refractory seizure.      TECHNIQUE: Multiplanar MRI of the brain with and without intravenous  contrast.     COMPARISON: None.     FINDINGS: No restriction on diffusion-weighted sequences. Midline  structures are symmetric without evidence of mass, mass effect or  midline shift. Ventricles and sulci within normal limits. Pituitary and  sella within normal limits. Cervicomedullary junction widely patent.  Globes and orbits retain normal T2 signal characteristics. Visualized  paranasal sinuses and mastoid air cells are grossly clear and well  pneumatized.     Postcontrast sequences performed and mild to moderately motion degraded,  however, no focus of abnormal soft tissue nodular enhancement or mass  occupying focus of abnormal enhancement identified with superior  sagittal sinus patent and the [place] grossly patent and  unremarkable.     Thin section imaging through the mesiotemporal regions without abnormal  sclerosis, asymmetry or atrophy in these regions on thin section coronal  sequencing.       Impression:       Mild to moderate motion degradation, however no acute  infarction or abnormal enhancement. No significant white matter disease  signal aberration within the motion degraded exam. No mesiotemporal  sclerosis, asymmetry or abnormal atrophy of the mesiotemporal regions on  thin section coronal sequencing.      D:  03/16/2020  E:  03/16/2020     This report was finalized on 3/16/2020 8:01 AM by Dr. Eduardo Russo.                       Results for orders placed in visit on 04/02/19   SCANNED - ECHOCARDIOGRAM       Plan for Follow-up of Pending  Labs/Results:    Order Current Status    Levetiracetam Level (Keppra) In process        Discharge Details        Discharge Medications      Changes to Medications      Instructions Start Date   levETIRAcetam 1000 MG tablet  Commonly known as:  Keppra  What changed:    · medication strength  · how much to take  · when to take this   1,500 mg, Oral, 2 Times Daily         Continue These Medications      Instructions Start Date   albuterol sulfate  (90 Base) MCG/ACT inhaler  Commonly known as:  PROVENTIL HFA;VENTOLIN HFA;PROAIR HFA   2 puffs, Inhalation, Every 4 Hours PRN      furosemide 20 MG tablet  Commonly known as:  LASIX   20 mg, Oral, 2 Times Daily      O2  Commonly known as:  OXYGEN   Inhalation, Nightly      roflumilast 500 MCG tablet tablet  Commonly known as:  DALIRESP   Oral, Daily             Allergies   Allergen Reactions   • Amoxicillin Anaphylaxis   • Other Itching     STEROIDS   • Codeine Hives   • Latex Itching   • Rocephin [Ceftriaxone] Itching         Discharge Disposition:  Home or Self Care    Diet:  Hospital:  Diet Order   Procedures   • Diet Regular; Cardiac       Activity:  Activity Instructions     Activity as Tolerated               CODE STATUS:    Code Status and Medical Interventions:   Ordered at: 03/15/20 1202     Code Status:    CPR     Medical Interventions (Level of Support Prior to Arrest):    Full       Future Appointments   Date Time Provider Department Center   5/1/2020  2:00 PM Halima Obrien PA-C [facility]       Additional Instructions for the Follow-ups that You Need to Schedule     Discharge Follow-up with PCP   As directed       Currently Documented PCP:    Afia Lopez APRN    PCP Phone Number:    [phone]     Follow Up Details:  2 weeks         Discharge Follow-up with Specialty: Neurology; 6 Weeks   As directed      Specialty:  Neurology    Follow Up:  6 Weeks               Time Spent on Discharge:  40 minutes    Electronically signed by Danielle GARRISON  MD Vinh, 03/17/20, 3:52 PM.

## 2020-03-17 NOTE — H&P
[facility] Medicine Services  DISCHARGE SUMMARY    Patient Name: Cristine Berkowitz  : 1965  MRN: [id number]    Date of Admission: 3/15/2020 11:32 AM  Date of Discharge: 3/17/2020  Primary Care Physician: Afia Lopez APRN    Consults     Date and Time Order Name Status Description    3/15/2020 1202 Inpatient Neurology Consult General Completed           Hospital Course     Presenting Problem:   Uncontrolled seizures (CMS/HCC) [R56.9]    Active Hospital Problems    Diagnosis  POA   • **Uncontrolled seizures (CMS/HCC) [R56.9]  Yes     Priority: High   • Seizure disorder (CMS/HCC) [G40.909]  Yes   • Pulmonary hypertension (CMS/HCC) [I27.20]  Yes   • Mild diastolic dysfunction [I51.9]  Yes   • Smoker [F17.200]  Yes   • Moderate obesity [E66.8]  Yes   • COPD (chronic obstructive pulmonary disease) (CMS/HCC) [J44.9]  Yes   • Hypertension [I10]  Yes      Resolved Hospital Problems   No resolved problems to display.          Hospital Course:  Cristine Berkowitz is a 54 y.o. female with PMHX of HTN, diet controlled DM2 (last a1c on chart 2018 6.5%), COPD, diastolic CHF with Pulm HTN based on ECHO from 2018 and takes daily Lasix, and known seizure disorder.    She was seen in [facility] ED with a head CT that was negative.  She had a significant postictal state on presentation.  EEG obtained and is negative for seizure activity.  MRI brain was negative for acute etiology.  Keppra was increased to 1500 mg twice daily.  She had no further seizure activity during her hospitalization.  She was discharged on 50 mg twice daily.  We will also arrange for a 6-week follow-up with neurology on discharge.    Discharge Follow Up Recommendations for outpatient labs/diagnostics:  Referral to neurology in 6 weeks for follow-up.  PCP follow-up in 1 week    Day of Discharge     HPI:   Patient is doing well this morning.  States that her head hurts but is only take any medication for it  ready to go home.  No seizure activity overnight.    Review of Systems  General: denies fevers or chills  CV: denies chest pain  Resp: denies shortness of breath  Abd: denies abd pain, nausea      Vital Signs:   Temp:  [97.5 °F (36.4 °C)-98.7 °F (37.1 °C)] 98.1 °F (36.7 °C)  Heart Rate:  [] 88  Resp:  [18-20] 18  BP: (120-163)/(68-91) 163/91     Physical Exam:  Constitutional: No acute distress, awake, alert  Respiratory: Clear to auscultation bilaterally, respiratory effort normal   Cardiovascular: RRR, no murmurs, rubs, or gallops, palpable pedal pulses bilaterally  Gastrointestinal: Positive bowel sounds, soft, nontender, nondistended  Psychiatric: Labile affect and tearful at times, cooperative  Neurologic: Oriented x 3, strength symmetric in all extremities, Cranial Nerves grossly intact to confrontation, speech clear  Skin: No rashes      Pertinent  and/or Most Recent Results     Results from last 7 days   Lab Units 03/15/20  0407 03/14/20  1353   WBC 10*3/mm3 12.40* 11.59*   HEMOGLOBIN g/dL 13.3 13.9   HEMATOCRIT % 42.3 45.1   PLATELETS 10*3/mm3 175 172   SODIUM mmol/L 140 139   POTASSIUM mmol/L 3.9 3.8   CHLORIDE mmol/L 94* 92*   CO2 mmol/L 35.3* 35.1*   BUN mg/dL 9 8   CREATININE mg/dL 0.54* 0.49*   GLUCOSE mg/dL 119* 150*   CALCIUM mg/dL 10.0 9.8     Results from last 7 days   Lab Units 03/15/20  0407 03/14/20  1353   BILIRUBIN mg/dL 0.5 0.4   ALK PHOS U/L 72 78   ALT (SGPT) U/L 15 18   AST (SGOT) U/L 15 18           Invalid input(s): TG, LDLCALC, LDLREALC  Results from last 7 days   Lab Units 03/15/20  1212 03/15/20  0407 03/14/20  1632 03/14/20  1353   HEMOGLOBIN A1C % 5.60  --   --   --    TROPONIN T ng/mL  --  <0.010 <0.010 <0.010   LACTATE mmol/L  --  1.8  --   --        Brief Urine Lab Results  (Last result in the past 365 days)      Color   Clarity   Blood   Leuk Est   Nitrite   Protein   CREAT   Urine HCG        03/15/20 0415 Yellow Clear Trace Negative Negative Trace                      Microbiology Results Abnormal     None          Imaging Results (All)     Procedure Component Value Units Date/Time    MRI Brain With & Without Contrast [[id number]] Collected:  03/16/20 0737     Updated:  03/16/20 0804    Narrative:       EXAMINATION: MRI BRAIN W WO CONTRAST-     INDICATION: Refractory seizure.      TECHNIQUE: Multiplanar MRI of the brain with and without intravenous  contrast.     COMPARISON: None.     FINDINGS: No restriction on diffusion-weighted sequences. Midline  structures are symmetric without evidence of mass, mass effect or  midline shift. Ventricles and sulci within normal limits. Pituitary and  sella within normal limits. Cervicomedullary junction widely patent.  Globes and orbits retain normal T2 signal characteristics. Visualized  paranasal sinuses and mastoid air cells are grossly clear and well  pneumatized.     Postcontrast sequences performed and mild to moderately motion degraded,  however, no focus of abnormal soft tissue nodular enhancement or mass  occupying focus of abnormal enhancement identified with superior  sagittal sinus patent and the [place] grossly patent and  unremarkable.     Thin section imaging through the mesiotemporal regions without abnormal  sclerosis, asymmetry or atrophy in these regions on thin section coronal  sequencing.       Impression:       Mild to moderate motion degradation, however no acute  infarction or abnormal enhancement. No significant white matter disease  signal aberration within the motion degraded exam. No mesiotemporal  sclerosis, asymmetry or abnormal atrophy of the mesiotemporal regions on  thin section coronal sequencing.      D:  03/16/2020  E:  03/16/2020     This report was finalized on 3/16/2020 8:01 AM by Dr. Eduardo Russo.                       Results for orders placed in visit on 04/02/19   SCANNED - ECHOCARDIOGRAM       Plan for Follow-up of Pending Labs/Results:   [unfilled]  Discharge Details        Discharge  Medications      Changes to Medications      Instructions Start Date   levETIRAcetam 1000 MG tablet  Commonly known as:  Keppra  What changed:    · medication strength  · how much to take  · when to take this   1,500 mg, Oral, 2 Times Daily         Continue These Medications      Instructions Start Date   albuterol sulfate  (90 Base) MCG/ACT inhaler  Commonly known as:  PROVENTIL HFA;VENTOLIN HFA;PROAIR HFA   2 puffs, Inhalation, Every 4 Hours PRN      furosemide 20 MG tablet  Commonly known as:  LASIX   20 mg, Oral, 2 Times Daily      O2  Commonly known as:  OXYGEN   Inhalation, Nightly      roflumilast 500 MCG tablet tablet  Commonly known as:  DALIRESP   Oral, Daily             Allergies   Allergen Reactions   • Amoxicillin Anaphylaxis   • Other Itching     STEROIDS   • Codeine Hives   • Latex Itching   • Rocephin [Ceftriaxone] Itching         Discharge Disposition:  Home or Self Care    Diet:  Hospital:  Diet Order   Procedures   • Diet Regular; Cardiac       Activity:  Activity Instructions     Activity as Tolerated            Restrictions or Other Recommendations:  No driving for at least 3 months until cleared by neurologist       CODE STATUS:    Code Status and Medical Interventions:   Ordered at: 03/15/20 1202     Code Status:    CPR     Medical Interventions (Level of Support Prior to Arrest):    Full       No future appointments.    [unfilled]    Time Spent on Discharge:  40 minutes    Electronically signed by Danielle Justice MD, 03/17/20, 7:42 AM.

## 2020-03-17 NOTE — PROGRESS NOTES
Case Management Discharge Note      Final Note: Spoke with patient at bedside.  Patient discharging home today with family 3/17.  Patient is anxious to get home to 12 year old son.  She states that she does not have a portable oxygen tank to get home, and does not have anyone to bring tank from home.  Patient is current with [employer] in [place].  Called and spoke with Jazzy at [facility] (partner [facility]) at [phone].  She states that they can provide patient with a portable tank for discharge home today, and will deliver it to patient's room prior to discharge.  Patient offered home health services today for discharge.  Patient declines at this time.  Patient states that she does not have clothing or transportation home.  Spoke with Maribell KIM.  She will find clothing for patient in [facility] closet for [place] and has arranged for Medicaid transport to pick patient up at discharge.  Patient does qualify for transport today because she does not have active drivers liscense.  Transport to be called when she is ready to leave hospital.  Patient has been scheduled for all follow up appointments, and they have been added to AVS.      Provided Post Acute Provider List?: Yes  Post Acute Provider List: DME Supplier  Delivered To: Patient  Method of Delivery: In person    Destination      No service has been selected for the patient.      Durable Medical Equipment - Selection Complete      Service Provider Request Status Selected Services Address Phone Number Fax Number    [facility] Selected Durable Medical Equipment [address], [facility] [address] [phone] --    [facility] Selected Durable Medical Equipment [address] [address] [phone]      Dialysis/Infusion      No service has been selected for the patient.      Home Medical Care      No service has been selected for the patient.      Therapy      No service has been  selected for the patient.      Community Resources      No service has been selected for the patient.        Transportation Services  W/C Van: Other(Medicaid van.  Arranged by [facility]    Final Discharge Disposition Code: 01 - home or self-care

## 2020-03-17 NOTE — THERAPY EVALUATION
Acute Care - Occupational Therapy Initial Evaluation  [facility]     Patient Name: Cristine Berkowitz  : 1965  MRN: [id number]  Today's Date: 3/17/2020             Admit Date: 3/15/2020       ICD-10-CM ICD-9-CM   1. Impaired mobility and ADLs Z74.09 799.89     Patient Active Problem List   Diagnosis   • Acute heart failure, previous   • COPD (chronic obstructive pulmonary disease) (CMS/HCC)   • Hypertension   • Moderate obesity   • Seizure disorder (CMS/HCC)   • Pulmonary hypertension (CMS/HCC)   • Mild diastolic dysfunction   • Uncontrolled seizures (CMS/HCC)   • Smoker   • Convulsions (CMS/HCC)     Past Medical History:   Diagnosis Date   • Arthritis    • Asthma    • CHF (congestive heart failure) (CMS/HCC)    • COPD (chronic obstructive pulmonary disease) (CMS/HCC)    • Coronary artery disease    • Diabetes mellitus (CMS/HCC)    • Hypertension    • Seizures (CMS/HCC)      Past Surgical History:   Procedure Laterality Date   • HYSTERECTOMY            OT ASSESSMENT FLOWSHEET (last 12 hours)      Occupational Therapy Evaluation     Row Name 20 0822                   OT Evaluation Time/Intention    Subjective Information  no complaints  -AN        Document Type  evaluation  -AN        Mode of Treatment  occupational therapy  -AN        Patient Effort  good  -AN        Symptoms Noted During/After Treatment  none  -AN           General Information    Patient Profile Reviewed?  yes  -AN        Patient Observations  alert;cooperative;agree to therapy  -AN        Patient/Family Observations  no visitors  -AN        General Observations of Patient  pt walking to bed from bathroom  -AN        Prior Level of Function  independent:;all household mobility;gait;transfer;ADL's;home management  -AN        Equipment Currently Used at Home  oxygen  -AN        Existing Precautions/Restrictions  fall;seizures  -AN        Risks Reviewed  patient:;LOB;dizziness;increased discomfort;change in vital signs  -AN         Benefits Reviewed  patient:;improve function;increase independence;increase strength;increase balance  -AN        Barriers to Rehab  medically complex  -AN           Relationship/Environment    Primary Source of Support/Comfort  child(soraida)  -AN        Lives With  child(soraida), dependent  -AN        Concerns About Impact on Relationships  has 10 yr old at home  -AN           Resource/Environmental Concerns    Current Living Arrangements  home/apartment/condo  -AN           Cognitive Assessment/Interventions    Additional Documentation  Cognitive Assessment/Intervention (Group)  -AN           Cognitive Assessment/Intervention- PT/OT    Affect/Mental Status (Cognitive)  confused  -AN        Orientation Status (Cognition)  oriented to;person;time  -AN        Follows Commands (Cognition)  follows one step commands;over 90% accuracy;repetition of directions required;verbal cues/prompting required  -AN           Bed Mobility Assessment/Treatment    Comment (Bed Mobility)  pt OOB  -AN           Functional Mobility    Functional Mobility- Ind. Level  conditional independence  -AN        Functional Mobility-Distance (Feet)  20  -AN           Transfer Assessment/Treatment    Transfer Assessment/Treatment  sit-stand transfer;stand-sit transfer  -AN           Sit-Stand Transfer    Sit-Stand Gregory (Transfers)  supervision  -AN           Stand-Sit Transfer    Stand-Sit Gregory (Transfers)  set up  -AN           ADL Assessment/Intervention    BADL Assessment/Intervention  lower body dressing;bathing  -AN           Bathing Assessment/Intervention    Comment (Bathing)  provided long sponge to assist with LB bathing and education on use of DME with AE.  -AN           Lower Body Dressing Assessment/Training    Lower Body Dressing Gregory Level  socks;don;doff;minimum assist (75% patient effort)  -AN        Comment (Lower Body Dressing)  provided reacher and sockaid and education to assist with LB dressing  -AN            BADL Safety/Performance    Impairments, BADL Safety/Performance  endurance/activity tolerance;strength  -AN        Skilled BADL Treatment/Intervention  adaptive equipment training  -AN           General ROM    GENERAL ROM COMMENTS  BUE WFL  -AN           MMT (Manual Muscle Testing)    General MMT Comments  BUE WFL  -AN           Motor Assessment/Interventions    Additional Documentation  Balance (Group);Gross Motor Coordination (Group);Therapeutic Exercise (Group)  -AN           Gross Motor Coordination    Gross Motor Skill, Impairments Detail  WFL  -AN           Balance    Balance  dynamic standing balance  -AN           Static Sitting Balance    Level of [place] (Unsupported Sitting, Static Balance)  supervision  -AN           Static Standing Balance    Level of [place] (Supported Standing, Static Balance)  supervision  -AN           Dynamic Standing Balance    Level of [place], Reaches Outside Midline (Standing, Dynamic Balance)  supervision  -AN        Comment, Reaches Outside Midline (Standing, Dynamic Balance)  functional reach  -AN           Sensory Assessment/Intervention    Sensory General Assessment  no sensation deficits identified  -AN        Additional Documentation  Vision Assessment/Intervention (Group)  -AN           Vision Assessment/Intervention    Visual Impairment/Limitations  corrective lenses full time pt does not have glasses at hospital  -AN           Positioning and Restraints    Pre-Treatment Position  in bed  -AN        Post Treatment Position  chair  -AN        In Chair  reclined;call light within reach;encouraged to call for assist;exit alarm on  -AN           Pain Assessment    Additional Documentation  Pain Scale: Numbers Pre/Post-Treatment (Group)  -AN           Pain Scale: Numbers Pre/Post-Treatment    Pain Scale: Numbers, Pretreatment  0/10 - no pain  -AN        Pain Scale: Numbers, Post-Treatment  0/10 - no pain  -AN           Plan of Care Review    Plan of Care  Reviewed With  patient  -AN           Clinical Impression (OT)    OT Diagnosis  impaired ADLs  -AN        Patient/Family Goals Statement (OT Eval)  agreed to OT  -AN        Criteria for Skilled Therapeutic Interventions Met (OT Eval)  yes;treatment indicated  -AN        Rehab Potential (OT Eval)  good, to achieve stated therapy goals  -AN        Therapy Frequency (OT Eval)  daily  -AN        Anticipated Discharge Disposition (OT)  home with assist;home with home health  -AN           Vital Signs    Pre Systolic BP Rehab  163  -AN        Pre Treatment Diastolic BP  91  -AN        Post Systolic BP Rehab  145  -AN        Post Treatment Diastolic BP  73  -AN        Pretreatment Heart Rate (beats/min)  125  -AN        Intratreatment Heart Rate (beats/min)  105  -AN        Posttreatment Heart Rate (beats/min)  101  -AN           OT Goals    Bathing Goal Selection (OT)  bathing, OT goal 1  -AN        Dressing Goal Selection (OT)  dressing, OT goal 1  -AN        Activity Tolerance Goal Selection (OT)  activity tolerance, OT goal 1  -AN        Additional Documentation  Activity Tolerance Goal Selection (OT) (Row)  -AN           Bathing Goal 1 (OT)    Activity/Assistive Device (Bathing Goal 1, OT)  bathing skills, all;long-handled sponge;shower chair  -AN        [place] Level/Cues Needed (Bathing Goal 1, OT)  conditional independence  -AN        Time Frame (Bathing Goal 1, OT)  10 days  -AN        Progress/Outcomes (Bathing Goal 1, OT)  goal ongoing  -AN           Dressing Goal 1 (OT)    Activity/Assistive Device (Dressing Goal 1, OT)  dressing skills, all;reacher;sock-aid  -AN        [place]/Cues Needed (Dressing Goal 1, OT)  supervision required;set-up required  -AN        Time Frame (Dressing Goal 1, OT)  10 days  -AN        Progress/Outcome (Dressing Goal 1, OT)  goal ongoing  -AN            Activity Tolerance Goal 1 (OT)    Activity Tolerance Goal 1 (OT)  Pt will complete 10 minutes therapeutic ex /activities  with 1 rest break to increase endurance for ADLs.   -AN        Time Frame (Activity Tolerance Goal 1, OT)  10 days  -AN        Progress/Outcome (Activity Tolerance Goal 1, OT)  goal ongoing  -AN           Living Environment    Home Accessibility  -- has w/c ramp  -AN          User Key  (r) = Recorded By, (t) = Taken By, (c) = Cosigned By    Initials Name Effective Dates    Regine Mayer OT 06/22/15 -                OT Recommendation and Plan  Outcome Summary/Treatment Plan (OT)  Anticipated Discharge Disposition (OT): home with assist, home with home health  Therapy Frequency (OT Eval): daily  Plan of Care Review  Plan of Care Reviewed With: patient  Plan of Care Reviewed With: patient  Outcome Summary: Pt currently min assist LB dressing, supv mobility. Pt with c/o decreased endurance. Provided AE and education for EC/wS strategies with ADLs.Pt is currently oriented to person and time, confused with place. Would benefit from home with assist and home health.    Outcome Measures     Row Name 03/17/20 0822             How much help from another is currently needed...    Putting on and taking off regular lower body clothing?  3  -AN      Bathing (including washing, rinsing, and drying)  3  -AN      Toileting (which includes using toilet bed pan or urinal)  3  -AN      Putting on and taking off regular upper body clothing  3  -AN      Taking care of personal grooming (such as brushing teeth)  4  -AN      Eating meals  4  -AN      AM-PAC 6 Clicks Score (OT)  20  -AN         Functional Assessment    Outcome Measure Options  AM-PAC 6 Clicks Daily Activity (OT)  -AN        User Key  (r) = Recorded By, (t) = Taken By, (c) = Cosigned By    Initials Name Provider Type    Regine Mayer OT Occupational Therapist          Time Calculation:   Time Calculation- OT     Row Name 03/17/20 0918             Time Calculation- OT    OT Start Time  0822  -AN      Total Timed Code Minutes- OT  0 minute(s)  -AN      OT Received On   03/17/20  -AN      OT Goal Re-Cert Due Date  03/27/20  -AN        User Key  (r) = Recorded By, (t) = Taken By, (c) = Cosigned By    Initials Name Provider Type    Regine Mayer OT Occupational Therapist        Therapy Charges for Today     Code Description Service Date Service Provider Modifiers Qty    [id number] HC OT EVAL LOW COMPLEXITY 4 3/17/2020 Regine Lopes OT GO 1               Regine Lopes OT  3/17/2020

## 2020-03-17 NOTE — THERAPY DISCHARGE NOTE
Patient Name: Cristine Berkowitz  : 1965    MRN: [id number]                              Today's Date: 3/17/2020       Admit Date: 3/15/2020    Visit Dx:     ICD-10-CM ICD-9-CM   1. Impaired mobility and ADLs Z74.09 799.89     Patient Active Problem List   Diagnosis   • Acute heart failure, previous   • COPD (chronic obstructive pulmonary disease) (CMS/HCC)   • Hypertension   • Moderate obesity   • Seizure disorder (CMS/HCC)   • Pulmonary hypertension (CMS/HCC)   • Mild diastolic dysfunction   • Uncontrolled seizures (CMS/HCC)   • Smoker   • Convulsions (CMS/HCC)     Past Medical History:   Diagnosis Date   • Arthritis    • Asthma    • CHF (congestive heart failure) (CMS/HCC)    • COPD (chronic obstructive pulmonary disease) (CMS/HCC)    • Coronary artery disease    • Diabetes mellitus (CMS/HCC)    • Hypertension    • Seizures (CMS/HCC)      Past Surgical History:   Procedure Laterality Date   • HYSTERECTOMY       General Information     Row Name 20 1544          PT Evaluation Time/Intention    Document Type  discharge treatment  -KG     Mode of Treatment  physical therapy  -KG     Row Name 20 1542          General Information    Patient Profile Reviewed?  yes  -KG     Existing Precautions/Restrictions  fall;seizures  -KG     Barriers to Rehab  medically complex  -KG     Row Name 20 1548          Relationship/Environment    Lives With  child(soraida), dependent  -KG     Row Name 20 1549          Cognitive Assessment/Intervention- PT/OT    Orientation Status (Cognition)  oriented to;person;time  -KG     Row Name 20 1547          Safety Issues, Functional Mobility    Impairments Affecting Function (Mobility)  balance;endurance/activity tolerance;shortness of breath;strength  -KG       User Key  (r) = Recorded By, (t) = Taken By, (c) = Cosigned By    Initials Name Provider Type    KG Sherri Lopez Physical Therapist        Mobility     Row Name 20 1546          Sit-Stand  Transfer    Sit-Stand [place] (Transfers)  supervision  -KG     Assistive Device (Sit-Stand Transfers)  other (see comments)  -KG     Row Name 03/17/20 1546          Gait/Stairs Assessment/Training    Gait/Stairs Assessment/Training  gait/ambulation independence  -KG     [place] Level (Gait)  contact guard  -KG     Assistive Device (Gait)  other (see comments)  -KG       User Key  (r) = Recorded By, (t) = Taken By, (c) = Cosigned By    Initials Name Provider Type    Sherri Conway Physical Therapist        Obj/Interventions     Row Name 03/17/20 1547          Therapeutic Exercise    Lower Extremity (Therapeutic Exercise)  quad sets, bilateral STS 2x5, SLS with UE support  -KG     Lower Extremity Range of Motion (Therapeutic Exercise)  ankle dorsiflexion/plantar flexion, bilateral  -KG     Exercise Type (Therapeutic Exercise)  AROM (active range of motion)  -KG     Expected Outcome (Therapeutic Exercise)  facilitate normal movement patterns;improve functional stability  -KG     Row Name 03/17/20 1547          Static Sitting Balance    Level of [place] (Unsupported Sitting, Static Balance)  supervision  -KG     Sitting Position (Unsupported Sitting, Static Balance)  sitting on edge of bed  -KG     Time Able to Maintain Position (Unsupported Sitting, Static Balance)  1 to 2 minutes  -KG     Row Name 03/17/20 1547          Static Standing Balance    Level of [place] (Supported Standing, Static Balance)  supervision  -KG     Time Able to Maintain Position (Supported Standing, Static Balance)  1 to 2 minutes  -KG     Row Name 03/17/20 1547          Dynamic Standing Balance    Level of [place], Reaches Outside Midline (Standing, Dynamic Balance)  supervision  -KG       User Key  (r) = Recorded By, (t) = Taken By, (c) = Cosigned By    Initials Name Provider Type    Sherri Conway Physical Therapist        Goals/Plan     Row Name 03/17/20 1549          Bed Mobility Goal 1 (PT)     Activity/Assistive Device (Bed Mobility Goal 1, PT)  bed mobility activities, all  -KG     [place] Level/Cues Needed (Bed Mobility Goal 1, PT)  independent  -KG     Time Frame (Bed Mobility Goal 1, PT)  2 weeks  -KG     Progress/Outcomes (Bed Mobility Goal 1, PT)  goal met  -KG     Row Name 03/17/20 1549          Transfer Goal 1 (PT)    Activity/Assistive Device (Transfer Goal 1, PT)  sit-to-stand/stand-to-sit;bed-to-chair/chair-to-bed  -KG     [place] Level/Cues Needed (Transfer Goal 1, PT)  supervision required  -KG     Progress/Outcome (Transfer Goal 1, PT)  goal partially met  -KG     Row Name 03/17/20 1549          Gait Training Goal 1 (PT)    Activity/Assistive Device (Gait Training Goal 1, PT)  gait (walking locomotion)  -KG     [place] Level (Gait Training Goal 1, PT)  supervision required  -KG     Progress/Outcome (Gait Training Goal 1, PT)  goal partially met  -KG       User Key  (r) = Recorded By, (t) = Taken By, (c) = Cosigned By    Initials Name Provider Type    Sherri Conway Physical Therapist        Clinical Impression     Row Name 03/17/20 1548          Pain Scale: Numbers Pre/Post-Treatment    Pain Scale: Numbers, Pretreatment  0/10 - no pain  -KG     Pain Scale: Numbers, Post-Treatment  0/10 - no pain  -KG     Row Name 03/17/20 1548          Plan of Care Review    Plan of Care Reviewed With  patient  -KG     Progress  no change  -KG     Outcome Summary  Pt currently safe to return home.  however, still presenting with weakness and balance deficits.  Given HEP to progress at home  -KG     Row Name 03/17/20 1548          Positioning and Restraints    Pre-Treatment Position  sitting in chair/recliner  -KG     Post Treatment Position  chair  -KG     In Chair  notified nsg;call light within reach;encouraged to call for assist;exit alarm on;with other staff  -KG       User Key  (r) = Recorded By, (t) = Taken By, (c) = Cosigned By    Initials Name Provider Type    MARIA D Lopez  Sherri Physical Therapist        Outcome Measures     Row Name 03/17/20 1550          How much help from another person do you currently need...    Turning from your back to your side while in flat bed without using bedrails?  4  -KG     Moving from lying on back to sitting on the side of a flat bed without bedrails?  4  -KG     Moving to and from a bed to a chair (including a wheelchair)?  3  -KG     Standing up from a chair using your arms (e.g., wheelchair, bedside chair)?  3  -KG     Climbing 3-5 steps with a railing?  3  -KG     To walk in hospital room?  3  -KG     AM-PAC 6 Clicks Score (PT)  20  -KG     Row Name 03/17/20 7940          Functional Assessment    Outcome Measure Options  AM-PAC 6 Clicks Basic Mobility (PT)  -KG       User Key  (r) = Recorded By, (t) = Taken By, (c) = Cosigned By    Initials Name Provider Type    Sherri Conway Physical Therapist          PT Recommendation and Plan     Outcome Summary/Treatment Plan (PT)  Anticipated Discharge Disposition (PT): home with assist  Plan of Care Reviewed With: patient  Progress: no change  Outcome Summary: Pt currently safe to return home.  however, still presenting with weakness and balance deficits.  Given HEP to progress at home     Time Calculation:   PT Charges     Row Name 03/17/20 2653             Time Calculation    Start Time  1400  -KG      PT Received On  03/17/20  -KG      PT Goal Re-Cert Due Date  03/26/20  -KG         Time Calculation- PT    Total Timed Code Minutes- PT  18 minute(s)  -KG         Timed Charges    93343 - PT Therapeutic Activity Minutes  18  -KG        User Key  (r) = Recorded By, (t) = Taken By, (c) = Cosigned By    Initials Name Provider Type    Sherri Conway Physical Therapist        Therapy Charges for Today     Code Description Service Date Service Provider Modifiers Qty    6281967  PT THERAPEUTIC ACT EA 15 MIN 3/17/2020 Sherri Lopez GP 1          PT G-Codes  Outcome Measure Options: AM-PAC  6 Clicks Basic Mobility (PT)  AM-PAC 6 Clicks Score (PT): 20  AM-PAC 6 Clicks Score (OT): 20    PT Discharge Summary  Anticipated Discharge Disposition (PT): home with assist  Reason for Discharge: Discharge from facility  Outcomes Achieved: Patient able to partially acheive established goals    Sherri Lopez  3/17/2020

## 2020-03-17 NOTE — PLAN OF CARE
Problem: Patient Care Overview  Goal: Plan of Care Review  3/17/2020 1551 by Sherri Lopez  Flowsheets  Taken 3/17/2020 1551  Progress: no change  Taken 3/17/2020 1548  Plan of Care Reviewed With: patient  Outcome Summary: Pt currently safe to return home.  however, still presenting with weakness and balance deficits.  Given HEP to progress at home

## 2020-03-17 NOTE — PROGRESS NOTES
Continued Stay Note  [facility]     Patient Name: Cristine Berkowtiz  MRN: [id number]  Today's Date: 3/17/2020    Admit Date: 3/15/2020    Discharge Plan     Row Name 03/17/20 1009       Plan    Plan Comments  SW called pt's emergency contact who reports she is unable to provide transportation home for pt due to her van needing some work done and unable to make the trip. SW spoke with pt and pt reports she has used [facility] medicaid transportation in the past. SW called [facility] [phone]. Pt was denied due to a vehicle in her household. [facility] staff, Clif[place], reports pt can call the transportation cabinet at [phone] if doesn't have a vehicle and see if they can get it removed and approved for transportation again. SW explained this to pt and pt requested SW to call the transportation cabinet on their behalf. SW called the transportation cabinet and pt has no removed a vehicle from her name that she has had registered since 2008. However, staff at the cabinet then checked and pt does not have active drivers license so she was able to temporarily approve the medicaid transprotation with that until September. Staff at the cabinet reports that the [facility] are closed at this time but once reopen, pt will need to contact her local county courthouse to get the vehicle unregistered and taken care of longterm. SW will call [facility] to arrange transportation home today when pt ready for discharge.         Discharge Codes    No documentation.       Expected Discharge Date and Time     Expected Discharge Date Expected Discharge Time    Mar 17, 2020             JUDY Winslow

## 2020-03-17 NOTE — PROGRESS NOTES
Continued Stay Note  [facility]     Patient Name: Cristine Berkowitz  MRN: [id number]  Today's Date: 3/17/2020    Admit Date: 3/15/2020    Discharge Plan     Row Name 03/17/20 1242       Plan    Plan Comments  KAUSHAL set up transportation with [facility]. [facility] will be at [facility] around 2:00pm to provide transportation home for pt. KAUSHAL updated pt's RN. [facility]  will call pt's RN when they are at or near [facility].    Row Name 03/17/20 1116       Plan    Plan  Home with familly    Final Discharge Disposition Code  01 - home or self-care    Final Note  Spoke with patient at bedside.  Patient discharging home today with family 3/17.  Patient is anxious to get home to 12 year old son.  She states that she does not have a portable oxygen tank to get home, and does not have anyone to bring tank from home.  Patient is current with [facility] in [place].  Called and spoke with Jazzy at [facility] at [phone].  She states that they can provide patient with a portable tank for discharge home today, and will deliver it to patient's room prior to discharge.  Patient offered home health services today for discharge.  Patient declines at this time.  Patient states that she does not have clothing or transportation home.  Spoke with Maribell KIM.  She will find clothing for patient in [facility] closet for [place] and has arranged for Medicaid transport to pick patient up at discharge.  Patient does qualify for transport today because she does not have active drivers liscense.  Transport to be called when she is ready to leave hospital.  Patient has been scheduled for all follow up appointments, and they have been added to AVS.      Row Name 03/17/20 1009       Plan    Plan Comments  KAUSHAL called pt's emergency contact who reports she is unable to provide transportation home for pt due to her van needing some work done and unable to make the trip. KAUSHAL spoke with pt and pt reports she has used [facility] medicaid transportation in  the past. SW called [facility] [phone]. Pt was denied due to a vehicle in her household. [facility] staff, [place], reports pt can call the transportation cabinet at [phone] if doesn't have a vehicle and see if they can get it removed and approved for transportation again. SW explained this to pt and pt requested SW to call the transportation cabinet on their behalf. SW called the transportation cabinet and pt has no removed a vehicle from her name that she has had registered since 2008. However, staff at the cabinet then checked and pt does not have active drivers license so she was able to temporarily approve the medicaid transprotation with that until September. Staff at the cabinet reports that the [facility] are closed at this time but once reopen, pt will need to contact her local county courthouse to get the vehicle unregistered and taken care of longterm. SW will call [facility] to arrange transportation home today when pt ready for discharge.         Discharge Codes    No documentation.       Expected Discharge Date and Time     Expected Discharge Date Expected Discharge Time    Mar 17, 2020             JUDY Winslow

## 2020-03-17 NOTE — PLAN OF CARE
Problem: Patient Care Overview  Goal: Plan of Care Review  Outcome: Ongoing (interventions implemented as appropriate)  Flowsheets (Taken 3/17/2020 0826)  Outcome Summary: Pt currently min assist LB dressing, supv mobility. Pt with c/o decreased endurance. Provided AE and education for EC/wS strategies with ADLs.Pt is currently oriented to person and time, confused with place. Would benefit from home with assist and home health.

## 2020-03-18 ENCOUNTER — READMISSION MANAGEMENT (OUTPATIENT)
Dept: CALL CENTER | Facility: HOSPITAL | Age: 55
End: 2020-03-18

## 2020-03-18 LAB — LEVETIRACETAM SERPL-MCNC: 23.3 UG/ML (ref 10–40)

## 2020-03-18 NOTE — OUTREACH NOTE
Prep Survey      Responses   [facility] patient discharged from?  [place]   Is LACE score < 7 ?  No   Eligibility  Readm Mgmt   Discharge diagnosis  seizures   Does the patient have one of the following disease processes/diagnoses(primary or secondary)?  Other   Does the patient have Home health ordered?  No   Is there a DME ordered?  Yes   What DME was ordered?  O2   Comments regarding appointments  see AVS   Medication alerts for this patient  keppra   Prep survey completed?  Yes          Kellen Dixon RN

## 2020-03-20 ENCOUNTER — READMISSION MANAGEMENT (OUTPATIENT)
Dept: CALL CENTER | Facility: HOSPITAL | Age: 55
End: 2020-03-20

## 2020-03-20 LAB
BACTERIA SPEC AEROBE CULT: NORMAL
BACTERIA SPEC AEROBE CULT: NORMAL

## 2020-03-20 NOTE — OUTREACH NOTE
Medical Week 1 Survey      Responses   [facility] patient discharged from?  [place]   Does the patient have one of the following disease processes/diagnoses(primary or secondary)?  Other   Is there a successful TCM telephone encounter documented?  No   Week 1 attempt successful?  No   Unsuccessful attempts  Attempt 1          Ihsan Gallegos RN

## 2020-03-22 ENCOUNTER — APPOINTMENT (OUTPATIENT)
Dept: GENERAL RADIOLOGY | Facility: HOSPITAL | Age: 55
End: 2020-03-22

## 2020-03-22 ENCOUNTER — HOSPITAL ENCOUNTER (EMERGENCY)
Facility: HOSPITAL | Age: 55
Discharge: HOME | End: 2020-03-22
Attending: EMERGENCY MEDICINE | Admitting: EMERGENCY MEDICINE

## 2020-03-22 ENCOUNTER — HOSPITAL ENCOUNTER (EMERGENCY)
Facility: HOSPITAL | Age: 55
Discharge: HOME | End: 2020-03-23
Attending: FAMILY MEDICINE | Admitting: FAMILY MEDICINE

## 2020-03-22 VITALS
SYSTOLIC BLOOD PRESSURE: 150 MMHG | TEMPERATURE: 98 F | RESPIRATION RATE: 20 BRPM | HEART RATE: 92 BPM | WEIGHT: 175 LBS | HEIGHT: 60 IN | OXYGEN SATURATION: 90 % | BODY MASS INDEX: 34.36 KG/M2 | DIASTOLIC BLOOD PRESSURE: 79 MMHG

## 2020-03-22 DIAGNOSIS — J20.8 ACUTE BACTERIAL BRONCHITIS: Primary | ICD-10-CM

## 2020-03-22 DIAGNOSIS — S43.402A SPRAIN OF LEFT SHOULDER, UNSPECIFIED SHOULDER SPRAIN TYPE, INITIAL ENCOUNTER: Primary | ICD-10-CM

## 2020-03-22 DIAGNOSIS — B96.89 ACUTE BACTERIAL BRONCHITIS: Primary | ICD-10-CM

## 2020-03-22 LAB
A-A DO2: 44.9 MMHG (ref 0–300)
ALBUMIN SERPL-MCNC: 3.6 G/DL (ref 3.5–5.2)
ALBUMIN/GLOB SERPL: 0.9 G/DL
ALP SERPL-CCNC: 64 U/L (ref 39–117)
ALT SERPL W P-5'-P-CCNC: 25 U/L (ref 1–33)
ANION GAP SERPL CALCULATED.3IONS-SCNC: 13 MMOL/L (ref 5–15)
ARTERIAL PATENCY WRIST A: POSITIVE
AST SERPL-CCNC: 23 U/L (ref 1–32)
ATMOSPHERIC PRESS: 732 MMHG
B PERT DNA SPEC QL NAA+PROBE: NOT DETECTED
BASE EXCESS BLDA CALC-SCNC: 10.3 MMOL/L (ref 0–2)
BASOPHILS # BLD AUTO: 0.04 10*3/MM3 (ref 0–0.2)
BASOPHILS NFR BLD AUTO: 0.4 % (ref 0–1.5)
BDY SITE: ABNORMAL
BILIRUB SERPL-MCNC: 0.2 MG/DL (ref 0.2–1.2)
BODY TEMPERATURE: 0 C
BUN BLD-MCNC: 12 MG/DL (ref 6–20)
BUN/CREAT SERPL: 18.2 (ref 7–25)
C PNEUM DNA NPH QL NAA+NON-PROBE: NOT DETECTED
CALCIUM SPEC-SCNC: 9.6 MG/DL (ref 8.6–10.5)
CHLORIDE SERPL-SCNC: 98 MMOL/L (ref 98–107)
CO2 BLDA-SCNC: 39.1 MMOL/L (ref 22–33)
CO2 SERPL-SCNC: 28 MMOL/L (ref 22–29)
COHGB MFR BLD: 0.5 % (ref 0–5)
CREAT BLD-MCNC: 0.66 MG/DL (ref 0.57–1)
CRP SERPL-MCNC: 0.33 MG/DL (ref 0–0.5)
DEPRECATED RDW RBC AUTO: 45 FL (ref 37–54)
EOSINOPHIL # BLD AUTO: 0.18 10*3/MM3 (ref 0–0.4)
EOSINOPHIL NFR BLD AUTO: 1.9 % (ref 0.3–6.2)
ERYTHROCYTE [DISTWIDTH] IN BLOOD BY AUTOMATED COUNT: 12.2 % (ref 12.3–15.4)
FLUAV AG NPH QL: NEGATIVE
FLUAV H1 2009 PAND RNA NPH QL NAA+PROBE: NOT DETECTED
FLUAV H1 HA GENE NPH QL NAA+PROBE: NOT DETECTED
FLUAV H3 RNA NPH QL NAA+PROBE: NOT DETECTED
FLUAV SUBTYP SPEC NAA+PROBE: NOT DETECTED
FLUBV AG NPH QL IA: NEGATIVE
FLUBV RNA ISLT QL NAA+PROBE: NOT DETECTED
GFR SERPL CREATININE-BSD FRML MDRD: 94 ML/MIN/1.73
GLOBULIN UR ELPH-MCNC: 3.9 GM/DL
GLUCOSE BLD-MCNC: 102 MG/DL (ref 65–99)
HADV DNA SPEC NAA+PROBE: NOT DETECTED
HCO3 BLDA-SCNC: 37.2 MMOL/L (ref 20–26)
HCOV 229E RNA SPEC QL NAA+PROBE: NOT DETECTED
HCOV HKU1 RNA SPEC QL NAA+PROBE: NOT DETECTED
HCOV NL63 RNA SPEC QL NAA+PROBE: NOT DETECTED
HCOV OC43 RNA SPEC QL NAA+PROBE: NOT DETECTED
HCT VFR BLD AUTO: 39.3 % (ref 34–46.6)
HCT VFR BLD CALC: 36.9 % (ref 38–51)
HGB BLD-MCNC: 12.2 G/DL (ref 12–15.9)
HGB BLDA-MCNC: 12 G/DL (ref 13.5–17.5)
HMPV RNA NPH QL NAA+NON-PROBE: NOT DETECTED
HOROWITZ INDEX BLD+IHG-RTO: 28 %
HPIV1 RNA SPEC QL NAA+PROBE: NOT DETECTED
HPIV2 RNA SPEC QL NAA+PROBE: NOT DETECTED
HPIV3 RNA NPH QL NAA+PROBE: NOT DETECTED
HPIV4 P GENE NPH QL NAA+PROBE: NOT DETECTED
IMM GRANULOCYTES # BLD AUTO: 0.02 10*3/MM3 (ref 0–0.05)
IMM GRANULOCYTES NFR BLD AUTO: 0.2 % (ref 0–0.5)
LYMPHOCYTES # BLD AUTO: 2 10*3/MM3 (ref 0.7–3.1)
LYMPHOCYTES NFR BLD AUTO: 21.1 % (ref 19.6–45.3)
Lab: ABNORMAL
M PNEUMO IGG SER IA-ACNC: NOT DETECTED
MCH RBC QN AUTO: 30.9 PG (ref 26.6–33)
MCHC RBC AUTO-ENTMCNC: 31 G/DL (ref 31.5–35.7)
MCV RBC AUTO: 99.5 FL (ref 79–97)
METHGB BLD QL: 0.7 % (ref 0–3)
MODALITY: ABNORMAL
MONOCYTES # BLD AUTO: 0.72 10*3/MM3 (ref 0.1–0.9)
MONOCYTES NFR BLD AUTO: 7.6 % (ref 5–12)
NEUTROPHILS # BLD AUTO: 6.5 10*3/MM3 (ref 1.7–7)
NEUTROPHILS NFR BLD AUTO: 68.8 % (ref 42.7–76)
NOTE: ABNORMAL
NRBC BLD AUTO-RTO: 0 /100 WBC (ref 0–0.2)
OXYHGB MFR BLDV: 94 % (ref 94–99)
PCO2 BLDA: 60.4 MM HG (ref 35–45)
PCO2 TEMP ADJ BLD: ABNORMAL MM[HG]
PH BLDA: 7.4 PH UNITS (ref 7.35–7.45)
PH, TEMP CORRECTED: ABNORMAL
PLATELET # BLD AUTO: 196 10*3/MM3 (ref 140–450)
PMV BLD AUTO: 12.8 FL (ref 6–12)
PO2 BLDA: 79.5 MM HG (ref 83–108)
PO2 TEMP ADJ BLD: ABNORMAL MM[HG]
POTASSIUM BLD-SCNC: 3.9 MMOL/L (ref 3.5–5.2)
PROT SERPL-MCNC: 7.5 G/DL (ref 6–8.5)
RBC # BLD AUTO: 3.95 10*6/MM3 (ref 3.77–5.28)
RHINOVIRUS RNA SPEC NAA+PROBE: NOT DETECTED
RSV RNA NPH QL NAA+NON-PROBE: NOT DETECTED
SAO2 % BLDCOA: 95.1 % (ref 94–99)
SODIUM BLD-SCNC: 139 MMOL/L (ref 136–145)
TROPONIN T SERPL-MCNC: <0.01 NG/ML (ref 0–0.03)
VENTILATOR MODE: ABNORMAL
WBC NRBC COR # BLD: 9.46 10*3/MM3 (ref 3.4–10.8)

## 2020-03-22 PROCEDURE — 87804 INFLUENZA ASSAY W/OPTIC: CPT | Performed by: PHYSICIAN ASSISTANT

## 2020-03-22 PROCEDURE — 82805 BLOOD GASES W/O2 SATURATION: CPT

## 2020-03-22 PROCEDURE — 71046 X-RAY EXAM CHEST 2 VIEWS: CPT

## 2020-03-22 PROCEDURE — 73030 X-RAY EXAM OF SHOULDER: CPT

## 2020-03-22 PROCEDURE — 82375 ASSAY CARBOXYHB QUANT: CPT

## 2020-03-22 PROCEDURE — 36600 WITHDRAWAL OF ARTERIAL BLOOD: CPT

## 2020-03-22 PROCEDURE — 93010 ELECTROCARDIOGRAM REPORT: CPT | Performed by: INTERNAL MEDICINE

## 2020-03-22 PROCEDURE — 84484 ASSAY OF TROPONIN QUANT: CPT | Performed by: PHYSICIAN ASSISTANT

## 2020-03-22 PROCEDURE — 86140 C-REACTIVE PROTEIN: CPT | Performed by: PHYSICIAN ASSISTANT

## 2020-03-22 PROCEDURE — 80053 COMPREHEN METABOLIC PANEL: CPT | Performed by: PHYSICIAN ASSISTANT

## 2020-03-22 PROCEDURE — 0099U HC BIOFIRE FILMARRAY RESP PANEL 1: CPT | Performed by: PHYSICIAN ASSISTANT

## 2020-03-22 PROCEDURE — 99284 EMERGENCY DEPT VISIT MOD MDM: CPT

## 2020-03-22 PROCEDURE — 83050 HGB METHEMOGLOBIN QUAN: CPT

## 2020-03-22 PROCEDURE — 93005 ELECTROCARDIOGRAM TRACING: CPT | Performed by: PHYSICIAN ASSISTANT

## 2020-03-22 PROCEDURE — 85025 COMPLETE CBC W/AUTO DIFF WBC: CPT | Performed by: PHYSICIAN ASSISTANT

## 2020-03-22 RX ORDER — SODIUM CHLORIDE 0.9 % (FLUSH) 0.9 %
10 SYRINGE (ML) INJECTION AS NEEDED
Status: DISCONTINUED | OUTPATIENT
Start: 2020-03-22 | End: 2020-03-23 | Stop reason: HOSPADM

## 2020-03-23 ENCOUNTER — APPOINTMENT (OUTPATIENT)
Dept: CT IMAGING | Facility: HOSPITAL | Age: 55
End: 2020-03-23

## 2020-03-23 VITALS
SYSTOLIC BLOOD PRESSURE: 143 MMHG | RESPIRATION RATE: 20 BRPM | TEMPERATURE: 98.7 F | WEIGHT: 175 LBS | DIASTOLIC BLOOD PRESSURE: 77 MMHG | BODY MASS INDEX: 34.36 KG/M2 | HEIGHT: 60 IN | OXYGEN SATURATION: 100 % | HEART RATE: 76 BPM

## 2020-03-23 PROCEDURE — 71250 CT THORAX DX C-: CPT

## 2020-03-23 RX ORDER — METHYLPREDNISOLONE 4 MG/1
TABLET ORAL
Qty: 1 EACH | Refills: 0 | Status: SHIPPED | OUTPATIENT
Start: 2020-03-23

## 2020-03-23 RX ORDER — DOXYCYCLINE 100 MG/1
100 CAPSULE ORAL 2 TIMES DAILY
Qty: 14 CAPSULE | Refills: 0 | Status: SHIPPED | OUTPATIENT
Start: 2020-03-23 | End: 2020-03-30

## 2020-03-26 ENCOUNTER — READMISSION MANAGEMENT (OUTPATIENT)
Dept: CALL CENTER | Facility: HOSPITAL | Age: 55
End: 2020-03-26

## 2020-03-26 NOTE — OUTREACH NOTE
Medical Week 2 Survey      Responses   [facility] patient discharged from?  [place]   Does the patient have one of the following disease processes/diagnoses(primary or secondary)?  Other          Rosita Cain LPN

## 2020-03-31 ENCOUNTER — READMISSION MANAGEMENT (OUTPATIENT)
Dept: CALL CENTER | Facility: HOSPITAL | Age: 55
End: 2020-03-31

## 2020-03-31 NOTE — OUTREACH NOTE
Medical Week 1 Survey      Responses   [facility] patient discharged from?  [place]   Does the patient have one of the following disease processes/diagnoses(primary or secondary)?  Other   Is there a successful TCM telephone encounter documented?  No   Week 1 attempt successful?  Yes   Call start time  1903   Call end time  1913   Discharge diagnosis  seizures   Medication alerts for this patient  keppra   Meds reviewed with patient/caregiver?  Yes   Is the patient having any side effects they believe may be caused by any medication additions or changes?  No   Does the patient have all medications ordered at discharge?  Yes   Is the patient taking all medications as directed (includes completed medication regime)?  Yes   Does the patient have a primary care provider?   Yes   Does the patient have an appointment with their PCP within 7 days of discharge?  N/A   Comments regarding PCP  Denise Ascencio   Has the patient kept scheduled appointments due by today?  N/A   Comments  Encouraged to call and discuss visit possibilities with PCP. Says she just got her medication today.   Has home health visited the patient within 72 hours of discharge?  N/A   Has all DME been delivered?  Yes   Psychosocial issues?  No   Did the patient receive a copy of their discharge instructions?  Yes   Nursing interventions  Reviewed instructions with patient   What is the patient's perception of their health status since discharge?  Improving   Is the patient/caregiver able to teach back signs and symptoms related to disease process for when to call PCP?  Yes   Is the patient/caregiver able to teach back signs and symptoms related to disease process for when to call 911?  Yes   Is the patient/caregiver able to teach back the hierarchy of who to call/visit for symptoms/problems? PCP, Specialist, Home health nurse, Urgent Care, ED, 911  Yes   Additional teach back comments  She says she is staying in and is getting by as best she can.    Week 1 call completed?  Yes          Leanne Rose RN

## 2020-04-06 ENCOUNTER — READMISSION MANAGEMENT (OUTPATIENT)
Dept: CALL CENTER | Facility: HOSPITAL | Age: 55
End: 2020-04-06

## 2020-04-06 NOTE — OUTREACH NOTE
"Medical Week 2 Survey      Responses   [facility] patient discharged from?  [place]   Does the patient have one of the following disease processes/diagnoses(primary or secondary)?  Other   Week 2 attempt successful?  Yes   Call start time  1253   Discharge diagnosis  seizures   Call end time  1300   Meds reviewed with patient/caregiver?  Yes   Is the patient taking all medications as directed (includes completed medication regime)?  No   What is preventing the patient from taking all medications as directed?  Other   Nursing Interventions  Advised patient to call provider   Medication comments  Pt reports that she continues to take 750mg BID because she does not want to take the 1500mg BID as prescribed. She thinks the 1500mg is too much and did not like how it made her feel \"spaced out\".    Does the patient have an appointment with their PCP within 7 days of discharge?  Yes   Comments regarding PCP  Denise Ascencio   Has the patient kept scheduled appointments due by today?  No   Nursing Interventions  Advised to reschedule appointment   What DME was ordered?  O2   Has all DME been delivered?  Yes   DME comments  Pt continues to use 2L O2 continuously.    Psychosocial issues?  No   Comments  Pt reports having \"light seizure\" her hands started shaking, got dizzy then she \"started to come out of it when I got my mind off of it\".    What is the patient's perception of their health status since discharge?  Improving   Is the patient/caregiver able to teach back signs and symptoms related to disease process for when to call PCP?  Yes   Is the patient/caregiver able to teach back signs and symptoms related to disease process for when to call 911?  Yes   Week 2 Call Completed?  Yes          Marianne Peacock RN  "

## 2020-04-13 ENCOUNTER — READMISSION MANAGEMENT (OUTPATIENT)
Dept: CALL CENTER | Facility: HOSPITAL | Age: 55
End: 2020-04-13

## 2020-04-13 NOTE — OUTREACH NOTE
Medical Week 3 Survey      Responses   [facility] patient discharged from?  [place]   COVID-19 Test Status  Not tested   Does the patient have one of the following disease processes/diagnoses(primary or secondary)?  Other   Week 3 attempt successful?  No   Unsuccessful attempts  Attempt 1          Jazzy Thompson RN

## 2020-04-14 ENCOUNTER — READMISSION MANAGEMENT (OUTPATIENT)
Dept: CALL CENTER | Facility: HOSPITAL | Age: 55
End: 2020-04-14

## 2020-04-14 NOTE — OUTREACH NOTE
Medical Week 3 Survey      Responses   [facility] patient discharged from?  [place]   COVID-19 Test Status  Not tested   Does the patient have one of the following disease processes/diagnoses(primary or secondary)?  Other   Week 3 attempt successful?  Yes   Call start time  1544   Call end time  1554   Discharge diagnosis  seizures   Meds reviewed with patient/caregiver?  Yes   Is the patient having any side effects they believe may be caused by any medication additions or changes?  No   Does the patient have all medications ordered at discharge?  Yes   Prescription comments  pt states is still taking 1/2 dosage of Keppra,  advised pt to notify MD of dose change and discuss alternatives, pt agreed with plan   Is the patient taking all medications as directed (includes completed medication regime)?  No   What is preventing the patient from taking all medications as directed?  Other   Nursing Interventions  Nurse provided patient education, Advised patient to call provider   Does the patient have a primary care provider?   Yes   Does the patient have an appointment with their PCP within 7 days of discharge?  Yes   Has the patient kept scheduled appointments due by today?  No   What is preventing the patient from keeping their appointments?  Doesn't understand importance   Nursing Interventions  Advised to reschedule appointment, Educated on importance of keeping appointment, Advised patient to keep appointment   Has home health visited the patient within 72 hours of discharge?  N/A   Psychosocial issues?  No   Comments  pt denies seizure activity   Did the patient receive a copy of their discharge instructions?  Yes   Nursing interventions  Reviewed instructions with patient   What is the patient's perception of their health status since discharge?  Improving   Is the patient/caregiver able to teach back signs and symptoms related to disease process for when to call PCP?  Yes   Is the patient/caregiver able to  teach back signs and symptoms related to disease process for when to call 911?  Yes   Is the patient/caregiver able to teach back the hierarchy of who to call/visit for symptoms/problems? PCP, Specialist, Home health nurse, Urgent Care, ED, 911  Yes   Additional teach back comments  pt states understanding of recognizing seizure activity, but pt states is continuing with 1/2 prescribed dose of Keppra, pt states understanding that issue must be discussed with MD, states will contact MD, but pt does not give impression that she will be compliant   Week 3 Call Completed?  Yes          Mary Lara RN

## 2020-04-23 ENCOUNTER — READMISSION MANAGEMENT (OUTPATIENT)
Dept: CALL CENTER | Facility: HOSPITAL | Age: 55
End: 2020-04-23

## 2020-04-23 NOTE — OUTREACH NOTE
Medical Week 4 Survey      Responses   [facility] patient discharged from?  [place]   COVID-19 Test Status  Not tested   Does the patient have one of the following disease processes/diagnoses(primary or secondary)?  Other   Week 4 attempt successful?  Yes   Call start time  1444   Call end time  1446   Discharge diagnosis  seizures   Medication alerts for this patient  keppra   Meds reviewed with patient/caregiver?  Yes   Is the patient having any side effects they believe may be caused by any medication additions or changes?  No   Is the patient taking all medications as directed (includes completed medication regime)?  No   Nursing Interventions  Nurse provided patient education   Medication comments  Still on 750 mg of Keppra   Has the patient kept scheduled appointments due by today?  No   Is the patient still receiving Home Health Services?  N/A   Comments  pt hard to hear and understand.   Week 4 Call Completed?  Yes   Would the patient like one additional call?  No   Graduated  Yes   Did the patient feel the follow up calls were helpful during their recovery period?  Yes   Was the number of calls appropriate?  Yes          Leanne Rose RN

## 2020-06-10 ENCOUNTER — APPOINTMENT (OUTPATIENT)
Dept: GENERAL RADIOLOGY | Facility: HOSPITAL | Age: 55
End: 2020-06-10

## 2020-06-10 ENCOUNTER — HOSPITAL ENCOUNTER (EMERGENCY)
Facility: HOSPITAL | Age: 55
Discharge: HOME | End: 2020-06-10
Attending: EMERGENCY MEDICINE | Admitting: EMERGENCY MEDICINE

## 2020-06-10 VITALS
OXYGEN SATURATION: 90 % | SYSTOLIC BLOOD PRESSURE: 131 MMHG | BODY MASS INDEX: 33.38 KG/M2 | TEMPERATURE: 98.7 F | RESPIRATION RATE: 24 BRPM | WEIGHT: 170 LBS | HEIGHT: 60 IN | DIASTOLIC BLOOD PRESSURE: 72 MMHG | HEART RATE: 101 BPM

## 2020-06-10 DIAGNOSIS — J44.1 COPD WITH ACUTE EXACERBATION (HCC): Primary | ICD-10-CM

## 2020-06-10 LAB
A-A DO2: 244.2 MMHG (ref 0–300)
A-A DO2: 56.6 MMHG (ref 0–300)
ALBUMIN SERPL-MCNC: 2.6 G/DL (ref 3.5–5.2)
ALBUMIN/GLOB SERPL: 0.5 G/DL
ALP SERPL-CCNC: 76 U/L (ref 39–117)
ALT SERPL W P-5'-P-CCNC: 15 U/L (ref 1–33)
ANION GAP SERPL CALCULATED.3IONS-SCNC: 6.4 MMOL/L (ref 5–15)
ARTERIAL PATENCY WRIST A: ABNORMAL
ARTERIAL PATENCY WRIST A: POSITIVE
AST SERPL-CCNC: 12 U/L (ref 1–32)
ATMOSPHERIC PRESS: 721 MMHG
ATMOSPHERIC PRESS: 722 MMHG
BASE EXCESS BLDA CALC-SCNC: 16.4 MMOL/L (ref 0–2)
BASE EXCESS BLDA CALC-SCNC: 17.1 MMOL/L (ref 0–2)
BASOPHILS # BLD AUTO: 0.07 10*3/MM3 (ref 0–0.2)
BASOPHILS NFR BLD AUTO: 0.6 % (ref 0–1.5)
BDY SITE: ABNORMAL
BDY SITE: ABNORMAL
BILIRUB SERPL-MCNC: 0.3 MG/DL (ref 0.2–1.2)
BODY TEMPERATURE: 0 C
BODY TEMPERATURE: 0 C
BUN BLD-MCNC: 10 MG/DL (ref 6–20)
BUN/CREAT SERPL: 18.5 (ref 7–25)
CALCIUM SPEC-SCNC: 8.9 MG/DL (ref 8.6–10.5)
CHLORIDE SERPL-SCNC: 96 MMOL/L (ref 98–107)
CO2 BLDA-SCNC: 45.8 MMOL/L (ref 22–33)
CO2 BLDA-SCNC: 47.3 MMOL/L (ref 22–33)
CO2 SERPL-SCNC: 37.6 MMOL/L (ref 22–29)
COHGB MFR BLD: 0.6 % (ref 0–5)
COHGB MFR BLD: 1.3 % (ref 0–5)
CREAT BLD-MCNC: 0.54 MG/DL (ref 0.57–1)
D-LACTATE SERPL-SCNC: 0.8 MMOL/L (ref 0.5–2)
DEPRECATED RDW RBC AUTO: 53.8 FL (ref 37–54)
EOSINOPHIL # BLD AUTO: 0.13 10*3/MM3 (ref 0–0.4)
EOSINOPHIL NFR BLD AUTO: 1.2 % (ref 0.3–6.2)
ERYTHROCYTE [DISTWIDTH] IN BLOOD BY AUTOMATED COUNT: 14 % (ref 12.3–15.4)
FLUAV AG NPH QL: NEGATIVE
FLUBV AG NPH QL IA: NEGATIVE
GAS FLOW AIRWAY: 2.5 LPM
GAS FLOW AIRWAY: 30 LPM
GFR SERPL CREATININE-BSD FRML MDRD: 119 ML/MIN/1.73
GLOBULIN UR ELPH-MCNC: 4.9 GM/DL
GLUCOSE BLD-MCNC: 117 MG/DL (ref 65–99)
HCO3 BLDA-SCNC: 43.9 MMOL/L (ref 20–26)
HCO3 BLDA-SCNC: 45 MMOL/L (ref 20–26)
HCT VFR BLD AUTO: 39.7 % (ref 34–46.6)
HCT VFR BLD CALC: 34.5 % (ref 38–51)
HCT VFR BLD CALC: 36.1 % (ref 38–51)
HGB BLD-MCNC: 10.9 G/DL (ref 12–15.9)
HGB BLDA-MCNC: 11.3 G/DL (ref 13.5–17.5)
HGB BLDA-MCNC: 11.8 G/DL (ref 13.5–17.5)
HOROWITZ INDEX BLD+IHG-RTO: 30 %
HOROWITZ INDEX BLD+IHG-RTO: 55 %
HYPOCHROMIA BLD QL: NORMAL
IMM GRANULOCYTES # BLD AUTO: 0.4 10*3/MM3 (ref 0–0.05)
IMM GRANULOCYTES NFR BLD AUTO: 3.7 % (ref 0–0.5)
LARGE PLATELETS: NORMAL
LYMPHOCYTES # BLD AUTO: 0.98 10*3/MM3 (ref 0.7–3.1)
LYMPHOCYTES NFR BLD AUTO: 9 % (ref 19.6–45.3)
Lab: ABNORMAL
MACROCYTES BLD QL SMEAR: NORMAL
MCH RBC QN AUTO: 29.3 PG (ref 26.6–33)
MCHC RBC AUTO-ENTMCNC: 27.5 G/DL (ref 31.5–35.7)
MCV RBC AUTO: 106.7 FL (ref 79–97)
METHGB BLD QL: 0.4 % (ref 0–3)
METHGB BLD QL: 0.4 % (ref 0–3)
MODALITY: ABNORMAL
MODALITY: ABNORMAL
MONOCYTES # BLD AUTO: 0.39 10*3/MM3 (ref 0.1–0.9)
MONOCYTES NFR BLD AUTO: 3.6 % (ref 5–12)
NEUTROPHILS # BLD AUTO: 8.92 10*3/MM3 (ref 1.7–7)
NEUTROPHILS NFR BLD AUTO: 81.9 % (ref 42.7–76)
NOTE: ABNORMAL
NOTE: ABNORMAL
NOTIFIED BY: ABNORMAL
NOTIFIED WHO: ABNORMAL
NRBC BLD AUTO-RTO: 1.3 /100 WBC (ref 0–0.2)
NT-PROBNP SERPL-MCNC: 951.9 PG/ML (ref 5–900)
OXYHGB MFR BLDV: 87.5 % (ref 94–99)
OXYHGB MFR BLDV: 89.5 % (ref 94–99)
PCO2 BLDA: 62.4 MM HG (ref 35–45)
PCO2 BLDA: 77.9 MM HG (ref 35–45)
PCO2 TEMP ADJ BLD: ABNORMAL MM[HG]
PCO2 TEMP ADJ BLD: ABNORMAL MM[HG]
PH BLDA: 7.37 PH UNITS (ref 7.35–7.45)
PH BLDA: 7.46 PH UNITS (ref 7.35–7.45)
PH, TEMP CORRECTED: ABNORMAL
PH, TEMP CORRECTED: ABNORMAL
PLATELET # BLD AUTO: 253 10*3/MM3 (ref 140–450)
PMV BLD AUTO: 11.5 FL (ref 6–12)
PO2 BLDA: 59.2 MM HG (ref 83–108)
PO2 BLDA: 59.5 MM HG (ref 83–108)
PO2 TEMP ADJ BLD: ABNORMAL MM[HG]
PO2 TEMP ADJ BLD: ABNORMAL MM[HG]
POTASSIUM BLD-SCNC: 4.5 MMOL/L (ref 3.5–5.2)
PROT SERPL-MCNC: 7.5 G/DL (ref 6–8.5)
RBC # BLD AUTO: 3.72 10*6/MM3 (ref 3.77–5.28)
SAO2 % BLDCOA: 89 % (ref 94–99)
SAO2 % BLDCOA: 90.4 % (ref 94–99)
SARS-COV-2 RNA PNL SPEC NAA+PROBE: NOT DETECTED
SODIUM BLD-SCNC: 140 MMOL/L (ref 136–145)
TROPONIN T SERPL-MCNC: <0.01 NG/ML (ref 0–0.03)
VENTILATOR MODE: ABNORMAL
VENTILATOR MODE: ABNORMAL
WBC NRBC COR # BLD: 10.89 10*3/MM3 (ref 3.4–10.8)

## 2020-06-10 PROCEDURE — 87635 SARS-COV-2 COVID-19 AMP PRB: CPT | Performed by: HOSPITALIST

## 2020-06-10 PROCEDURE — 94799 UNLISTED PULMONARY SVC/PX: CPT

## 2020-06-10 PROCEDURE — 83050 HGB METHEMOGLOBIN QUAN: CPT

## 2020-06-10 PROCEDURE — 83605 ASSAY OF LACTIC ACID: CPT | Performed by: EMERGENCY MEDICINE

## 2020-06-10 PROCEDURE — 82375 ASSAY CARBOXYHB QUANT: CPT

## 2020-06-10 PROCEDURE — 83880 ASSAY OF NATRIURETIC PEPTIDE: CPT | Performed by: EMERGENCY MEDICINE

## 2020-06-10 PROCEDURE — 99284 EMERGENCY DEPT VISIT MOD MDM: CPT

## 2020-06-10 PROCEDURE — 82805 BLOOD GASES W/O2 SATURATION: CPT

## 2020-06-10 PROCEDURE — 85007 BL SMEAR W/DIFF WBC COUNT: CPT | Performed by: EMERGENCY MEDICINE

## 2020-06-10 PROCEDURE — 71045 X-RAY EXAM CHEST 1 VIEW: CPT | Performed by: RADIOLOGY

## 2020-06-10 PROCEDURE — 84484 ASSAY OF TROPONIN QUANT: CPT | Performed by: EMERGENCY MEDICINE

## 2020-06-10 PROCEDURE — 36600 WITHDRAWAL OF ARTERIAL BLOOD: CPT

## 2020-06-10 PROCEDURE — 71045 X-RAY EXAM CHEST 1 VIEW: CPT

## 2020-06-10 PROCEDURE — 87804 INFLUENZA ASSAY W/OPTIC: CPT | Performed by: EMERGENCY MEDICINE

## 2020-06-10 PROCEDURE — 87040 BLOOD CULTURE FOR BACTERIA: CPT | Performed by: EMERGENCY MEDICINE

## 2020-06-10 PROCEDURE — 94660 CPAP INITIATION&MGMT: CPT

## 2020-06-10 PROCEDURE — 80053 COMPREHEN METABOLIC PANEL: CPT | Performed by: EMERGENCY MEDICINE

## 2020-06-10 PROCEDURE — 94640 AIRWAY INHALATION TREATMENT: CPT

## 2020-06-10 PROCEDURE — 85025 COMPLETE CBC W/AUTO DIFF WBC: CPT | Performed by: EMERGENCY MEDICINE

## 2020-06-10 RX ORDER — DOXYCYCLINE 100 MG/1
100 CAPSULE ORAL 2 TIMES DAILY
Qty: 14 CAPSULE | Refills: 0 | OUTPATIENT
Start: 2020-06-10 | End: 2022-11-06

## 2020-06-10 RX ORDER — IPRATROPIUM BROMIDE AND ALBUTEROL SULFATE 2.5; .5 MG/3ML; MG/3ML
SOLUTION RESPIRATORY (INHALATION)
Status: COMPLETED
Start: 2020-06-10 | End: 2020-06-10

## 2020-06-10 RX ORDER — IPRATROPIUM BROMIDE AND ALBUTEROL SULFATE 2.5; .5 MG/3ML; MG/3ML
3 SOLUTION RESPIRATORY (INHALATION) ONCE
Status: COMPLETED | OUTPATIENT
Start: 2020-06-10 | End: 2020-06-10

## 2020-06-10 RX ADMIN — IPRATROPIUM BROMIDE AND ALBUTEROL SULFATE 3 ML: 2.5; .5 SOLUTION RESPIRATORY (INHALATION) at 15:40

## 2020-06-10 RX ADMIN — IPRATROPIUM BROMIDE AND ALBUTEROL SULFATE 3 ML: .5; 3 SOLUTION RESPIRATORY (INHALATION) at 15:40

## 2020-06-10 NOTE — DISCHARGE INSTRUCTIONS
Call one of the offices below to establish a primary care provider.  If you are unable to get an appointment and feel it is an emergency and need to be seen immediately please return to the Emergency Department.    Call one of the office below to set up a primary care provider.    Dr. Riky Regalado                                                                                                       [address] [address]  [phone]    Dr. Santos, Dr. GAGE Wright, Dr. PATT Wright ([facility])  [address] [facility] [facility] [address]  [phone]    Dr. Iglesias, Dr. Marie, Dr. Demarco ([facility])  [address] [facility] [address]  [phone]    Dr. Madrid  [address] [address]  [phone]    Dr. Duong, Dr. Farrar, Dr. Sanders, Dr. Munoz ([facility])  [address] [place] [address] [address]  [facility] [address]  [phone]    Dr. Malgorzata Rose  [address] [facility] [place] [address]  [phone]    Dr. Rima Segovia  [address]   [address]  [facility] [address]  [phone]    Dr. Regalado  [address] [address]  [phone]    Dr. Butler, Dr. Root  [address] [place] [address]  [phone]    Dr. Overton  [address] [address]                                                              [address]  [facility] [address]  [phone]    Dr. Argueta  [address] [facility] [address]  [phone]    Dr. Karolyn Winston  [address]  [address]  [place] [address]  [phone]    Dr. Null and [facility]   [address] [facility]  [address]  [place] [address]  [phone]

## 2020-06-11 ENCOUNTER — EPISODE CHANGES (OUTPATIENT)
Dept: CASE MANAGEMENT | Facility: OTHER | Age: 55
End: 2020-06-11

## 2020-06-11 NOTE — ED NOTES
pts ride has arrived at this time, pt finishing her lunch box then will go home.  Lead made aware.     Tania Molina RN  06/10/20 2045

## 2020-06-11 NOTE — ED NOTES
Pt removed from hi-flow O2 at this time and placed on her home dose of 5L via NC, pt tolerating well.     Tania Molina RN  06/10/20 2045

## 2020-06-15 ENCOUNTER — PATIENT OUTREACH (OUTPATIENT)
Dept: CASE MANAGEMENT | Facility: OTHER | Age: 55
End: 2020-06-15

## 2020-06-15 LAB
BACTERIA SPEC AEROBE CULT: NORMAL
BACTERIA SPEC AEROBE CULT: NORMAL

## 2020-06-15 NOTE — OUTREACH NOTE
Patient Outreach Note    Unsuccessful outreach attempts x 4.  Patient had Ed visit at [facility].  Patient presented with c/o shortness of breath.  Negative COVID19 testing.  Patient noted as not having a PCP. HRCM outreach discontinued as UTR.    Latonya Robles RN  Ambulatory     6/15/2020, 14:27

## 2020-07-20 ENCOUNTER — APPOINTMENT (OUTPATIENT)
Dept: GENERAL RADIOLOGY | Facility: HOSPITAL | Age: 55
End: 2020-07-20

## 2020-07-20 ENCOUNTER — APPOINTMENT (OUTPATIENT)
Dept: CT IMAGING | Facility: HOSPITAL | Age: 55
End: 2020-07-20

## 2020-07-20 ENCOUNTER — HOSPITAL ENCOUNTER (EMERGENCY)
Facility: HOSPITAL | Age: 55
Discharge: ANOTHER HOSPITAL | End: 2020-07-20
Attending: FAMILY MEDICINE | Admitting: FAMILY MEDICINE

## 2020-07-20 VITALS
RESPIRATION RATE: 18 BRPM | HEART RATE: 86 BPM | HEIGHT: 60 IN | BODY MASS INDEX: 35.34 KG/M2 | SYSTOLIC BLOOD PRESSURE: 115 MMHG | DIASTOLIC BLOOD PRESSURE: 99 MMHG | OXYGEN SATURATION: 100 % | WEIGHT: 180 LBS

## 2020-07-20 DIAGNOSIS — J96.02 ACUTE RESPIRATORY FAILURE WITH HYPERCAPNIA (HCC): Primary | ICD-10-CM

## 2020-07-20 LAB
A-A DO2: 155.9 MMHG (ref 0–300)
A-A DO2: 171.1 MMHG (ref 0–300)
A-A DO2: 325.3 MMHG (ref 0–300)
A-A DO2: 6.2 MMHG (ref 0–300)
ALBUMIN SERPL-MCNC: 3.79 G/DL (ref 3.5–5.2)
ALBUMIN/GLOB SERPL: 1 G/DL
ALP SERPL-CCNC: 75 U/L (ref 39–117)
ALT SERPL W P-5'-P-CCNC: 22 U/L (ref 1–33)
ANION GAP SERPL CALCULATED.3IONS-SCNC: 6.4 MMOL/L (ref 5–15)
ANISOCYTOSIS BLD QL: NORMAL
ARTERIAL PATENCY WRIST A: POSITIVE
AST SERPL-CCNC: 20 U/L (ref 1–32)
ATMOSPHERIC PRESS: 728 MMHG
ATMOSPHERIC PRESS: 729 MMHG
BASE EXCESS BLDA CALC-SCNC: 14.6 MMOL/L (ref 0–2)
BASE EXCESS BLDA CALC-SCNC: 15.4 MMOL/L (ref 0–2)
BASE EXCESS BLDA CALC-SCNC: 15.4 MMOL/L (ref 0–2)
BASE EXCESS BLDA CALC-SCNC: 16.1 MMOL/L (ref 0–2)
BASOPHILS # BLD AUTO: 0.03 10*3/MM3 (ref 0–0.2)
BASOPHILS NFR BLD AUTO: 0.4 % (ref 0–1.5)
BDY SITE: ABNORMAL
BILIRUB SERPL-MCNC: 0.5 MG/DL (ref 0–1.2)
BODY TEMPERATURE: 0 C
BUN SERPL-MCNC: 9 MG/DL (ref 6–20)
BUN/CREAT SERPL: 12.3 (ref 7–25)
CALCIUM SPEC-SCNC: 9.4 MG/DL (ref 8.6–10.5)
CHLORIDE SERPL-SCNC: 93 MMOL/L (ref 98–107)
CO2 BLDA-SCNC: 47.5 MMOL/L (ref 22–33)
CO2 BLDA-SCNC: 50.1 MMOL/L (ref 22–33)
CO2 BLDA-SCNC: 50.3 MMOL/L (ref 22–33)
CO2 BLDA-SCNC: 50.5 MMOL/L (ref 22–33)
CO2 SERPL-SCNC: 42.6 MMOL/L (ref 22–29)
COHGB MFR BLD: 2 % (ref 0–5)
COHGB MFR BLD: 2.1 % (ref 0–5)
COHGB MFR BLD: 2.3 % (ref 0–5)
COHGB MFR BLD: 2.3 % (ref 0–5)
CREAT SERPL-MCNC: 0.73 MG/DL (ref 0.57–1)
CRP SERPL-MCNC: 0.32 MG/DL (ref 0–0.5)
D-LACTATE SERPL-SCNC: 1.2 MMOL/L (ref 0.5–2)
DEPRECATED RDW RBC AUTO: 67.6 FL (ref 37–54)
EOSINOPHIL # BLD AUTO: 0.16 10*3/MM3 (ref 0–0.4)
EOSINOPHIL NFR BLD AUTO: 2.1 % (ref 0.3–6.2)
EPAP: 5
EPAP: 6
ERYTHROCYTE [DISTWIDTH] IN BLOOD BY AUTOMATED COUNT: 17 % (ref 12.3–15.4)
GFR SERPL CREATININE-BSD FRML MDRD: 84 ML/MIN/1.73
GLOBULIN UR ELPH-MCNC: 3.9 GM/DL
GLUCOSE SERPL-MCNC: 147 MG/DL (ref 65–99)
HCO3 BLDA-SCNC: 45 MMOL/L (ref 20–26)
HCO3 BLDA-SCNC: 47.1 MMOL/L (ref 20–26)
HCO3 BLDA-SCNC: 47.1 MMOL/L (ref 20–26)
HCO3 BLDA-SCNC: 47.6 MMOL/L (ref 20–26)
HCT VFR BLD AUTO: 51.8 % (ref 34–46.6)
HCT VFR BLD CALC: 43.3 % (ref 38–51)
HCT VFR BLD CALC: 43.4 % (ref 38–51)
HCT VFR BLD CALC: 45 % (ref 38–51)
HCT VFR BLD CALC: 46.2 % (ref 38–51)
HGB BLD-MCNC: 14.4 G/DL (ref 12–15.9)
HGB BLDA-MCNC: 14.1 G/DL (ref 13.5–17.5)
HGB BLDA-MCNC: 14.2 G/DL (ref 13.5–17.5)
HGB BLDA-MCNC: 14.7 G/DL (ref 13.5–17.5)
HGB BLDA-MCNC: 15.1 G/DL (ref 13.5–17.5)
HOLD SPECIMEN: NORMAL
HOLD SPECIMEN: NORMAL
HYPOCHROMIA BLD QL: NORMAL
IMM GRANULOCYTES # BLD AUTO: 0.06 10*3/MM3 (ref 0–0.05)
IMM GRANULOCYTES NFR BLD AUTO: 0.8 % (ref 0–0.5)
INHALED O2 CONCENTRATION: 100 %
INHALED O2 CONCENTRATION: 21 %
INHALED O2 CONCENTRATION: 50 %
INHALED O2 CONCENTRATION: 50 %
IPAP: 16
IPAP: 30
LDH SERPL-CCNC: 254 U/L (ref 135–214)
LYMPHOCYTES # BLD AUTO: 1.14 10*3/MM3 (ref 0.7–3.1)
LYMPHOCYTES NFR BLD AUTO: 14.9 % (ref 19.6–45.3)
Lab: ABNORMAL
MACROCYTES BLD QL SMEAR: NORMAL
MCH RBC QN AUTO: 29.4 PG (ref 26.6–33)
MCHC RBC AUTO-ENTMCNC: 27.8 G/DL (ref 31.5–35.7)
MCV RBC AUTO: 105.9 FL (ref 79–97)
METHGB BLD QL: 0 % (ref 0–3)
METHGB BLD QL: 0.1 % (ref 0–3)
METHGB BLD QL: 0.2 % (ref 0–3)
METHGB BLD QL: 0.3 % (ref 0–3)
MODALITY: ABNORMAL
MONOCYTES # BLD AUTO: 0.46 10*3/MM3 (ref 0.1–0.9)
MONOCYTES NFR BLD AUTO: 6 % (ref 5–12)
NEUTROPHILS NFR BLD AUTO: 5.79 10*3/MM3 (ref 1.7–7)
NEUTROPHILS NFR BLD AUTO: 75.8 % (ref 42.7–76)
NOTE: ABNORMAL
NOTIFIED BY: ABNORMAL
NOTIFIED WHO: ABNORMAL
NRBC BLD AUTO-RTO: 0 /100 WBC (ref 0–0.2)
NT-PROBNP SERPL-MCNC: ABNORMAL PG/ML (ref 0–900)
OXYHGB MFR BLDV: 47.6 % (ref 94–99)
OXYHGB MFR BLDV: 86.2 % (ref 94–99)
OXYHGB MFR BLDV: 88.4 % (ref 94–99)
OXYHGB MFR BLDV: 98.3 % (ref 94–99)
PCO2 BLDA: 104 MM HG (ref 35–45)
PCO2 BLDA: 79.5 MM HG (ref 35–45)
PCO2 BLDA: 93.2 MM HG (ref 35–45)
PCO2 BLDA: 98.2 MM HG (ref 35–45)
PCO2 TEMP ADJ BLD: ABNORMAL MM[HG]
PEEP RESPIRATORY: 5 CM[H2O]
PH BLDA: 7.26 PH UNITS (ref 7.35–7.45)
PH BLDA: 7.29 PH UNITS (ref 7.35–7.45)
PH BLDA: 7.32 PH UNITS (ref 7.35–7.45)
PH BLDA: 7.36 PH UNITS (ref 7.35–7.45)
PH, TEMP CORRECTED: ABNORMAL
PLAT MORPH BLD: NORMAL
PLATELET # BLD AUTO: 170 10*3/MM3 (ref 140–450)
PMV BLD AUTO: 12.5 FL (ref 6–12)
PO2 BLDA: 277 MM HG (ref 83–108)
PO2 BLDA: 31.7 MM HG (ref 83–108)
PO2 BLDA: 63.5 MM HG (ref 83–108)
PO2 BLDA: 72.1 MM HG (ref 83–108)
PO2 TEMP ADJ BLD: ABNORMAL MM[HG]
POTASSIUM SERPL-SCNC: 4.4 MMOL/L (ref 3.5–5.2)
PROCALCITONIN SERPL-MCNC: 0.09 NG/ML (ref 0–0.25)
PROT SERPL-MCNC: 7.7 G/DL (ref 6–8.5)
RBC # BLD AUTO: 4.89 10*6/MM3 (ref 3.77–5.28)
SAO2 % BLDCOA: 48.8 % (ref 94–99)
SAO2 % BLDCOA: 88.2 % (ref 94–99)
SAO2 % BLDCOA: 90.7 % (ref 94–99)
SAO2 % BLDCOA: >99.2 % (ref 94–99)
SARS-COV-2 RDRP RESP QL NAA+PROBE: NOT DETECTED
SET MECH RESP RATE: 18
SET MECH RESP RATE: 20
SET MECH RESP RATE: 20
SODIUM SERPL-SCNC: 142 MMOL/L (ref 136–145)
TROPONIN T SERPL-MCNC: <0.01 NG/ML (ref 0–0.03)
VENTILATOR MODE: ABNORMAL
VT ON VENT VENT: 500 ML
WBC # BLD AUTO: 7.64 10*3/MM3 (ref 3.4–10.8)
WHOLE BLOOD HOLD SPECIMEN: NORMAL
WHOLE BLOOD HOLD SPECIMEN: NORMAL

## 2020-07-20 PROCEDURE — 99291 CRITICAL CARE FIRST HOUR: CPT

## 2020-07-20 PROCEDURE — 93005 ELECTROCARDIOGRAM TRACING: CPT | Performed by: PHYSICIAN ASSISTANT

## 2020-07-20 PROCEDURE — 85007 BL SMEAR W/DIFF WBC COUNT: CPT | Performed by: PHYSICIAN ASSISTANT

## 2020-07-20 PROCEDURE — 94799 UNLISTED PULMONARY SVC/PX: CPT

## 2020-07-20 PROCEDURE — 71045 X-RAY EXAM CHEST 1 VIEW: CPT | Performed by: RADIOLOGY

## 2020-07-20 PROCEDURE — 96376 TX/PRO/DX INJ SAME DRUG ADON: CPT

## 2020-07-20 PROCEDURE — 83615 LACTATE (LD) (LDH) ENZYME: CPT | Performed by: PHYSICIAN ASSISTANT

## 2020-07-20 PROCEDURE — 80053 COMPREHEN METABOLIC PANEL: CPT | Performed by: PHYSICIAN ASSISTANT

## 2020-07-20 PROCEDURE — 94002 VENT MGMT INPAT INIT DAY: CPT

## 2020-07-20 PROCEDURE — 96375 TX/PRO/DX INJ NEW DRUG ADDON: CPT

## 2020-07-20 PROCEDURE — 93010 ELECTROCARDIOGRAM REPORT: CPT | Performed by: INTERNAL MEDICINE

## 2020-07-20 PROCEDURE — 36600 WITHDRAWAL OF ARTERIAL BLOOD: CPT

## 2020-07-20 PROCEDURE — 99285 EMERGENCY DEPT VISIT HI MDM: CPT

## 2020-07-20 PROCEDURE — 85025 COMPLETE CBC W/AUTO DIFF WBC: CPT | Performed by: PHYSICIAN ASSISTANT

## 2020-07-20 PROCEDURE — 83605 ASSAY OF LACTIC ACID: CPT | Performed by: PHYSICIAN ASSISTANT

## 2020-07-20 PROCEDURE — 25010000002 MIDAZOLAM PER 1 MG: Performed by: EMERGENCY MEDICINE

## 2020-07-20 PROCEDURE — 84484 ASSAY OF TROPONIN QUANT: CPT | Performed by: PHYSICIAN ASSISTANT

## 2020-07-20 PROCEDURE — 86140 C-REACTIVE PROTEIN: CPT | Performed by: PHYSICIAN ASSISTANT

## 2020-07-20 PROCEDURE — 71045 X-RAY EXAM CHEST 1 VIEW: CPT

## 2020-07-20 PROCEDURE — 25010000002 MIDAZOLAM PER 1 MG: Performed by: FAMILY MEDICINE

## 2020-07-20 PROCEDURE — 96365 THER/PROPH/DIAG IV INF INIT: CPT

## 2020-07-20 PROCEDURE — 82805 BLOOD GASES W/O2 SATURATION: CPT

## 2020-07-20 PROCEDURE — 83880 ASSAY OF NATRIURETIC PEPTIDE: CPT | Performed by: PHYSICIAN ASSISTANT

## 2020-07-20 PROCEDURE — 87040 BLOOD CULTURE FOR BACTERIA: CPT | Performed by: PHYSICIAN ASSISTANT

## 2020-07-20 PROCEDURE — 84145 PROCALCITONIN (PCT): CPT | Performed by: PHYSICIAN ASSISTANT

## 2020-07-20 PROCEDURE — 31500 INSERT EMERGENCY AIRWAY: CPT

## 2020-07-20 PROCEDURE — 94660 CPAP INITIATION&MGMT: CPT

## 2020-07-20 PROCEDURE — 94640 AIRWAY INHALATION TREATMENT: CPT

## 2020-07-20 PROCEDURE — 82375 ASSAY CARBOXYHB QUANT: CPT

## 2020-07-20 PROCEDURE — 87635 SARS-COV-2 COVID-19 AMP PRB: CPT | Performed by: PHYSICIAN ASSISTANT

## 2020-07-20 PROCEDURE — 71250 CT THORAX DX C-: CPT

## 2020-07-20 PROCEDURE — 83050 HGB METHEMOGLOBIN QUAN: CPT

## 2020-07-20 PROCEDURE — C9803 HOPD COVID-19 SPEC COLLECT: HCPCS

## 2020-07-20 PROCEDURE — 96366 THER/PROPH/DIAG IV INF ADDON: CPT

## 2020-07-20 RX ORDER — ALBUTEROL SULFATE 90 UG/1
2 AEROSOL, METERED RESPIRATORY (INHALATION)
Status: DISCONTINUED | OUTPATIENT
Start: 2020-07-20 | End: 2020-07-20 | Stop reason: HOSPADM

## 2020-07-20 RX ORDER — MIDAZOLAM HCL IN 0.9 % NACL/PF 1 MG/ML
1-10 PLASTIC BAG, INJECTION (ML) INTRAVENOUS
Status: DISCONTINUED | OUTPATIENT
Start: 2020-07-20 | End: 2020-07-20 | Stop reason: HOSPADM

## 2020-07-20 RX ORDER — MIDAZOLAM HCL IN 0.9 % NACL/PF 1 MG/ML
PLASTIC BAG, INJECTION (ML) INTRAVENOUS
Status: COMPLETED
Start: 2020-07-20 | End: 2020-07-20

## 2020-07-20 RX ORDER — ALBUTEROL SULFATE 90 UG/1
2 AEROSOL, METERED RESPIRATORY (INHALATION)
Status: DISCONTINUED | OUTPATIENT
Start: 2020-07-20 | End: 2020-07-20

## 2020-07-20 RX ORDER — SODIUM CHLORIDE 0.9 % (FLUSH) 0.9 %
10 SYRINGE (ML) INJECTION AS NEEDED
Status: DISCONTINUED | OUTPATIENT
Start: 2020-07-20 | End: 2020-07-20 | Stop reason: HOSPADM

## 2020-07-20 RX ORDER — METHYLPREDNISOLONE SODIUM SUCCINATE 125 MG/2ML
125 INJECTION, POWDER, LYOPHILIZED, FOR SOLUTION INTRAMUSCULAR; INTRAVENOUS ONCE
Status: DISCONTINUED | OUTPATIENT
Start: 2020-07-20 | End: 2020-07-20 | Stop reason: HOSPADM

## 2020-07-20 RX ORDER — MIDAZOLAM HYDROCHLORIDE 1 MG/ML
4 INJECTION INTRAMUSCULAR; INTRAVENOUS ONCE
Status: COMPLETED | OUTPATIENT
Start: 2020-07-20 | End: 2020-07-20

## 2020-07-20 RX ORDER — ETOMIDATE 2 MG/ML
40 INJECTION INTRAVENOUS ONCE
Status: COMPLETED | OUTPATIENT
Start: 2020-07-20 | End: 2020-07-20

## 2020-07-20 RX ORDER — MIDAZOLAM HYDROCHLORIDE 1 MG/ML
2 INJECTION INTRAMUSCULAR; INTRAVENOUS ONCE
Status: COMPLETED | OUTPATIENT
Start: 2020-07-20 | End: 2020-07-20

## 2020-07-20 RX ORDER — IPRATROPIUM BROMIDE AND ALBUTEROL SULFATE 2.5; .5 MG/3ML; MG/3ML
3 SOLUTION RESPIRATORY (INHALATION) ONCE
Status: COMPLETED | OUTPATIENT
Start: 2020-07-20 | End: 2020-07-20

## 2020-07-20 RX ADMIN — ETOMIDATE 40 MG: 2 INJECTION, SOLUTION INTRAVENOUS at 07:02

## 2020-07-20 RX ADMIN — ALBUTEROL SULFATE 2 PUFF: 90 AEROSOL, METERED RESPIRATORY (INHALATION) at 02:32

## 2020-07-20 RX ADMIN — IPRATROPIUM BROMIDE AND ALBUTEROL SULFATE 3 ML: .5; 3 SOLUTION RESPIRATORY (INHALATION) at 05:44

## 2020-07-20 RX ADMIN — Medication 1 MG/HR: at 07:13

## 2020-07-20 RX ADMIN — MIDAZOLAM HYDROCHLORIDE 4 MG: 1 INJECTION, SOLUTION INTRAMUSCULAR; INTRAVENOUS at 07:00

## 2020-07-20 RX ADMIN — MIDAZOLAM HYDROCHLORIDE 2 MG: 1 INJECTION, SOLUTION INTRAMUSCULAR; INTRAVENOUS at 09:38

## 2020-07-20 NOTE — ED PROVIDER NOTES
Subjective   Patient is a 54-year-old female with COPD, CHF, diabetes, coronary artery disease, hypertension, and seizure disorder that presents the ED with complaints of shortness of breath over the last week that is gotten progressively worse in the last day.  She states this evening she became very short of breath and was having difficulty breathing.  She also complains of nonproductive cough.  She denies chest pain, fever, chills, nausea, vomiting, abdominal pain, or dysuria.  Patient denies any recent travel or sick contacts.      History provided by:  Patient   used: No    Shortness of Breath   Severity:  Moderate  Onset quality:  Sudden  Duration:  1 day  Timing:  Constant  Progression:  Worsening  Chronicity:  Recurrent  Context: activity and smoke exposure    Relieved by:  Nothing  Worsened by:  Nothing  Ineffective treatments:  None tried  Associated symptoms: wheezing    Associated symptoms: no abdominal pain, no chest pain, no cough, no ear pain, no fever, no headaches, no sore throat, no sputum production and no vomiting        Review of Systems   Constitutional: Negative.  Negative for fever.   HENT: Negative.  Negative for congestion, ear discharge, ear pain, postnasal drip, rhinorrhea, sinus pressure, sinus pain, sneezing, sore throat, tinnitus and trouble swallowing.    Eyes: Negative.  Negative for pain, discharge, redness and itching.   Respiratory: Positive for shortness of breath and wheezing. Negative for cough and sputum production.    Cardiovascular: Positive for leg swelling. Negative for chest pain.   Gastrointestinal: Negative.  Negative for abdominal pain, diarrhea, nausea and vomiting.   Endocrine: Negative.    Genitourinary: Negative.  Negative for dysuria, flank pain, frequency and urgency.   Musculoskeletal: Negative.    Skin: Negative.    Neurological: Negative.  Negative for dizziness, syncope, weakness, numbness and headaches.   Psychiatric/Behavioral: Negative.   Negative for confusion.   All other systems reviewed and are negative.      Past Medical History:   Diagnosis Date   • Arthritis    • Asthma    • CHF (congestive heart failure) (CMS/HCC)    • COPD (chronic obstructive pulmonary disease) (CMS/HCC)    • Coronary artery disease    • Diabetes mellitus (CMS/HCC)    • Hypertension    • Seizures (CMS/HCC)        Allergies   Allergen Reactions   • Amoxicillin Anaphylaxis   • Contrast Dye Other (See Comments)     Pt states that she dies   • Other Itching     STEROIDS   • Codeine Hives   • Latex Itching   • Rocephin [Ceftriaxone] Itching       Past Surgical History:   Procedure Laterality Date   • HYSTERECTOMY         Family History   Problem Relation Age of Onset   • Breast cancer Paternal Aunt    • Heart disease Mother    • Heart disease Father    • Heart disease Sister    • Heart disease Brother        Social History     Socioeconomic History   • Marital status:      Spouse name: Not on file   • Number of children: Not on file   • Years of education: Not on file   • Highest education level: Not on file   Tobacco Use   • Smoking status: Former Smoker     Packs/day: 0.25     Years: 15.00     Pack years: 3.75     Types: Cigarettes     Last attempt to quit: 10/15/2019     Years since quittin.7   • Smokeless tobacco: Never Used   Substance and Sexual Activity   • Alcohol use: No   • Drug use: Defer   • Sexual activity: Defer           Objective   Physical Exam   Constitutional: She is oriented to person, place, and time. She appears well-developed and well-nourished. No distress.   HENT:   Head: Normocephalic and atraumatic.   Right Ear: External ear normal.   Left Ear: External ear normal.   Nose: Nose normal.   Eyes: Pupils are equal, round, and reactive to light. Conjunctivae and EOM are normal.   Neck: Normal range of motion. Neck supple. No JVD present. No tracheal deviation present.   Cardiovascular: Normal rate, regular rhythm, normal heart sounds, intact  distal pulses and normal pulses.   No murmur heard.  Pulmonary/Chest: Effort normal. No respiratory distress. She has wheezes.   Abdominal: Soft. Bowel sounds are normal. She exhibits no distension and no ascites. There is no tenderness. There is no rebound and no guarding.   Musculoskeletal: Normal range of motion. She exhibits no deformity.        Right lower leg: She exhibits edema.        Left lower leg: She exhibits edema.   Neurological: She is alert and oriented to person, place, and time. No cranial nerve deficit.   Skin: Skin is warm and dry. No rash noted. She is not diaphoretic. No erythema. No pallor.   Psychiatric: She has a normal mood and affect. Her behavior is normal. Thought content normal.   Nursing note and vitals reviewed.      Procedures           ED Course  ED Course as of Jul 20 0557   Mon Jul 20, 2020   0400 Discussed case with Dr. Oquendo.    []   0418    IMPRESSION:  Cardiomegaly with suspected mild vascular congestion.     Signer Name: Glynn Doll MD   Signed: 7/20/2020 4:06 AM   XR Chest AP []   0544 Discuss case with  at [facility].  He accepts patient for transfer and admissions for treatment of acute respiratory failure with hypercapnia.He recommends changing her BiPAP settings to 30/5 rate of 20 at 50% and repeating ABG in 30 minutes.  He recommends if no significant improvement to intubate prior to transfer    []      ED Course User Index  [] Toña Comer PA-C                                           [facility]    Final diagnoses:   Acute respiratory failure with hypercapnia (CMS/[facility])            Toña Comer PA-C  07/20/20 0501

## 2020-07-20 NOTE — ED NOTES
[facility]ed [facility] to [facility] pt [facility] to [facility]. C will call back.      Claire De La Rosa  07/20/20 6329

## 2020-07-20 NOTE — ED NOTES
Patient being transferred to [place] and EMS is here for transport patient is getting agitated at this time provider aware and orders placed.     Robert Guzman RN  07/20/20 0916

## 2020-07-20 NOTE — ED NOTES
Pt son called for information; pt gave verbal permission to share PHI; son updated on condition and plan of care     Frieda Moreland RN  07/20/20 8985

## 2020-07-20 NOTE — ED NOTES
Patient being transferred to external facility in back of [facility] ambulance at this time.  Robert Guzman RN  07/20/20 0952       Robert Guzman RN  07/20/20 1046

## 2020-07-20 NOTE — ED NOTES
Patient resting in bed with ett tube and og tube patient and iv meds flowing in patient IVs. Patient is sedated and Provider verbalized no need for stout cath at this time. JHONATAN VANEGAS [facility].     Robert Guzman, YOLANDA  07/20/20 0944       Robert Guzman RN  07/20/20 0976
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
## 2020-07-20 NOTE — ED NOTES
Conrado Berkowitz; patients son; [phone] to be contacted for anything  .      Kesha Lopez RN  07/20/20 4212

## 2020-07-20 NOTE — ED NOTES
Calling [facility] for transport to [facility].     Aaliyah Sheldon  07/20/20 0689

## 2020-07-20 NOTE — ED NOTES
1st attempt to call [facility] to call report was unsuccessful     Frieda Moreland, RN  07/20/20 0752

## 2020-07-20 NOTE — ED NOTES
[facility] said it is shift change so [facility] will call back after 0800 but he doesn't see a problem with the next shift being able to transport them.  [facility] will call back to confirm.      Claire De La Rosa  07/20/20 9349

## 2020-07-20 NOTE — ED NOTES
Called [facility] and had hospitalist paged for RETA Comer.     Aaliyah Sheldon  07/20/20 8933

## 2020-07-20 NOTE — ED NOTES
Pt O2 sats at 90%; RT contacted to increase FIO2 to 50%; will continue to Frieda Jade, YOLANDA  07/20/20 6158

## 2020-07-20 NOTE — ED PROVIDER NOTES
Subjective   History of Present Illness    Review of Systems    Past Medical History:   Diagnosis Date   • Arthritis    • Asthma    • CHF (congestive heart failure) (CMS/[facility])    • COPD (chronic obstructive pulmonary disease) (CMS/[facility])    • Coronary artery disease    • Diabetes mellitus (CMS/HCC)    • Hypertension    • Seizures (CMS/HCC)        Allergies   Allergen Reactions   • Amoxicillin Anaphylaxis   • Contrast Dye Other (See Comments)     Pt states that she dies   • Other Itching     STEROIDS   • Codeine Hives   • Latex Itching   • Rocephin [Ceftriaxone] Itching       Past Surgical History:   Procedure Laterality Date   • HYSTERECTOMY         Family History   Problem Relation Age of Onset   • Breast cancer Paternal Aunt    • Heart disease Mother    • Heart disease Father    • Heart disease Sister    • Heart disease Brother        Social History     Socioeconomic History   • Marital status:      Spouse name: Not on file   • Number of children: Not on file   • Years of education: Not on file   • Highest education level: Not on file   Tobacco Use   • Smoking status: Former Smoker     Packs/day: 0.25     Years: 15.00     Pack years: 3.75     Types: Cigarettes     Last attempt to quit: 10/15/2019     Years since quittin.7   • Smokeless tobacco: Never Used   Substance and Sexual Activity   • Alcohol use: No   • Drug use: Defer   • Sexual activity: Defer           Objective   Physical Exam    Intubation  Date/Time: 2020 7:21 AM  Performed by: Toña Oquendo DO  Authorized by: Toña Oquendo DO     Consent:     Consent obtained:  Verbal    Consent given by:  Patient    Risks discussed:  Aspiration, bleeding, brain injury, death, hypoxia, dental trauma, laryngeal injury and pneumothorax    Alternatives discussed:  Delayed treatment  Pre-procedure details:     Patient status:  Awake    Mallampati score:  I    Pretreatment medications:  Midazolam  Procedure details:     Preoxygenation:   Bag valve mask    CPR in progress: no      Intubation method:  Oral    Oral intubation technique:  Direct    Laryngoscope blade:  Marques 4    Tube size (mm):  7.5    Tube type:  Cuffed    Number of attempts:  2    Ventilation between attempts: yes      Cricoid pressure: yes    Placement assessment:     ETT to lip:  22    Tube secured with:  Adhesive tape and ETT prado    Placement verification: chest rise, CXR verification and direct visualization      CXR findings:  ETT in proper place  Post-procedure details:     Patient tolerance of procedure:  Tolerated well, no immediate complications               ED Course  ED Course as of Jul 20 0720 Mon Jul 20, 2020   0400 Discussed case with Dr. Oquendo.    []   0418    IMPRESSION:  Cardiomegaly with suspected mild vascular congestion.     Signer Name: Glynn Doll MD   Signed: 7/20/2020 4:06 AM   XR Chest AP []   0344 Discuss case with  at [facility].  He accepts patient for transfer and admissions for treatment of acute respiratory failure with hypercapnia.He recommends changing her BiPAP settings to 30/5 rate of 20 at 50% and repeating ABG in 30 minutes.  He recommends if no significant improvement to intubate prior to transfer    []      ED Course User Index  [] Toña Comer PA-C                                           MDM  Number of Diagnoses or Management Options  Acute respiratory failure with hypercapnia (CMS/HCC): new and requires workup     Amount and/or Complexity of Data Reviewed  Decide to obtain previous medical records or to obtain history from someone other than the patient: yes        Final diagnoses:   Acute respiratory failure with hypercapnia (CMS/HCC)            Toña Oquendo DO  07/20/20 0071

## 2020-07-25 LAB
BACTERIA SPEC AEROBE CULT: NORMAL
BACTERIA SPEC AEROBE CULT: NORMAL

## 2020-11-28 ENCOUNTER — HOSPITAL ENCOUNTER (EMERGENCY)
Facility: HOSPITAL | Age: 55
Discharge: LEFT BEFORE DISCHARGE | End: 2020-11-29

## 2020-11-29 VITALS
OXYGEN SATURATION: 95 % | WEIGHT: 180 LBS | HEIGHT: 60 IN | SYSTOLIC BLOOD PRESSURE: 130 MMHG | RESPIRATION RATE: 16 BRPM | HEART RATE: 90 BPM | BODY MASS INDEX: 35.34 KG/M2 | TEMPERATURE: 98.3 F | DIASTOLIC BLOOD PRESSURE: 74 MMHG

## 2021-09-20 ENCOUNTER — HOSPITAL ENCOUNTER (INPATIENT)
Dept: HOSPITAL 79 - ER1 | Age: 56
LOS: 24 days | Discharge: NURSING, REHAB OR LONG-TERM CARE | DRG: 871 | End: 2021-10-14
Attending: INTERNAL MEDICINE | Admitting: EMERGENCY MEDICINE
Payer: COMMERCIAL

## 2021-09-20 VITALS — BODY MASS INDEX: 39.56 KG/M2 | HEIGHT: 62 IN | WEIGHT: 215 LBS

## 2021-09-20 DIAGNOSIS — J96.22: ICD-10-CM

## 2021-09-20 DIAGNOSIS — E87.1: ICD-10-CM

## 2021-09-20 DIAGNOSIS — E11.9: ICD-10-CM

## 2021-09-20 DIAGNOSIS — I50.32: ICD-10-CM

## 2021-09-20 DIAGNOSIS — J44.1: ICD-10-CM

## 2021-09-20 DIAGNOSIS — Z82.49: ICD-10-CM

## 2021-09-20 DIAGNOSIS — G93.41: ICD-10-CM

## 2021-09-20 DIAGNOSIS — Z90.710: ICD-10-CM

## 2021-09-20 DIAGNOSIS — J15.9: ICD-10-CM

## 2021-09-20 DIAGNOSIS — G40.909: ICD-10-CM

## 2021-09-20 DIAGNOSIS — Z79.4: ICD-10-CM

## 2021-09-20 DIAGNOSIS — G89.29: ICD-10-CM

## 2021-09-20 DIAGNOSIS — J98.19: ICD-10-CM

## 2021-09-20 DIAGNOSIS — J12.82: ICD-10-CM

## 2021-09-20 DIAGNOSIS — Z87.891: ICD-10-CM

## 2021-09-20 DIAGNOSIS — I11.0: ICD-10-CM

## 2021-09-20 DIAGNOSIS — K59.00: ICD-10-CM

## 2021-09-20 DIAGNOSIS — Z88.5: ICD-10-CM

## 2021-09-20 DIAGNOSIS — J44.0: ICD-10-CM

## 2021-09-20 DIAGNOSIS — E66.2: ICD-10-CM

## 2021-09-20 DIAGNOSIS — B85.2: ICD-10-CM

## 2021-09-20 DIAGNOSIS — Z88.1: ICD-10-CM

## 2021-09-20 DIAGNOSIS — I27.20: ICD-10-CM

## 2021-09-20 DIAGNOSIS — Z80.3: ICD-10-CM

## 2021-09-20 DIAGNOSIS — A41.89: Primary | ICD-10-CM

## 2021-09-20 DIAGNOSIS — J96.21: ICD-10-CM

## 2021-09-20 DIAGNOSIS — Z91.040: ICD-10-CM

## 2021-09-20 DIAGNOSIS — E87.3: ICD-10-CM

## 2021-09-20 DIAGNOSIS — I08.1: ICD-10-CM

## 2021-09-20 DIAGNOSIS — U07.1: ICD-10-CM

## 2021-09-20 DIAGNOSIS — I25.10: ICD-10-CM

## 2021-09-20 LAB
BUN/CREATININE RATIO: 24 (ref 0–10)
HGB BLD-MCNC: 13.1 GM/DL (ref 12.3–15.3)
RED BLOOD COUNT: 4.27 M/UL (ref 4–5.1)
WHITE BLOOD COUNT: 6.6 K/UL (ref 4.5–11)

## 2021-09-20 PROCEDURE — C9113 INJ PANTOPRAZOLE SODIUM, VIA: HCPCS

## 2021-09-20 PROCEDURE — U0002 COVID-19 LAB TEST NON-CDC: HCPCS

## 2021-09-20 PROCEDURE — XW033E5 INTRODUCTION OF REMDESIVIR ANTI-INFECTIVE INTO PERIPHERAL VEIN, PERCUTANEOUS APPROACH, NEW TECHNOLOGY GROUP 5: ICD-10-PCS | Performed by: EMERGENCY MEDICINE

## 2021-09-20 PROCEDURE — 3E0333Z INTRODUCTION OF ANTI-INFLAMMATORY INTO PERIPHERAL VEIN, PERCUTANEOUS APPROACH: ICD-10-PCS | Performed by: EMERGENCY MEDICINE

## 2021-09-20 PROCEDURE — 5A09557 ASSISTANCE WITH RESPIRATORY VENTILATION, GREATER THAN 96 CONSECUTIVE HOURS, CONTINUOUS POSITIVE AIRWAY PRESSURE: ICD-10-PCS | Performed by: EMERGENCY MEDICINE

## 2021-09-21 LAB
BUN/CREATININE RATIO: 26 (ref 0–10)
HGB BLD-MCNC: 11.2 GM/DL (ref 12.3–15.3)
RED BLOOD COUNT: 3.7 M/UL (ref 4–5.1)
WHITE BLOOD COUNT: 5 K/UL (ref 4.5–11)

## 2021-09-21 PROCEDURE — 8E0ZXY6 ISOLATION: ICD-10-PCS | Performed by: EMERGENCY MEDICINE

## 2021-09-22 LAB
BUN/CREATININE RATIO: 31 (ref 0–10)
HGB BLD-MCNC: 11.3 GM/DL (ref 12.3–15.3)
RED BLOOD COUNT: 3.85 M/UL (ref 4–5.1)
WHITE BLOOD COUNT: 8.6 K/UL (ref 4.5–11)

## 2021-09-23 LAB
BUN/CREATININE RATIO: 30 (ref 0–10)
HGB BLD-MCNC: 12.8 GM/DL (ref 12.3–15.3)
RED BLOOD COUNT: 4.21 M/UL (ref 4–5.1)
WHITE BLOOD COUNT: 7.1 K/UL (ref 4.5–11)

## 2021-09-23 NOTE — NUR
I INFORMED BOTH THE HOSPITALIST, DR. MERRILL, AND PULMONARY SPECIALIST, DR. VERDUGO, THAT PATIENTS OXYGEN SAT HAD FALLED TO 87% AND WAS TITERING BETWEEN
87-89%. DR. VERDUGO ASKED IF PATIENT WAS IN DISTRESS AT THIS TIME AND THAT HE
SAW PATIENT RECENTLY. PATIENT IS NOT IN DISTRESS AT THIS TIME.
KARTIK SUGGEST TO KEEP PATIENT UP IN THE BED
TO PROMOTE OXYGEN. MAY DISCUSS INTUBATION LATER TODAY.

## 2021-09-24 LAB
BUN/CREATININE RATIO: 26 (ref 0–10)
HGB BLD-MCNC: 11.9 GM/DL (ref 12.3–15.3)
RED BLOOD COUNT: 3.86 M/UL (ref 4–5.1)
WHITE BLOOD COUNT: 7.3 K/UL (ref 4.5–11)

## 2021-09-25 LAB
BUN/CREATININE RATIO: 32 (ref 0–10)
HGB BLD-MCNC: 12.3 GM/DL (ref 12.3–15.3)
RED BLOOD COUNT: 3.99 M/UL (ref 4–5.1)
WHITE BLOOD COUNT: 10.3 K/UL (ref 4.5–11)

## 2021-09-26 LAB
BUN/CREATININE RATIO: 26 (ref 0–10)
HGB BLD-MCNC: 12.7 GM/DL (ref 12.3–15.3)
RED BLOOD COUNT: 4.12 M/UL (ref 4–5.1)
WHITE BLOOD COUNT: 12.3 K/UL (ref 4.5–11)

## 2021-09-27 LAB
BUN/CREATININE RATIO: 27 (ref 0–10)
HGB BLD-MCNC: 13.4 GM/DL (ref 12.3–15.3)
RED BLOOD COUNT: 4.44 M/UL (ref 4–5.1)
WHITE BLOOD COUNT: 12 K/UL (ref 4.5–11)

## 2021-09-28 LAB
BUN/CREATININE RATIO: 25 (ref 0–10)
HGB BLD-MCNC: 12.4 GM/DL (ref 12.3–15.3)
RED BLOOD COUNT: 3.95 M/UL (ref 4–5.1)
WHITE BLOOD COUNT: 10.6 K/UL (ref 4.5–11)

## 2021-09-29 LAB — BUN/CREATININE RATIO: 35 (ref 0–10)

## 2021-09-29 NOTE — NUR
WHEN PT ARRIVED TO FLOOR, PT WAS PLACED ON 15 L/MIN HFNC WITH O2 SAT 94%. PT
DESATS WITH MOVEMENT AND EXCERTION. PRIMARY RN SUGGESTED TO PT TO WEAR BIPAP
TO SLEEP, PT BECAME UPSET AND PT REFUSED TO WEAR AIRVO AND BIPAP. EDUCATION
PROVIDED. WCTM

## 2021-09-30 LAB — BUN/CREATININE RATIO: 32 (ref 0–10)

## 2021-10-01 LAB
BUN/CREATININE RATIO: 31 (ref 0–10)
HGB BLD-MCNC: 12.4 GM/DL (ref 12.3–15.3)
RED BLOOD COUNT: 4.07 M/UL (ref 4–5.1)
WHITE BLOOD COUNT: 8.8 K/UL (ref 4.5–11)
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
## 2021-10-03 LAB — BUN/CREATININE RATIO: 31 (ref 0–10)

## 2021-10-04 LAB
BUN/CREATININE RATIO: 33 (ref 0–10)
HGB BLD-MCNC: 11.9 GM/DL (ref 12.3–15.3)
RED BLOOD COUNT: 3.9 M/UL (ref 4–5.1)
WHITE BLOOD COUNT: 8.9 K/UL (ref 4.5–11)

## 2021-10-05 LAB
BUN/CREATININE RATIO: 32 (ref 0–10)
HGB BLD-MCNC: 11.8 GM/DL (ref 12.3–15.3)
RED BLOOD COUNT: 3.83 M/UL (ref 4–5.1)
WHITE BLOOD COUNT: 8.7 K/UL (ref 4.5–11)

## 2021-10-06 LAB — BUN/CREATININE RATIO: 33 (ref 0–10)

## 2021-10-07 LAB
BUN/CREATININE RATIO: 25 (ref 0–10)
HGB BLD-MCNC: 11.3 GM/DL (ref 12.3–15.3)
RED BLOOD COUNT: 3.72 M/UL (ref 4–5.1)
WHITE BLOOD COUNT: 7.6 K/UL (ref 4.5–11)

## 2021-10-07 PROCEDURE — B24BZZZ ULTRASONOGRAPHY OF HEART WITH AORTA: ICD-10-PCS | Performed by: STUDENT IN AN ORGANIZED HEALTH CARE EDUCATION/TRAINING PROGRAM

## 2021-10-09 LAB
BUN/CREATININE RATIO: 22 (ref 0–10)
HGB BLD-MCNC: 12.2 GM/DL (ref 12.3–15.3)
RED BLOOD COUNT: 3.88 M/UL (ref 4–5.1)
WHITE BLOOD COUNT: 8.3 K/UL (ref 4.5–11)

## 2021-10-10 LAB
BUN/CREATININE RATIO: 25 (ref 0–10)
HGB BLD-MCNC: 11.8 GM/DL (ref 12.3–15.3)
RED BLOOD COUNT: 3.79 M/UL (ref 4–5.1)
WHITE BLOOD COUNT: 8.4 K/UL (ref 4.5–11)

## 2021-10-12 LAB
BUN/CREATININE RATIO: 32 (ref 0–10)
HGB BLD-MCNC: 11.9 GM/DL (ref 12.3–15.3)
RED BLOOD COUNT: 3.8 M/UL (ref 4–5.1)
WHITE BLOOD COUNT: 8.8 K/UL (ref 4.5–11)

## 2022-11-06 ENCOUNTER — APPOINTMENT (OUTPATIENT)
Dept: GENERAL RADIOLOGY | Facility: HOSPITAL | Age: 57
End: 2022-11-06

## 2022-11-06 ENCOUNTER — HOSPITAL ENCOUNTER (EMERGENCY)
Facility: HOSPITAL | Age: 57
Discharge: HOME | End: 2022-11-06
Attending: STUDENT IN AN ORGANIZED HEALTH CARE EDUCATION/TRAINING PROGRAM | Admitting: STUDENT IN AN ORGANIZED HEALTH CARE EDUCATION/TRAINING PROGRAM

## 2022-11-06 VITALS
RESPIRATION RATE: 18 BRPM | SYSTOLIC BLOOD PRESSURE: 130 MMHG | OXYGEN SATURATION: 95 % | WEIGHT: 174 LBS | DIASTOLIC BLOOD PRESSURE: 56 MMHG | HEIGHT: 61 IN | TEMPERATURE: 98.4 F | HEART RATE: 74 BPM | BODY MASS INDEX: 32.85 KG/M2

## 2022-11-06 DIAGNOSIS — J44.1 COPD EXACERBATION: ICD-10-CM

## 2022-11-06 DIAGNOSIS — I50.9 HEART FAILURE, UNSPECIFIED HF CHRONICITY, UNSPECIFIED HEART FAILURE TYPE: ICD-10-CM

## 2022-11-06 DIAGNOSIS — R60.9 EDEMA, UNSPECIFIED TYPE: Primary | ICD-10-CM

## 2022-11-06 LAB
ALBUMIN SERPL-MCNC: 3.6 G/DL (ref 3.5–5.2)
ALBUMIN/GLOB SERPL: 0.9 G/DL
ALP SERPL-CCNC: 78 U/L (ref 39–117)
ALT SERPL W P-5'-P-CCNC: 11 U/L (ref 1–33)
ANION GAP SERPL CALCULATED.3IONS-SCNC: 5.3 MMOL/L (ref 5–15)
AST SERPL-CCNC: 13 U/L (ref 1–32)
BASOPHILS # BLD AUTO: 0.04 10*3/MM3 (ref 0–0.2)
BASOPHILS NFR BLD AUTO: 0.4 % (ref 0–1.5)
BILIRUB SERPL-MCNC: 0.2 MG/DL (ref 0–1.2)
BUN SERPL-MCNC: 12 MG/DL (ref 6–20)
BUN/CREAT SERPL: 21.1 (ref 7–25)
CALCIUM SPEC-SCNC: 9.5 MG/DL (ref 8.6–10.5)
CHLORIDE SERPL-SCNC: 95 MMOL/L (ref 98–107)
CK SERPL-CCNC: 33 U/L (ref 20–180)
CO2 SERPL-SCNC: 39.7 MMOL/L (ref 22–29)
CREAT SERPL-MCNC: 0.57 MG/DL (ref 0.57–1)
CRP SERPL-MCNC: <0.3 MG/DL (ref 0–0.5)
DEPRECATED RDW RBC AUTO: 47.7 FL (ref 37–54)
EGFRCR SERPLBLD CKD-EPI 2021: 108.1 ML/MIN/1.73
EOSINOPHIL # BLD AUTO: 0.14 10*3/MM3 (ref 0–0.4)
EOSINOPHIL NFR BLD AUTO: 1.4 % (ref 0.3–6.2)
ERYTHROCYTE [DISTWIDTH] IN BLOOD BY AUTOMATED COUNT: 12.8 % (ref 12.3–15.4)
ERYTHROCYTE [SEDIMENTATION RATE] IN BLOOD: 80 MM/HR (ref 0–30)
GLOBULIN UR ELPH-MCNC: 4.1 GM/DL
GLUCOSE SERPL-MCNC: 123 MG/DL (ref 65–99)
HCT VFR BLD AUTO: 42.1 % (ref 34–46.6)
HGB BLD-MCNC: 12.3 G/DL (ref 12–15.9)
IMM GRANULOCYTES # BLD AUTO: 0.03 10*3/MM3 (ref 0–0.05)
IMM GRANULOCYTES NFR BLD AUTO: 0.3 % (ref 0–0.5)
LYMPHOCYTES # BLD AUTO: 1.35 10*3/MM3 (ref 0.7–3.1)
LYMPHOCYTES NFR BLD AUTO: 13.4 % (ref 19.6–45.3)
MAGNESIUM SERPL-MCNC: 1.9 MG/DL (ref 1.6–2.6)
MCH RBC QN AUTO: 29.6 PG (ref 26.6–33)
MCHC RBC AUTO-ENTMCNC: 29.2 G/DL (ref 31.5–35.7)
MCV RBC AUTO: 101.4 FL (ref 79–97)
MONOCYTES # BLD AUTO: 0.56 10*3/MM3 (ref 0.1–0.9)
MONOCYTES NFR BLD AUTO: 5.6 % (ref 5–12)
NEUTROPHILS NFR BLD AUTO: 7.97 10*3/MM3 (ref 1.7–7)
NEUTROPHILS NFR BLD AUTO: 78.9 % (ref 42.7–76)
NRBC BLD AUTO-RTO: 0 /100 WBC (ref 0–0.2)
NT-PROBNP SERPL-MCNC: 123.3 PG/ML (ref 0–900)
PLATELET # BLD AUTO: 154 10*3/MM3 (ref 140–450)
PMV BLD AUTO: 11.8 FL (ref 6–12)
POTASSIUM SERPL-SCNC: 4 MMOL/L (ref 3.5–5.2)
PROT SERPL-MCNC: 7.7 G/DL (ref 6–8.5)
RBC # BLD AUTO: 4.15 10*6/MM3 (ref 3.77–5.28)
SODIUM SERPL-SCNC: 140 MMOL/L (ref 136–145)
T4 FREE SERPL-MCNC: 1.17 NG/DL (ref 0.93–1.7)
TROPONIN T SERPL-MCNC: <0.01 NG/ML (ref 0–0.03)
TSH SERPL DL<=0.05 MIU/L-ACNC: 0.7 UIU/ML (ref 0.27–4.2)
WBC NRBC COR # BLD: 10.09 10*3/MM3 (ref 3.4–10.8)

## 2022-11-06 PROCEDURE — 71045 X-RAY EXAM CHEST 1 VIEW: CPT

## 2022-11-06 PROCEDURE — 93005 ELECTROCARDIOGRAM TRACING: CPT | Performed by: EMERGENCY MEDICINE

## 2022-11-06 PROCEDURE — 83880 ASSAY OF NATRIURETIC PEPTIDE: CPT | Performed by: EMERGENCY MEDICINE

## 2022-11-06 PROCEDURE — 84439 ASSAY OF FREE THYROXINE: CPT | Performed by: EMERGENCY MEDICINE

## 2022-11-06 PROCEDURE — 84484 ASSAY OF TROPONIN QUANT: CPT | Performed by: EMERGENCY MEDICINE

## 2022-11-06 PROCEDURE — 83735 ASSAY OF MAGNESIUM: CPT | Performed by: EMERGENCY MEDICINE

## 2022-11-06 PROCEDURE — 36415 COLL VENOUS BLD VENIPUNCTURE: CPT

## 2022-11-06 PROCEDURE — 86140 C-REACTIVE PROTEIN: CPT | Performed by: EMERGENCY MEDICINE

## 2022-11-06 PROCEDURE — 82550 ASSAY OF CK (CPK): CPT | Performed by: EMERGENCY MEDICINE

## 2022-11-06 PROCEDURE — 99284 EMERGENCY DEPT VISIT MOD MDM: CPT

## 2022-11-06 PROCEDURE — 85652 RBC SED RATE AUTOMATED: CPT | Performed by: EMERGENCY MEDICINE

## 2022-11-06 PROCEDURE — 80050 GENERAL HEALTH PANEL: CPT | Performed by: EMERGENCY MEDICINE

## 2022-11-06 PROCEDURE — 93010 ELECTROCARDIOGRAM REPORT: CPT | Performed by: INTERNAL MEDICINE

## 2022-11-06 RX ORDER — DOXYCYCLINE 100 MG/1
100 CAPSULE ORAL 2 TIMES DAILY
Qty: 14 CAPSULE | Refills: 0 | Status: SHIPPED | OUTPATIENT
Start: 2022-11-06 | End: 2022-11-13

## 2022-11-07 NOTE — DISCHARGE INSTRUCTIONS
Please follow-up with your primary care physician within the next 2 to 3 days.  Please take your antibiotic as prescribed.  Please also use 4 puffs every 6 hours for the next 2 days of your albuterol inhaler and then use as needed.  Please also utilize your compression stockings and discussed with your cardiologist regarding your water pill and progression of your heart failure.  Please return for any worsening symptoms such as difficulty breathing, worsening swelling, chest pain or any other concerns.

## 2022-11-07 NOTE — ED NOTES
MEDICAL SCREENING:    Reason for Visit: Edema    Patient initially seen in triage.  The patient was advised further evaluation and diagnostic testing will be needed, some of the treatment and testing will be initiated in the lobby in order to begin the process.  The patient will be returned to the waiting area for the time being and possibly be re-assessed by a subsequent ED provider.  The patient will be brought back to the treatment area in as timely manner as possible.       Kwame Luu MD  11/06/22 1952

## 2022-11-09 LAB
QT INTERVAL: 354 MS
QTC INTERVAL: 451 MS

## 2022-11-30 NOTE — NUR
PT REFUSED TYLENOL FOR TEMP .8. PLACED COLD RAGS ON BACK OF NECK Fluconazole Counseling:  Patient counseled regarding adverse effects of fluconazole including but not limited to headache, diarrhea, nausea, upset stomach, liver function test abnormalities, taste disturbance, and stomach pain.  There is a rare possibility of liver failure that can occur when taking fluconazole.  The patient understands that monitoring of LFTs and kidney function test may be required, especially at baseline. The patient verbalized understanding of the proper use and possible adverse effects of fluconazole.  All of the patient's questions and concerns were addressed.

## 2023-03-20 NOTE — ED PROVIDER NOTES
Subjective   History of Present Illness     Miss Colunga is a 58 yo female w/ PMH for CHF, COPD, DM, HTN presenting to the emergency department for Bilateral LE edema. Patient reports non exertional dyspnea but denies any chest pain. Patient denies fevers or other infectious like symptoms. No abdominal pain, N/V, bowel changes or urinary sx. Patient reports prior history of swelling in LE, she is on low dose water pill, Lasix 20 mg BID. She reports using her inhaler which has helped w/ dyspnea.     Review of Systems   Constitutional: Negative.  Negative for fever.   HENT: Negative.    Respiratory: Positive for shortness of breath.    Cardiovascular: Negative.  Negative for chest pain.   Gastrointestinal: Negative.  Negative for abdominal pain.   Endocrine: Negative.    Genitourinary: Negative.  Negative for dysuria.   Musculoskeletal:        BLE swelling   Skin: Negative.    Neurological: Negative.    Psychiatric/Behavioral: Negative.    All other systems reviewed and are negative.      Past Medical History:   Diagnosis Date   • Arthritis    • Asthma    • CHF (congestive heart failure) (CMS/HCC)    • COPD (chronic obstructive pulmonary disease) (CMS/[facility])    • Coronary artery disease    • Diabetes mellitus (CMS/HCC)    • Hypertension    • Seizures (CMS/HCC)        Allergies   Allergen Reactions   • Amoxicillin Anaphylaxis   • Contrast Dye Other (See Comments)     Pt states that she dies   • Other Itching     STEROIDS   • Codeine Hives   • Latex Itching   • Rocephin [Ceftriaxone] Itching       Past Surgical History:   Procedure Laterality Date   • HYSTERECTOMY         Family History   Problem Relation Age of Onset   • Breast cancer Paternal Aunt    • Heart disease Mother    • Heart disease Father    • Heart disease Sister    • Heart disease Brother        Social History     Socioeconomic History   • Marital status:    Tobacco Use   • Smoking status: Former     Packs/day: 0.25     Years: 15.00     Pack years:  3.75     Types: Cigarettes     Quit date: 10/15/2019     Years since quitting: 3.0   • Smokeless tobacco: Never   Substance and Sexual Activity   • Alcohol use: No   • Drug use: Defer   • Sexual activity: Defer           Objective   Physical Exam  Vitals and nursing note reviewed.   HENT:      Head: Normocephalic and atraumatic.      Right Ear: Tympanic membrane normal.      Left Ear: Tympanic membrane normal.      Nose: Nose normal. No congestion or rhinorrhea.      Mouth/Throat:      Mouth: Mucous membranes are moist.      Pharynx: No oropharyngeal exudate.   Eyes:      Extraocular Movements: Extraocular movements intact.      Pupils: Pupils are equal, round, and reactive to light.   Cardiovascular:      Rate and Rhythm: Normal rate and regular rhythm.      Pulses: Normal pulses.      Heart sounds: No murmur heard.  Pulmonary:      Effort: Pulmonary effort is normal.      Breath sounds: Normal breath sounds.   Abdominal:      General: Abdomen is flat. Bowel sounds are normal. There is no distension.   Musculoskeletal:         General: No swelling or tenderness. Normal range of motion.      Cervical back: Normal range of motion. No rigidity.   Skin:     General: Skin is warm.      Capillary Refill: Capillary refill takes less than 2 seconds.      Coloration: Skin is not jaundiced or pale.   Neurological:      Mental Status: She is alert and oriented to person, place, and time.         Procedures           ED Course                                           MDM  Number of Diagnoses or Management Options  COPD exacerbation (HCC): new and requires workup  Edema, unspecified type: new and requires workup  Heart failure, unspecified HF chronicity, unspecified heart failure type (HCC): new and requires workup     Amount and/or Complexity of Data Reviewed  Clinical lab tests: reviewed and ordered  Tests in the radiology section of CPT®: reviewed and ordered  Tests in the medicine section of CPT®: reviewed and  ordered  Review and summarize past medical records: yes  Independent visualization of images, tracings, or specimens: yes        Final diagnoses:   Edema, unspecified type   Heart failure, unspecified HF chronicity, unspecified heart failure type (HCC)   COPD exacerbation (HCC)       ED Disposition  ED Disposition     ED Disposition   Discharge    Condition   Stable    Comment   --             Colleen Nickerson, APRN  [id number] [facility] [address]  [place] [id number] [phone]    Go in 2 days  Medical evaluation         Where to Get Your Medications      These medications were sent to [facility] [id number] - [place] - [address] [facility] AT [address] [place] [address] - [phone]  - [phone]  [address] [facility] [place] [address]    Phone: [phone]   · doxycycline 100 MG capsule        Medication List      No changes were made to your prescriptions during this visit.          Yashira Dixon MD  11/09/22 0508     Labs/Imaging Studies Labs/Imaging Studies/Medications

## 2023-04-21 ENCOUNTER — TRANSCRIBE ORDERS (OUTPATIENT)
Dept: ADMINISTRATIVE | Facility: HOSPITAL | Age: 58
End: 2023-04-21
Payer: COMMERCIAL

## 2023-04-21 DIAGNOSIS — Z12.31 VISIT FOR SCREENING MAMMOGRAM: Primary | ICD-10-CM

## 2023-10-06 ENCOUNTER — TRANSCRIBE ORDERS (OUTPATIENT)
Dept: ADMINISTRATIVE | Facility: HOSPITAL | Age: 58
End: 2023-10-06
Payer: COMMERCIAL

## 2023-10-06 DIAGNOSIS — Z12.31 SCREENING MAMMOGRAM FOR BREAST CANCER: Primary | ICD-10-CM

## 2025-08-01 ENCOUNTER — APPOINTMENT (OUTPATIENT)
Dept: GENERAL RADIOLOGY | Facility: HOSPITAL | Age: 60
DRG: 871 | End: 2025-08-01
Payer: COMMERCIAL

## 2025-08-01 ENCOUNTER — APPOINTMENT (OUTPATIENT)
Dept: CT IMAGING | Facility: HOSPITAL | Age: 60
DRG: 871 | End: 2025-08-01
Payer: COMMERCIAL

## 2025-08-01 ENCOUNTER — HOSPITAL ENCOUNTER (INPATIENT)
Facility: HOSPITAL | Age: 60
LOS: 3 days | Discharge: HOME | DRG: 871 | End: 2025-08-04
Admitting: HOSPITALIST
Payer: COMMERCIAL

## 2025-08-01 DIAGNOSIS — J44.9 CHRONIC OBSTRUCTIVE PULMONARY DISEASE, UNSPECIFIED COPD TYPE: Chronic | ICD-10-CM

## 2025-08-01 DIAGNOSIS — J96.02 ACUTE RESPIRATORY FAILURE WITH HYPERCAPNIA: Primary | ICD-10-CM

## 2025-08-01 PROBLEM — J96.22 ACUTE ON CHRONIC RESPIRATORY FAILURE WITH HYPERCAPNIA: Status: ACTIVE | Noted: 2025-08-01

## 2025-08-01 LAB
A-A DO2: 101.5 MMHG (ref 0–300)
A-A DO2: 55.8 MMHG (ref 0–300)
ALBUMIN SERPL-MCNC: 3.8 G/DL (ref 3.5–5.2)
ALBUMIN/GLOB SERPL: 1 G/DL
ALP SERPL-CCNC: 85 U/L (ref 39–117)
ALT SERPL W P-5'-P-CCNC: 11 U/L (ref 1–33)
ANION GAP SERPL CALCULATED.3IONS-SCNC: 7.6 MMOL/L (ref 5–15)
ARTERIAL PATENCY WRIST A: POSITIVE
ARTERIAL PATENCY WRIST A: POSITIVE
AST SERPL-CCNC: 13 U/L (ref 1–32)
ATMOSPHERIC PRESS: 730 MMHG
ATMOSPHERIC PRESS: 732 MMHG
B PARAPERT DNA SPEC QL NAA+PROBE: NOT DETECTED
B PERT DNA SPEC QL NAA+PROBE: NOT DETECTED
BASE EXCESS BLDA CALC-SCNC: 11.7 MMOL/L (ref 0–2)
BASE EXCESS BLDA CALC-SCNC: 8.8 MMOL/L (ref 0–2)
BASOPHILS # BLD AUTO: 0.05 10*3/MM3 (ref 0–0.2)
BASOPHILS NFR BLD AUTO: 0.5 % (ref 0–1.5)
BDY SITE: ABNORMAL
BDY SITE: ABNORMAL
BILIRUB SERPL-MCNC: 0.2 MG/DL (ref 0–1.2)
BUN SERPL-MCNC: 16.3 MG/DL (ref 6–20)
BUN/CREAT SERPL: 36.2 (ref 7–25)
C PNEUM DNA NPH QL NAA+NON-PROBE: NOT DETECTED
CALCIUM SPEC-SCNC: 9.3 MG/DL (ref 8.6–10.5)
CHLORIDE SERPL-SCNC: 97 MMOL/L (ref 98–107)
CO2 BLDA-SCNC: 40.9 MMOL/L (ref 22–33)
CO2 BLDA-SCNC: 43.8 MMOL/L (ref 22–33)
CO2 SERPL-SCNC: 38.4 MMOL/L (ref 22–29)
COHGB MFR BLD: 1.3 % (ref 0–5)
COHGB MFR BLD: 2.4 % (ref 0–5)
CREAT SERPL-MCNC: 0.45 MG/DL (ref 0.57–1)
CRP SERPL-MCNC: 2.86 MG/DL (ref 0–0.5)
D DIMER PPP FEU-MCNC: 0.84 MCGFEU/ML (ref 0–0.57)
D-LACTATE SERPL-SCNC: 0.9 MMOL/L (ref 0.5–2)
DEPRECATED RDW RBC AUTO: 45.9 FL (ref 37–54)
EGFRCR SERPLBLD CKD-EPI 2021: 112.4 ML/MIN/1.73
EOSINOPHIL # BLD AUTO: 0.15 10*3/MM3 (ref 0–0.4)
EOSINOPHIL NFR BLD AUTO: 1.5 % (ref 0.3–6.2)
EPAP: 8
ERYTHROCYTE [DISTWIDTH] IN BLOOD BY AUTOMATED COUNT: 12.3 % (ref 12.3–15.4)
FLUAV SUBTYP SPEC NAA+PROBE: NOT DETECTED
FLUBV RNA NPH QL NAA+NON-PROBE: NOT DETECTED
GAS FLOW AIRWAY: 3 LPM
GEN 5 1HR TROPONIN T REFLEX: 11 NG/L
GLOBULIN UR ELPH-MCNC: 4 GM/DL
GLUCOSE BLDC GLUCOMTR-MCNC: 200 MG/DL (ref 70–130)
GLUCOSE SERPL-MCNC: 139 MG/DL (ref 65–99)
HADV DNA SPEC NAA+PROBE: NOT DETECTED
HBA1C MFR BLD: 5.93 % (ref 4.8–5.6)
HCO3 BLDA-SCNC: 38.2 MMOL/L (ref 20–26)
HCO3 BLDA-SCNC: 41.2 MMOL/L (ref 20–26)
HCOV 229E RNA SPEC QL NAA+PROBE: NOT DETECTED
HCOV HKU1 RNA SPEC QL NAA+PROBE: NOT DETECTED
HCOV NL63 RNA SPEC QL NAA+PROBE: NOT DETECTED
HCOV OC43 RNA SPEC QL NAA+PROBE: NOT DETECTED
HCT VFR BLD AUTO: 35.7 % (ref 34–46.6)
HCT VFR BLD CALC: 29.7 % (ref 38–51)
HCT VFR BLD CALC: 32.4 % (ref 38–51)
HGB BLD-MCNC: 10.1 G/DL (ref 12–15.9)
HGB BLDA-MCNC: 10.6 G/DL (ref 13.5–17.5)
HGB BLDA-MCNC: 9.7 G/DL (ref 13.5–17.5)
HMPV RNA NPH QL NAA+NON-PROBE: NOT DETECTED
HOLD SPECIMEN: NORMAL
HOLD SPECIMEN: NORMAL
HPIV1 RNA ISLT QL NAA+PROBE: NOT DETECTED
HPIV2 RNA SPEC QL NAA+PROBE: NOT DETECTED
HPIV3 RNA NPH QL NAA+PROBE: NOT DETECTED
HPIV4 P GENE NPH QL NAA+PROBE: NOT DETECTED
IMM GRANULOCYTES # BLD AUTO: 0.09 10*3/MM3 (ref 0–0.05)
IMM GRANULOCYTES NFR BLD AUTO: 0.9 % (ref 0–0.5)
INHALED O2 CONCENTRATION: 32 %
INHALED O2 CONCENTRATION: 40 %
IPAP: 18
LYMPHOCYTES # BLD AUTO: 1.31 10*3/MM3 (ref 0.7–3.1)
LYMPHOCYTES NFR BLD AUTO: 13.3 % (ref 19.6–45.3)
Lab: ABNORMAL
M PNEUMO IGG SER IA-ACNC: NOT DETECTED
MCH RBC QN AUTO: 29.1 PG (ref 26.6–33)
MCHC RBC AUTO-ENTMCNC: 28.3 G/DL (ref 31.5–35.7)
MCV RBC AUTO: 102.9 FL (ref 79–97)
METHGB BLD QL: 0.4 % (ref 0–3)
METHGB BLD QL: <-0.1 % (ref 0–3)
MODALITY: ABNORMAL
MODALITY: ABNORMAL
MONOCYTES # BLD AUTO: 0.78 10*3/MM3 (ref 0.1–0.9)
MONOCYTES NFR BLD AUTO: 7.9 % (ref 5–12)
NEUTROPHILS NFR BLD AUTO: 7.47 10*3/MM3 (ref 1.7–7)
NEUTROPHILS NFR BLD AUTO: 75.9 % (ref 42.7–76)
NOTIFIED BY: ABNORMAL
NOTIFIED BY: ABNORMAL
NOTIFIED WHO: ABNORMAL
NOTIFIED WHO: ABNORMAL
NRBC BLD AUTO-RTO: 0 /100 WBC (ref 0–0.2)
NT-PROBNP SERPL-MCNC: 106 PG/ML (ref 0–900)
OXYHGB MFR BLDV: 89.8 % (ref 94–99)
OXYHGB MFR BLDV: 91.6 % (ref 94–99)
PCO2 BLDA: 86.4 MM HG (ref 35–45)
PCO2 BLDA: 87.6 MM HG (ref 35–45)
PCO2 TEMP ADJ BLD: ABNORMAL MM[HG]
PCO2 TEMP ADJ BLD: ABNORMAL MM[HG]
PH BLDA: 7.25 PH UNITS (ref 7.35–7.45)
PH BLDA: 7.29 PH UNITS (ref 7.35–7.45)
PH, TEMP CORRECTED: ABNORMAL
PH, TEMP CORRECTED: ABNORMAL
PLATELET # BLD AUTO: 223 10*3/MM3 (ref 140–450)
PMV BLD AUTO: 11.6 FL (ref 6–12)
PO2 BLDA: 66.7 MM HG (ref 83–108)
PO2 BLDA: 76.4 MM HG (ref 83–108)
PO2 TEMP ADJ BLD: ABNORMAL MM[HG]
PO2 TEMP ADJ BLD: ABNORMAL MM[HG]
POTASSIUM SERPL-SCNC: 4.1 MMOL/L (ref 3.5–5.2)
PROCALCITONIN SERPL-MCNC: 0.06 NG/ML (ref 0–0.25)
PROT SERPL-MCNC: 7.8 G/DL (ref 6–8.5)
RBC # BLD AUTO: 3.47 10*6/MM3 (ref 3.77–5.28)
RHINOVIRUS RNA SPEC NAA+PROBE: NOT DETECTED
RSV RNA NPH QL NAA+NON-PROBE: NOT DETECTED
SAO2 % BLDCOA: 91.4 % (ref 94–99)
SAO2 % BLDCOA: 93.3 % (ref 94–99)
SARS-COV-2 RNA RESP QL NAA+PROBE: NOT DETECTED
SET MECH RESP RATE: 22
SODIUM SERPL-SCNC: 143 MMOL/L (ref 136–145)
TROPONIN T NUMERIC DELTA: 0 NG/L
TROPONIN T SERPL HS-MCNC: 11 NG/L
VENTILATOR MODE: ABNORMAL
VENTILATOR MODE: ABNORMAL
WBC NRBC COR # BLD AUTO: 9.85 10*3/MM3 (ref 3.4–10.8)
WHOLE BLOOD HOLD COAG: NORMAL
WHOLE BLOOD HOLD SPECIMEN: NORMAL

## 2025-08-01 PROCEDURE — 99223 1ST HOSP IP/OBS HIGH 75: CPT | Performed by: HOSPITALIST

## 2025-08-01 PROCEDURE — 94640 AIRWAY INHALATION TREATMENT: CPT

## 2025-08-01 PROCEDURE — 94799 UNLISTED PULMONARY SVC/PX: CPT

## 2025-08-01 PROCEDURE — 25010000002 HYDROMORPHONE PER 4 MG: Performed by: NURSE PRACTITIONER

## 2025-08-01 PROCEDURE — 83605 ASSAY OF LACTIC ACID: CPT

## 2025-08-01 PROCEDURE — 25810000003 SODIUM CHLORIDE 0.9 % SOLUTION 250 ML FLEX CONT: Performed by: HOSPITALIST

## 2025-08-01 PROCEDURE — 85379 FIBRIN DEGRADATION QUANT: CPT | Performed by: HOSPITALIST

## 2025-08-01 PROCEDURE — 71250 CT THORAX DX C-: CPT

## 2025-08-01 PROCEDURE — 82805 BLOOD GASES W/O2 SATURATION: CPT

## 2025-08-01 PROCEDURE — 83036 HEMOGLOBIN GLYCOSYLATED A1C: CPT | Performed by: HOSPITALIST

## 2025-08-01 PROCEDURE — 99285 EMERGENCY DEPT VISIT HI MDM: CPT

## 2025-08-01 PROCEDURE — 84145 PROCALCITONIN (PCT): CPT | Performed by: NURSE PRACTITIONER

## 2025-08-01 PROCEDURE — 84484 ASSAY OF TROPONIN QUANT: CPT

## 2025-08-01 PROCEDURE — 94660 CPAP INITIATION&MGMT: CPT

## 2025-08-01 PROCEDURE — 25010000002 ONDANSETRON PER 1 MG: Performed by: NURSE PRACTITIONER

## 2025-08-01 PROCEDURE — 85025 COMPLETE CBC W/AUTO DIFF WBC: CPT

## 2025-08-01 PROCEDURE — 25010000002 AZITHROMYCIN PER 500 MG: Performed by: HOSPITALIST

## 2025-08-01 PROCEDURE — 25010000002 LEVOFLOXACIN PER 250 MG: Performed by: NURSE PRACTITIONER

## 2025-08-01 PROCEDURE — 94761 N-INVAS EAR/PLS OXIMETRY MLT: CPT

## 2025-08-01 PROCEDURE — 25010000002 METHYLPREDNISOLONE PER 125 MG: Performed by: NURSE PRACTITIONER

## 2025-08-01 PROCEDURE — 25810000003 SODIUM CHLORIDE 0.9 % SOLUTION: Performed by: HOSPITALIST

## 2025-08-01 PROCEDURE — 71045 X-RAY EXAM CHEST 1 VIEW: CPT

## 2025-08-01 PROCEDURE — 0202U NFCT DS 22 TRGT SARS-COV-2: CPT | Performed by: HOSPITALIST

## 2025-08-01 PROCEDURE — 80053 COMPREHEN METABOLIC PANEL: CPT

## 2025-08-01 PROCEDURE — 36600 WITHDRAWAL OF ARTERIAL BLOOD: CPT

## 2025-08-01 PROCEDURE — 83880 ASSAY OF NATRIURETIC PEPTIDE: CPT | Performed by: NURSE PRACTITIONER

## 2025-08-01 PROCEDURE — 83050 HGB METHEMOGLOBIN QUAN: CPT

## 2025-08-01 PROCEDURE — 63710000001 INSULIN LISPRO (HUMAN) PER 5 UNITS: Performed by: HOSPITALIST

## 2025-08-01 PROCEDURE — 36415 COLL VENOUS BLD VENIPUNCTURE: CPT

## 2025-08-01 PROCEDURE — 82375 ASSAY CARBOXYHB QUANT: CPT

## 2025-08-01 PROCEDURE — 82948 REAGENT STRIP/BLOOD GLUCOSE: CPT

## 2025-08-01 PROCEDURE — 93005 ELECTROCARDIOGRAM TRACING: CPT

## 2025-08-01 PROCEDURE — 87040 BLOOD CULTURE FOR BACTERIA: CPT

## 2025-08-01 PROCEDURE — 86140 C-REACTIVE PROTEIN: CPT | Performed by: NURSE PRACTITIONER

## 2025-08-01 PROCEDURE — 25010000002 HEPARIN (PORCINE) PER 1000 UNITS: Performed by: HOSPITALIST

## 2025-08-01 RX ORDER — SODIUM CHLORIDE 0.9 % (FLUSH) 0.9 %
10 SYRINGE (ML) INJECTION EVERY 12 HOURS SCHEDULED
Status: DISCONTINUED | OUTPATIENT
Start: 2025-08-01 | End: 2025-08-04 | Stop reason: HOSPADM

## 2025-08-01 RX ORDER — NITROGLYCERIN 0.4 MG/1
0.4 TABLET SUBLINGUAL
Status: DISCONTINUED | OUTPATIENT
Start: 2025-08-01 | End: 2025-08-04 | Stop reason: HOSPADM

## 2025-08-01 RX ORDER — PANTOPRAZOLE SODIUM 40 MG/10ML
80 INJECTION, POWDER, LYOPHILIZED, FOR SOLUTION INTRAVENOUS ONCE
Status: COMPLETED | OUTPATIENT
Start: 2025-08-01 | End: 2025-08-01

## 2025-08-01 RX ORDER — NICOTINE POLACRILEX 4 MG
15 LOZENGE BUCCAL
Status: DISCONTINUED | OUTPATIENT
Start: 2025-08-01 | End: 2025-08-04

## 2025-08-01 RX ORDER — HYDROMORPHONE HYDROCHLORIDE 1 MG/ML
0.25 INJECTION, SOLUTION INTRAMUSCULAR; INTRAVENOUS; SUBCUTANEOUS ONCE
Status: COMPLETED | OUTPATIENT
Start: 2025-08-01 | End: 2025-08-01

## 2025-08-01 RX ORDER — SODIUM CHLORIDE 0.9 % (FLUSH) 0.9 %
10 SYRINGE (ML) INJECTION AS NEEDED
Status: DISCONTINUED | OUTPATIENT
Start: 2025-08-01 | End: 2025-08-04 | Stop reason: HOSPADM

## 2025-08-01 RX ORDER — DEXTROSE MONOHYDRATE 25 G/50ML
25 INJECTION, SOLUTION INTRAVENOUS
Status: DISCONTINUED | OUTPATIENT
Start: 2025-08-01 | End: 2025-08-04

## 2025-08-01 RX ORDER — AMOXICILLIN 250 MG
2 CAPSULE ORAL 2 TIMES DAILY PRN
Status: DISCONTINUED | OUTPATIENT
Start: 2025-08-01 | End: 2025-08-04 | Stop reason: HOSPADM

## 2025-08-01 RX ORDER — HYDROMORPHONE HYDROCHLORIDE 1 MG/ML
0.5 INJECTION, SOLUTION INTRAMUSCULAR; INTRAVENOUS; SUBCUTANEOUS ONCE
Refills: 0 | Status: DISCONTINUED | OUTPATIENT
Start: 2025-08-01 | End: 2025-08-01

## 2025-08-01 RX ORDER — LEVOFLOXACIN 5 MG/ML
750 INJECTION, SOLUTION INTRAVENOUS ONCE
Status: COMPLETED | OUTPATIENT
Start: 2025-08-01 | End: 2025-08-01

## 2025-08-01 RX ORDER — BISACODYL 10 MG
10 SUPPOSITORY, RECTAL RECTAL DAILY PRN
Status: DISCONTINUED | OUTPATIENT
Start: 2025-08-01 | End: 2025-08-04 | Stop reason: HOSPADM

## 2025-08-01 RX ORDER — BISACODYL 5 MG/1
5 TABLET, DELAYED RELEASE ORAL DAILY PRN
Status: DISCONTINUED | OUTPATIENT
Start: 2025-08-01 | End: 2025-08-04 | Stop reason: HOSPADM

## 2025-08-01 RX ORDER — IPRATROPIUM BROMIDE AND ALBUTEROL SULFATE 2.5; .5 MG/3ML; MG/3ML
3 SOLUTION RESPIRATORY (INHALATION) ONCE
Status: COMPLETED | OUTPATIENT
Start: 2025-08-01 | End: 2025-08-01

## 2025-08-01 RX ORDER — ONDANSETRON 2 MG/ML
4 INJECTION INTRAMUSCULAR; INTRAVENOUS ONCE
Status: COMPLETED | OUTPATIENT
Start: 2025-08-01 | End: 2025-08-01

## 2025-08-01 RX ORDER — SODIUM CHLORIDE 9 MG/ML
40 INJECTION, SOLUTION INTRAVENOUS AS NEEDED
Status: DISCONTINUED | OUTPATIENT
Start: 2025-08-01 | End: 2025-08-04 | Stop reason: HOSPADM

## 2025-08-01 RX ORDER — SODIUM CHLORIDE 9 MG/ML
75 INJECTION, SOLUTION INTRAVENOUS CONTINUOUS
Status: ACTIVE | OUTPATIENT
Start: 2025-08-01 | End: 2025-08-02

## 2025-08-01 RX ORDER — GLUCAGON 1 MG/ML
1 KIT INJECTION
Status: DISCONTINUED | OUTPATIENT
Start: 2025-08-01 | End: 2025-08-04

## 2025-08-01 RX ORDER — IPRATROPIUM BROMIDE AND ALBUTEROL SULFATE 2.5; .5 MG/3ML; MG/3ML
3 SOLUTION RESPIRATORY (INHALATION)
Status: DISCONTINUED | OUTPATIENT
Start: 2025-08-02 | End: 2025-08-04 | Stop reason: HOSPADM

## 2025-08-01 RX ORDER — HEPARIN SODIUM 5000 [USP'U]/ML
5000 INJECTION, SOLUTION INTRAVENOUS; SUBCUTANEOUS EVERY 12 HOURS SCHEDULED
Status: DISCONTINUED | OUTPATIENT
Start: 2025-08-01 | End: 2025-08-04

## 2025-08-01 RX ORDER — INSULIN LISPRO 100 [IU]/ML
2-7 INJECTION, SOLUTION INTRAVENOUS; SUBCUTANEOUS
Status: DISCONTINUED | OUTPATIENT
Start: 2025-08-01 | End: 2025-08-04

## 2025-08-01 RX ORDER — POLYETHYLENE GLYCOL 3350 17 G/17G
17 POWDER, FOR SOLUTION ORAL DAILY PRN
Status: DISCONTINUED | OUTPATIENT
Start: 2025-08-01 | End: 2025-08-04 | Stop reason: HOSPADM

## 2025-08-01 RX ADMIN — METHYLPREDNISOLONE SODIUM SUCCINATE 125 MG: 125 INJECTION INTRAMUSCULAR; INTRAVENOUS at 17:49

## 2025-08-01 RX ADMIN — IPRATROPIUM BROMIDE AND ALBUTEROL SULFATE 3 ML: .5; 3 SOLUTION RESPIRATORY (INHALATION) at 18:53

## 2025-08-01 RX ADMIN — ONDANSETRON 4 MG: 2 INJECTION, SOLUTION INTRAMUSCULAR; INTRAVENOUS at 18:13

## 2025-08-01 RX ADMIN — IPRATROPIUM BROMIDE AND ALBUTEROL SULFATE 3 ML: .5; 3 SOLUTION RESPIRATORY (INHALATION) at 17:22

## 2025-08-01 RX ADMIN — INSULIN LISPRO 3 UNITS: 100 INJECTION, SOLUTION INTRAVENOUS; SUBCUTANEOUS at 20:39

## 2025-08-01 RX ADMIN — SODIUM CHLORIDE 75 ML/HR: 9 INJECTION, SOLUTION INTRAVENOUS at 21:27

## 2025-08-01 RX ADMIN — Medication 10 ML: at 21:27

## 2025-08-01 RX ADMIN — HYDROMORPHONE HYDROCHLORIDE 0.25 MG: 1 INJECTION, SOLUTION INTRAMUSCULAR; INTRAVENOUS; SUBCUTANEOUS at 18:12

## 2025-08-01 RX ADMIN — SODIUM CHLORIDE 500 MG: 9 INJECTION, SOLUTION INTRAVENOUS at 20:37

## 2025-08-01 RX ADMIN — IPRATROPIUM BROMIDE 0.5 MG: 0.5 SOLUTION RESPIRATORY (INHALATION) at 20:26

## 2025-08-01 RX ADMIN — LEVOFLOXACIN 750 MG: 5 INJECTION, SOLUTION INTRAVENOUS at 17:49

## 2025-08-01 RX ADMIN — HEPARIN SODIUM 5000 UNITS: 5000 INJECTION, SOLUTION INTRAVENOUS; SUBCUTANEOUS at 20:51

## 2025-08-01 RX ADMIN — DEXMEDETOMIDINE 0.2 MCG/KG/HR: 100 INJECTION, SOLUTION INTRAVENOUS at 22:59

## 2025-08-01 RX ADMIN — PANTOPRAZOLE SODIUM 80 MG: 40 INJECTION, POWDER, FOR SOLUTION INTRAVENOUS at 19:54

## 2025-08-01 RX ADMIN — SODIUM CHLORIDE 500 ML: 9 INJECTION, SOLUTION INTRAVENOUS at 21:26

## 2025-08-02 ENCOUNTER — APPOINTMENT (OUTPATIENT)
Dept: CARDIOLOGY | Facility: HOSPITAL | Age: 60
DRG: 871 | End: 2025-08-02
Payer: COMMERCIAL

## 2025-08-02 ENCOUNTER — APPOINTMENT (OUTPATIENT)
Dept: NUCLEAR MEDICINE | Facility: HOSPITAL | Age: 60
DRG: 871 | End: 2025-08-02
Payer: COMMERCIAL

## 2025-08-02 LAB
A-A DO2: 102.7 MMHG (ref 0–300)
A-A DO2: 110 MMHG (ref 0–300)
A-A DO2: 55.9 MMHG (ref 0–300)
A-A DO2: 85.3 MMHG (ref 0–300)
A-A DO2: 98.5 MMHG (ref 0–300)
ALBUMIN SERPL-MCNC: 3.1 G/DL (ref 3.5–5.2)
ALBUMIN/GLOB SERPL: 0.8 G/DL
ALP SERPL-CCNC: 75 U/L (ref 39–117)
ALT SERPL W P-5'-P-CCNC: 7 U/L (ref 1–33)
ANION GAP SERPL CALCULATED.3IONS-SCNC: 15.7 MMOL/L (ref 5–15)
AORTIC DIMENSIONLESS INDEX: 0.82 (DI)
ARTERIAL PATENCY WRIST A: ABNORMAL
ARTERIAL PATENCY WRIST A: POSITIVE
ARTERIAL PATENCY WRIST A: POSITIVE
AST SERPL-CCNC: 14 U/L (ref 1–32)
ATMOSPHERIC PRESS: 730 MMHG
ATMOSPHERIC PRESS: 730 MMHG
ATMOSPHERIC PRESS: 731 MMHG
ATMOSPHERIC PRESS: 731 MMHG
ATMOSPHERIC PRESS: 732 MMHG
AV MEAN PRESS GRAD SYS DOP V1V2: 3 MMHG
AV VMAX SYS DOP: 127 CM/SEC
BASE EXCESS BLDA CALC-SCNC: 10 MMOL/L (ref 0–2)
BASE EXCESS BLDA CALC-SCNC: 10.8 MMOL/L (ref 0–2)
BASE EXCESS BLDA CALC-SCNC: 11.2 MMOL/L (ref 0–2)
BASE EXCESS BLDA CALC-SCNC: 11.3 MMOL/L (ref 0–2)
BASE EXCESS BLDA CALC-SCNC: 11.6 MMOL/L (ref 0–2)
BASOPHILS # BLD AUTO: 0.02 10*3/MM3 (ref 0–0.2)
BASOPHILS NFR BLD AUTO: 0.2 % (ref 0–1.5)
BDY SITE: ABNORMAL
BH CV ECHO MEAS - ACS: 1.7 CM
BH CV ECHO MEAS - AO MAX PG: 6.5 MMHG
BH CV ECHO MEAS - AO ROOT DIAM: 2.7 CM
BH CV ECHO MEAS - AO V2 VTI: 28.6 CM
BH CV ECHO MEAS - AVA(I,D): 2.6 CM2
BH CV ECHO MEAS - EDV(CUBED): 140.6 ML
BH CV ECHO MEAS - EDV(MOD-SP2): 113 ML
BH CV ECHO MEAS - EDV(MOD-SP4): 93.3 ML
BH CV ECHO MEAS - EF(MOD-SP2): 45.8 %
BH CV ECHO MEAS - EF(MOD-SP4): 45.7 %
BH CV ECHO MEAS - ESV(CUBED): 54.9 ML
BH CV ECHO MEAS - ESV(MOD-SP2): 61.3 ML
BH CV ECHO MEAS - ESV(MOD-SP4): 50.7 ML
BH CV ECHO MEAS - FS: 26.9 %
BH CV ECHO MEAS - IVS/LVPW: 0.89 CM
BH CV ECHO MEAS - IVSD: 0.8 CM
BH CV ECHO MEAS - LA DIMENSION: 2.6 CM
BH CV ECHO MEAS - LAT PEAK E' VEL: 9.7 CM/SEC
BH CV ECHO MEAS - LV DIASTOLIC VOL/BSA (35-75): 51.1 CM2
BH CV ECHO MEAS - LV MASS(C)D: 156.9 GRAMS
BH CV ECHO MEAS - LV MAX PG: 3.8 MMHG
BH CV ECHO MEAS - LV MEAN PG: 2 MMHG
BH CV ECHO MEAS - LV SYSTOLIC VOL/BSA (12-30): 27.8 CM2
BH CV ECHO MEAS - LV V1 MAX: 97.3 CM/SEC
BH CV ECHO MEAS - LV V1 VTI: 23.5 CM
BH CV ECHO MEAS - LVIDD: 5.2 CM
BH CV ECHO MEAS - LVIDS: 3.8 CM
BH CV ECHO MEAS - LVOT AREA: 3.1 CM2
BH CV ECHO MEAS - LVOT DIAM: 2 CM
BH CV ECHO MEAS - LVPWD: 0.9 CM
BH CV ECHO MEAS - MED PEAK E' VEL: 7.9 CM/SEC
BH CV ECHO MEAS - MR MAX PG: 48.7 MMHG
BH CV ECHO MEAS - MR MAX VEL: 343 CM/SEC
BH CV ECHO MEAS - MV A MAX VEL: 96.8 CM/SEC
BH CV ECHO MEAS - MV DEC SLOPE: 692 CM/SEC2
BH CV ECHO MEAS - MV DEC TIME: 0.15 SEC
BH CV ECHO MEAS - MV E MAX VEL: 103 CM/SEC
BH CV ECHO MEAS - MV E/A: 1.06
BH CV ECHO MEAS - MV MAX PG: 5.4 MMHG
BH CV ECHO MEAS - MV MEAN PG: 4 MMHG
BH CV ECHO MEAS - MV V2 VTI: 26.4 CM
BH CV ECHO MEAS - MVA(VTI): 2.8 CM2
BH CV ECHO MEAS - PA ACC TIME: 0.14 SEC
BH CV ECHO MEAS - PA V2 MAX: 112 CM/SEC
BH CV ECHO MEAS - RAP SYSTOLE: 10 MMHG
BH CV ECHO MEAS - RVSP: 18.1 MMHG
BH CV ECHO MEAS - SV(LVOT): 73.8 ML
BH CV ECHO MEAS - SV(MOD-SP2): 51.7 ML
BH CV ECHO MEAS - SV(MOD-SP4): 42.6 ML
BH CV ECHO MEAS - SVI(LVOT): 40.5 ML/M2
BH CV ECHO MEAS - SVI(MOD-SP2): 28.3 ML/M2
BH CV ECHO MEAS - SVI(MOD-SP4): 23.4 ML/M2
BH CV ECHO MEAS - TAPSE (>1.6): 2.19 CM
BH CV ECHO MEAS - TR MAX PG: 8.1 MMHG
BH CV ECHO MEAS - TR MAX VEL: 142 CM/SEC
BH CV ECHO MEASUREMENTS AVERAGE E/E' RATIO: 11.7
BILIRUB SERPL-MCNC: 0.2 MG/DL (ref 0–1.2)
BUN SERPL-MCNC: 14.7 MG/DL (ref 6–20)
BUN/CREAT SERPL: 30.6 (ref 7–25)
CALCIUM SPEC-SCNC: 8.6 MG/DL (ref 8.6–10.5)
CHLORIDE SERPL-SCNC: 97 MMOL/L (ref 98–107)
CO2 BLDA-SCNC: 37.4 MMOL/L (ref 22–33)
CO2 BLDA-SCNC: 38.9 MMOL/L (ref 22–33)
CO2 BLDA-SCNC: 39.5 MMOL/L (ref 22–33)
CO2 BLDA-SCNC: 40.2 MMOL/L (ref 22–33)
CO2 BLDA-SCNC: 42.7 MMOL/L (ref 22–33)
CO2 SERPL-SCNC: 28.3 MMOL/L (ref 22–29)
COHGB MFR BLD: 1.3 % (ref 0–5)
COHGB MFR BLD: 1.5 % (ref 0–5)
COHGB MFR BLD: 1.5 % (ref 0–5)
COHGB MFR BLD: 1.7 % (ref 0–5)
COHGB MFR BLD: 2.1 % (ref 0–5)
CREAT SERPL-MCNC: 0.48 MG/DL (ref 0.57–1)
DEPRECATED RDW RBC AUTO: 45.8 FL (ref 37–54)
EGFRCR SERPLBLD CKD-EPI 2021: 110.6 ML/MIN/1.73
EOSINOPHIL # BLD AUTO: 0 10*3/MM3 (ref 0–0.4)
EOSINOPHIL NFR BLD AUTO: 0 % (ref 0.3–6.2)
EPAP: 6
EPAP: 8
ERYTHROCYTE [DISTWIDTH] IN BLOOD BY AUTOMATED COUNT: 12 % (ref 12.3–15.4)
GAS FLOW AIRWAY: 3 LPM
GLOBULIN UR ELPH-MCNC: 4 GM/DL
GLUCOSE BLDC GLUCOMTR-MCNC: 106 MG/DL (ref 70–130)
GLUCOSE BLDC GLUCOMTR-MCNC: 120 MG/DL (ref 70–130)
GLUCOSE BLDC GLUCOMTR-MCNC: 133 MG/DL (ref 70–130)
GLUCOSE BLDC GLUCOMTR-MCNC: 137 MG/DL (ref 70–130)
GLUCOSE BLDC GLUCOMTR-MCNC: 147 MG/DL (ref 70–130)
GLUCOSE SERPL-MCNC: 162 MG/DL (ref 65–99)
HCO3 BLDA-SCNC: 36 MMOL/L (ref 20–26)
HCO3 BLDA-SCNC: 37.1 MMOL/L (ref 20–26)
HCO3 BLDA-SCNC: 37.6 MMOL/L (ref 20–26)
HCO3 BLDA-SCNC: 38.3 MMOL/L (ref 20–26)
HCO3 BLDA-SCNC: 39.9 MMOL/L (ref 20–26)
HCT VFR BLD AUTO: 33.9 % (ref 34–46.6)
HCT VFR BLD CALC: 27.4 % (ref 38–51)
HCT VFR BLD CALC: 28.3 % (ref 38–51)
HCT VFR BLD CALC: 28.5 % (ref 38–51)
HCT VFR BLD CALC: 29.5 % (ref 38–51)
HCT VFR BLD CALC: 31.9 % (ref 38–51)
HGB BLD-MCNC: 9.5 G/DL (ref 12–15.9)
HGB BLDA-MCNC: 10.4 G/DL (ref 13.5–17.5)
HGB BLDA-MCNC: 8.9 G/DL (ref 13.5–17.5)
HGB BLDA-MCNC: 9.2 G/DL (ref 13.5–17.5)
HGB BLDA-MCNC: 9.3 G/DL (ref 13.5–17.5)
HGB BLDA-MCNC: 9.6 G/DL (ref 13.5–17.5)
IMM GRANULOCYTES # BLD AUTO: 0.1 10*3/MM3 (ref 0–0.05)
IMM GRANULOCYTES NFR BLD AUTO: 1.1 % (ref 0–0.5)
INHALED O2 CONCENTRATION: 32 %
INHALED O2 CONCENTRATION: 32 %
INHALED O2 CONCENTRATION: 35 %
INHALED O2 CONCENTRATION: 35 %
INHALED O2 CONCENTRATION: 37 %
IPAP: 20
IPAP: 22
L PNEUMO1 AG UR QL IA: NEGATIVE
LEFT ATRIUM VOLUME INDEX: 24.3 ML/M2
LV EF BIPLANE MOD: 46 %
LYMPHOCYTES # BLD AUTO: 0.27 10*3/MM3 (ref 0.7–3.1)
LYMPHOCYTES NFR BLD AUTO: 2.9 % (ref 19.6–45.3)
Lab: ABNORMAL
MCH RBC QN AUTO: 28.9 PG (ref 26.6–33)
MCHC RBC AUTO-ENTMCNC: 28 G/DL (ref 31.5–35.7)
MCV RBC AUTO: 103 FL (ref 79–97)
METHGB BLD QL: 0.4 % (ref 0–3)
METHGB BLD QL: 0.5 % (ref 0–3)
METHGB BLD QL: 0.7 % (ref 0–3)
METHGB BLD QL: 0.8 % (ref 0–3)
METHGB BLD QL: <-0.1 % (ref 0–3)
MODALITY: ABNORMAL
MONOCYTES # BLD AUTO: 0.1 10*3/MM3 (ref 0.1–0.9)
MONOCYTES NFR BLD AUTO: 1.1 % (ref 5–12)
NEUTROPHILS NFR BLD AUTO: 8.75 10*3/MM3 (ref 1.7–7)
NEUTROPHILS NFR BLD AUTO: 94.7 % (ref 42.7–76)
NOTE: ABNORMAL
NOTIFIED BY: ABNORMAL
NOTIFIED WHO: ABNORMAL
NRBC BLD AUTO-RTO: 0 /100 WBC (ref 0–0.2)
OXYHGB MFR BLDV: 86.2 % (ref 94–99)
OXYHGB MFR BLDV: 87.7 % (ref 94–99)
OXYHGB MFR BLDV: 89.2 % (ref 94–99)
OXYHGB MFR BLDV: 94.5 % (ref 94–99)
OXYHGB MFR BLDV: 96.1 % (ref 94–99)
PCO2 BLDA: 47.8 MM HG (ref 35–45)
PCO2 BLDA: 56.9 MM HG (ref 35–45)
PCO2 BLDA: 63.1 MM HG (ref 35–45)
PCO2 BLDA: 63.1 MM HG (ref 35–45)
PCO2 BLDA: 91 MM HG (ref 35–45)
PCO2 TEMP ADJ BLD: ABNORMAL MM[HG]
PH BLDA: 7.25 PH UNITS (ref 7.35–7.45)
PH BLDA: 7.38 PH UNITS (ref 7.35–7.45)
PH BLDA: 7.39 PH UNITS (ref 7.35–7.45)
PH BLDA: 7.42 PH UNITS (ref 7.35–7.45)
PH BLDA: 7.49 PH UNITS (ref 7.35–7.45)
PH, TEMP CORRECTED: ABNORMAL
PLATELET # BLD AUTO: 190 10*3/MM3 (ref 140–450)
PMV BLD AUTO: 11.5 FL (ref 6–12)
PO2 BLDA: 102 MM HG (ref 83–108)
PO2 BLDA: 52.9 MM HG (ref 83–108)
PO2 BLDA: 59.6 MM HG (ref 83–108)
PO2 BLDA: 61.7 MM HG (ref 83–108)
PO2 BLDA: 84 MM HG (ref 83–108)
PO2 TEMP ADJ BLD: ABNORMAL MM[HG]
POTASSIUM SERPL-SCNC: 3.7 MMOL/L (ref 3.5–5.2)
PROT SERPL-MCNC: 7.1 G/DL (ref 6–8.5)
RBC # BLD AUTO: 3.29 10*6/MM3 (ref 3.77–5.28)
S PNEUM AG SPEC QL LA: NEGATIVE
SAO2 % BLDCOA: 88 % (ref 94–99)
SAO2 % BLDCOA: 89.5 % (ref 94–99)
SAO2 % BLDCOA: 91.2 % (ref 94–99)
SAO2 % BLDCOA: 95.8 % (ref 94–99)
SAO2 % BLDCOA: 98.2 % (ref 94–99)
SET MECH RESP RATE: 24
SODIUM SERPL-SCNC: 141 MMOL/L (ref 136–145)
VENTILATOR MODE: ABNORMAL
WBC NRBC COR # BLD AUTO: 9.24 10*3/MM3 (ref 3.4–10.8)

## 2025-08-02 PROCEDURE — 78582 LUNG VENTILAT&PERFUS IMAGING: CPT

## 2025-08-02 PROCEDURE — 34310000005 TECHNETIUM TC 99M PENTETATE INHALER: Performed by: INTERNAL MEDICINE

## 2025-08-02 PROCEDURE — 36600 WITHDRAWAL OF ARTERIAL BLOOD: CPT

## 2025-08-02 PROCEDURE — 82375 ASSAY CARBOXYHB QUANT: CPT

## 2025-08-02 PROCEDURE — 93306 TTE W/DOPPLER COMPLETE: CPT | Performed by: INTERNAL MEDICINE

## 2025-08-02 PROCEDURE — 94761 N-INVAS EAR/PLS OXIMETRY MLT: CPT

## 2025-08-02 PROCEDURE — 25010000002 ACETAZOLAMIDE PER 500 MG: Performed by: INTERNAL MEDICINE

## 2025-08-02 PROCEDURE — 78582 LUNG VENTILAT&PERFUS IMAGING: CPT | Performed by: RADIOLOGY

## 2025-08-02 PROCEDURE — 80053 COMPREHEN METABOLIC PANEL: CPT | Performed by: HOSPITALIST

## 2025-08-02 PROCEDURE — 94660 CPAP INITIATION&MGMT: CPT

## 2025-08-02 PROCEDURE — 85025 COMPLETE CBC W/AUTO DIFF WBC: CPT | Performed by: HOSPITALIST

## 2025-08-02 PROCEDURE — 93306 TTE W/DOPPLER COMPLETE: CPT

## 2025-08-02 PROCEDURE — 25010000002 AZITHROMYCIN PER 500 MG: Performed by: HOSPITALIST

## 2025-08-02 PROCEDURE — A9540 TC99M MAA: HCPCS | Performed by: INTERNAL MEDICINE

## 2025-08-02 PROCEDURE — 25010000002 METHYLPREDNISOLONE PER 40 MG: Performed by: HOSPITALIST

## 2025-08-02 PROCEDURE — 83735 ASSAY OF MAGNESIUM: CPT | Performed by: INTERNAL MEDICINE

## 2025-08-02 PROCEDURE — 83050 HGB METHEMOGLOBIN QUAN: CPT

## 2025-08-02 PROCEDURE — 94664 DEMO&/EVAL PT USE INHALER: CPT

## 2025-08-02 PROCEDURE — 82805 BLOOD GASES W/O2 SATURATION: CPT

## 2025-08-02 PROCEDURE — 82948 REAGENT STRIP/BLOOD GLUCOSE: CPT | Performed by: HOSPITALIST

## 2025-08-02 PROCEDURE — 25010000002 HEPARIN (PORCINE) PER 1000 UNITS: Performed by: HOSPITALIST

## 2025-08-02 PROCEDURE — 94799 UNLISTED PULMONARY SVC/PX: CPT

## 2025-08-02 PROCEDURE — 99233 SBSQ HOSP IP/OBS HIGH 50: CPT | Performed by: INTERNAL MEDICINE

## 2025-08-02 PROCEDURE — 34310000005 TECHNETIUM ALBUMIN AGGREGATED: Performed by: INTERNAL MEDICINE

## 2025-08-02 PROCEDURE — 36415 COLL VENOUS BLD VENIPUNCTURE: CPT | Performed by: HOSPITALIST

## 2025-08-02 PROCEDURE — 87899 AGENT NOS ASSAY W/OPTIC: CPT | Performed by: INTERNAL MEDICINE

## 2025-08-02 PROCEDURE — 85027 COMPLETE CBC AUTOMATED: CPT | Performed by: INTERNAL MEDICINE

## 2025-08-02 PROCEDURE — 25010000002 AZTREONAM PER 500 MG: Performed by: INTERNAL MEDICINE

## 2025-08-02 PROCEDURE — 25810000003 SODIUM CHLORIDE 0.9 % SOLUTION 250 ML FLEX CONT: Performed by: HOSPITALIST

## 2025-08-02 PROCEDURE — 99222 1ST HOSP IP/OBS MODERATE 55: CPT | Performed by: INTERNAL MEDICINE

## 2025-08-02 PROCEDURE — 82948 REAGENT STRIP/BLOOD GLUCOSE: CPT

## 2025-08-02 PROCEDURE — 87449 NOS EACH ORGANISM AG IA: CPT | Performed by: INTERNAL MEDICINE

## 2025-08-02 PROCEDURE — A9567 TECHNETIUM TC-99M AEROSOL: HCPCS | Performed by: INTERNAL MEDICINE

## 2025-08-02 RX ORDER — SODIUM CHLORIDE 0.9 % (FLUSH) 0.9 %
10 SYRINGE (ML) INJECTION AS NEEDED
Status: DISCONTINUED | OUTPATIENT
Start: 2025-08-02 | End: 2025-08-04 | Stop reason: HOSPADM

## 2025-08-02 RX ORDER — SODIUM CHLORIDE 9 MG/ML
40 INJECTION, SOLUTION INTRAVENOUS AS NEEDED
Status: DISCONTINUED | OUTPATIENT
Start: 2025-08-02 | End: 2025-08-04 | Stop reason: HOSPADM

## 2025-08-02 RX ORDER — SODIUM CHLORIDE 0.9 % (FLUSH) 0.9 %
10 SYRINGE (ML) INJECTION EVERY 12 HOURS SCHEDULED
Status: DISCONTINUED | OUTPATIENT
Start: 2025-08-02 | End: 2025-08-04 | Stop reason: HOSPADM

## 2025-08-02 RX ADMIN — AZTREONAM 2000 MG: 2 INJECTION, POWDER, LYOPHILIZED, FOR SOLUTION INTRAMUSCULAR; INTRAVENOUS at 17:46

## 2025-08-02 RX ADMIN — SODIUM CHLORIDE 500 MG: 9 INJECTION, SOLUTION INTRAVENOUS at 20:49

## 2025-08-02 RX ADMIN — IPRATROPIUM BROMIDE AND ALBUTEROL SULFATE 3 ML: .5; 3 SOLUTION RESPIRATORY (INHALATION) at 07:39

## 2025-08-02 RX ADMIN — AZTREONAM 2000 MG: 2 INJECTION, POWDER, LYOPHILIZED, FOR SOLUTION INTRAMUSCULAR; INTRAVENOUS at 09:50

## 2025-08-02 RX ADMIN — Medication 10 ML: at 09:46

## 2025-08-02 RX ADMIN — IPRATROPIUM BROMIDE AND ALBUTEROL SULFATE 3 ML: .5; 3 SOLUTION RESPIRATORY (INHALATION) at 19:08

## 2025-08-02 RX ADMIN — KIT FOR THE PREPARATION OF TECHNETIUM TC 99M ALBUMIN AGGREGATED 1 DOSE: 2.5 INJECTION, POWDER, FOR SOLUTION INTRAVENOUS at 11:35

## 2025-08-02 RX ADMIN — Medication 10 ML: at 09:45

## 2025-08-02 RX ADMIN — Medication 10 ML: at 20:53

## 2025-08-02 RX ADMIN — KIT FOR THE PREPARATION OF TECHNETIUM TC 99M PENTETATE 1 DOSE: 20 INJECTION, POWDER, LYOPHILIZED, FOR SOLUTION INTRAVENOUS; RESPIRATORY (INHALATION) at 11:18

## 2025-08-02 RX ADMIN — METHYLPREDNISOLONE SODIUM SUCCINATE 40 MG: 40 INJECTION INTRAMUSCULAR; INTRAVENOUS at 06:04

## 2025-08-02 RX ADMIN — METHYLPREDNISOLONE SODIUM SUCCINATE 40 MG: 40 INJECTION INTRAMUSCULAR; INTRAVENOUS at 17:46

## 2025-08-02 RX ADMIN — HEPARIN SODIUM 5000 UNITS: 5000 INJECTION, SOLUTION INTRAVENOUS; SUBCUTANEOUS at 20:53

## 2025-08-02 RX ADMIN — HEPARIN SODIUM 5000 UNITS: 5000 INJECTION, SOLUTION INTRAVENOUS; SUBCUTANEOUS at 09:50

## 2025-08-02 RX ADMIN — IPRATROPIUM BROMIDE AND ALBUTEROL SULFATE 3 ML: .5; 3 SOLUTION RESPIRATORY (INHALATION) at 13:39

## 2025-08-02 RX ADMIN — ACETAZOLAMIDE SODIUM 250 MG: 500 INJECTION, POWDER, LYOPHILIZED, FOR SOLUTION INTRAVENOUS at 23:58

## 2025-08-02 RX ADMIN — ACETAZOLAMIDE SODIUM 250 MG: 500 INJECTION, POWDER, LYOPHILIZED, FOR SOLUTION INTRAVENOUS at 13:11

## 2025-08-02 RX ADMIN — Medication 10 ML: at 01:49

## 2025-08-02 RX ADMIN — IPRATROPIUM BROMIDE AND ALBUTEROL SULFATE 3 ML: .5; 3 SOLUTION RESPIRATORY (INHALATION) at 00:16

## 2025-08-03 LAB
A-A DO2: 96.7 MMHG (ref 0–300)
ANION GAP SERPL CALCULATED.3IONS-SCNC: 9.2 MMOL/L (ref 5–15)
ARTERIAL PATENCY WRIST A: ABNORMAL
ATMOSPHERIC PRESS: 729 MMHG
BASE EXCESS BLDA CALC-SCNC: 7.1 MMOL/L (ref 0–2)
BDY SITE: ABNORMAL
BUN SERPL-MCNC: 11.3 MG/DL (ref 6–20)
BUN/CREAT SERPL: 27.6 (ref 7–25)
CALCIUM SPEC-SCNC: 9.1 MG/DL (ref 8.6–10.5)
CHLORIDE SERPL-SCNC: 100 MMOL/L (ref 98–107)
CO2 BLDA-SCNC: 34.1 MMOL/L (ref 22–33)
CO2 SERPL-SCNC: 31.8 MMOL/L (ref 22–29)
COHGB MFR BLD: 1.3 % (ref 0–5)
CREAT SERPL-MCNC: 0.41 MG/DL (ref 0.57–1)
DEPRECATED RDW RBC AUTO: 44.3 FL (ref 37–54)
EGFRCR SERPLBLD CKD-EPI 2021: 114.9 ML/MIN/1.73
EPAP: 8
ERYTHROCYTE [DISTWIDTH] IN BLOOD BY AUTOMATED COUNT: 12.4 % (ref 12.3–15.4)
GLUCOSE BLDC GLUCOMTR-MCNC: 102 MG/DL (ref 70–130)
GLUCOSE BLDC GLUCOMTR-MCNC: 128 MG/DL (ref 70–130)
GLUCOSE BLDC GLUCOMTR-MCNC: 156 MG/DL (ref 70–130)
GLUCOSE BLDC GLUCOMTR-MCNC: 94 MG/DL (ref 70–130)
GLUCOSE SERPL-MCNC: 133 MG/DL (ref 65–99)
HCO3 BLDA-SCNC: 32.5 MMOL/L (ref 20–26)
HCT VFR BLD AUTO: 31.6 % (ref 34–46.6)
HCT VFR BLD CALC: 29.3 % (ref 38–51)
HGB BLD-MCNC: 9.6 G/DL (ref 12–15.9)
HGB BLDA-MCNC: 9.6 G/DL (ref 13.5–17.5)
INHALED O2 CONCENTRATION: 35 %
IPAP: 20
Lab: ABNORMAL
MAGNESIUM SERPL-MCNC: 2.1 MG/DL (ref 1.6–2.6)
MCH RBC QN AUTO: 29.5 PG (ref 26.6–33)
MCHC RBC AUTO-ENTMCNC: 30.4 G/DL (ref 31.5–35.7)
MCV RBC AUTO: 97.2 FL (ref 79–97)
METHGB BLD QL: 0.7 % (ref 0–3)
MODALITY: ABNORMAL
OXYHGB MFR BLDV: 94.9 % (ref 94–99)
PCO2 BLDA: 49.9 MM HG (ref 35–45)
PCO2 TEMP ADJ BLD: ABNORMAL MM[HG]
PH BLDA: 7.42 PH UNITS (ref 7.35–7.45)
PH, TEMP CORRECTED: ABNORMAL
PLATELET # BLD AUTO: 251 10*3/MM3 (ref 140–450)
PMV BLD AUTO: 11.7 FL (ref 6–12)
PO2 BLDA: 86.6 MM HG (ref 83–108)
PO2 TEMP ADJ BLD: ABNORMAL MM[HG]
POTASSIUM SERPL-SCNC: 4.1 MMOL/L (ref 3.5–5.2)
QT INTERVAL: 208 MS
QT INTERVAL: 310 MS
QT INTERVAL: 342 MS
QTC INTERVAL: 304 MS
QTC INTERVAL: 421 MS
QTC INTERVAL: 456 MS
RBC # BLD AUTO: 3.25 10*6/MM3 (ref 3.77–5.28)
SAO2 % BLDCOA: 96.8 % (ref 94–99)
SET MECH RESP RATE: 24
SODIUM SERPL-SCNC: 141 MMOL/L (ref 136–145)
VENTILATOR MODE: ABNORMAL
WBC NRBC COR # BLD AUTO: 16.71 10*3/MM3 (ref 3.4–10.8)

## 2025-08-03 PROCEDURE — 94664 DEMO&/EVAL PT USE INHALER: CPT

## 2025-08-03 PROCEDURE — 25010000002 METHYLPREDNISOLONE PER 40 MG: Performed by: HOSPITALIST

## 2025-08-03 PROCEDURE — 82375 ASSAY CARBOXYHB QUANT: CPT

## 2025-08-03 PROCEDURE — 25010000002 AZTREONAM PER 500 MG: Performed by: INTERNAL MEDICINE

## 2025-08-03 PROCEDURE — 94799 UNLISTED PULMONARY SVC/PX: CPT

## 2025-08-03 PROCEDURE — 25810000003 SODIUM CHLORIDE 0.9 % SOLUTION 250 ML FLEX CONT: Performed by: HOSPITALIST

## 2025-08-03 PROCEDURE — 82805 BLOOD GASES W/O2 SATURATION: CPT

## 2025-08-03 PROCEDURE — 25010000002 AZITHROMYCIN PER 500 MG: Performed by: HOSPITALIST

## 2025-08-03 PROCEDURE — 94660 CPAP INITIATION&MGMT: CPT

## 2025-08-03 PROCEDURE — 25010000002 HEPARIN (PORCINE) PER 1000 UNITS: Performed by: HOSPITALIST

## 2025-08-03 PROCEDURE — 94761 N-INVAS EAR/PLS OXIMETRY MLT: CPT

## 2025-08-03 PROCEDURE — 93005 ELECTROCARDIOGRAM TRACING: CPT | Performed by: INTERNAL MEDICINE

## 2025-08-03 PROCEDURE — 25010000002 ACETAZOLAMIDE PER 500 MG: Performed by: INTERNAL MEDICINE

## 2025-08-03 PROCEDURE — 83050 HGB METHEMOGLOBIN QUAN: CPT

## 2025-08-03 PROCEDURE — 63710000001 INSULIN LISPRO (HUMAN) PER 5 UNITS: Performed by: HOSPITALIST

## 2025-08-03 PROCEDURE — 99233 SBSQ HOSP IP/OBS HIGH 50: CPT | Performed by: INTERNAL MEDICINE

## 2025-08-03 PROCEDURE — 36600 WITHDRAWAL OF ARTERIAL BLOOD: CPT

## 2025-08-03 PROCEDURE — 82948 REAGENT STRIP/BLOOD GLUCOSE: CPT

## 2025-08-03 PROCEDURE — 99232 SBSQ HOSP IP/OBS MODERATE 35: CPT | Performed by: INTERNAL MEDICINE

## 2025-08-03 RX ORDER — METOPROLOL TARTRATE 25 MG/1
12.5 TABLET, FILM COATED ORAL EVERY 12 HOURS SCHEDULED
Status: DISCONTINUED | OUTPATIENT
Start: 2025-08-03 | End: 2025-08-04 | Stop reason: HOSPADM

## 2025-08-03 RX ADMIN — HEPARIN SODIUM 5000 UNITS: 5000 INJECTION, SOLUTION INTRAVENOUS; SUBCUTANEOUS at 21:16

## 2025-08-03 RX ADMIN — INSULIN LISPRO 2 UNITS: 100 INJECTION, SOLUTION INTRAVENOUS; SUBCUTANEOUS at 21:39

## 2025-08-03 RX ADMIN — Medication 10 ML: at 21:17

## 2025-08-03 RX ADMIN — METHYLPREDNISOLONE SODIUM SUCCINATE 40 MG: 40 INJECTION INTRAMUSCULAR; INTRAVENOUS at 06:03

## 2025-08-03 RX ADMIN — AZTREONAM 2000 MG: 2 INJECTION, POWDER, LYOPHILIZED, FOR SOLUTION INTRAMUSCULAR; INTRAVENOUS at 18:00

## 2025-08-03 RX ADMIN — IPRATROPIUM BROMIDE AND ALBUTEROL SULFATE 3 ML: .5; 3 SOLUTION RESPIRATORY (INHALATION) at 12:27

## 2025-08-03 RX ADMIN — ACETAZOLAMIDE SODIUM 250 MG: 500 INJECTION, POWDER, LYOPHILIZED, FOR SOLUTION INTRAVENOUS at 11:38

## 2025-08-03 RX ADMIN — Medication 10 ML: at 08:50

## 2025-08-03 RX ADMIN — SODIUM CHLORIDE 500 MG: 9 INJECTION, SOLUTION INTRAVENOUS at 21:16

## 2025-08-03 RX ADMIN — METHYLPREDNISOLONE SODIUM SUCCINATE 40 MG: 40 INJECTION INTRAMUSCULAR; INTRAVENOUS at 18:00

## 2025-08-03 RX ADMIN — METOPROLOL TARTRATE 12.5 MG: 25 TABLET, FILM COATED ORAL at 14:04

## 2025-08-03 RX ADMIN — IPRATROPIUM BROMIDE AND ALBUTEROL SULFATE 3 ML: .5; 3 SOLUTION RESPIRATORY (INHALATION) at 00:40

## 2025-08-03 RX ADMIN — AZTREONAM 2000 MG: 2 INJECTION, POWDER, LYOPHILIZED, FOR SOLUTION INTRAMUSCULAR; INTRAVENOUS at 08:51

## 2025-08-03 RX ADMIN — AZTREONAM 2000 MG: 2 INJECTION, POWDER, LYOPHILIZED, FOR SOLUTION INTRAMUSCULAR; INTRAVENOUS at 02:27

## 2025-08-03 RX ADMIN — IPRATROPIUM BROMIDE AND ALBUTEROL SULFATE 3 ML: .5; 3 SOLUTION RESPIRATORY (INHALATION) at 06:34

## 2025-08-03 RX ADMIN — HEPARIN SODIUM 5000 UNITS: 5000 INJECTION, SOLUTION INTRAVENOUS; SUBCUTANEOUS at 08:50

## 2025-08-04 ENCOUNTER — READMISSION MANAGEMENT (OUTPATIENT)
Dept: CALL CENTER | Facility: HOSPITAL | Age: 60
End: 2025-08-04
Payer: COMMERCIAL

## 2025-08-04 LAB
ANION GAP SERPL CALCULATED.3IONS-SCNC: 9.7 MMOL/L (ref 5–15)
BUN SERPL-MCNC: 16.7 MG/DL (ref 6–20)
BUN/CREAT SERPL: 31.5 (ref 7–25)
CALCIUM SPEC-SCNC: 9 MG/DL (ref 8.6–10.5)
CHLORIDE SERPL-SCNC: 103 MMOL/L (ref 98–107)
CO2 SERPL-SCNC: 25.3 MMOL/L (ref 22–29)
CREAT SERPL-MCNC: 0.53 MG/DL (ref 0.57–1)
DEPRECATED RDW RBC AUTO: 45.8 FL (ref 37–54)
EGFRCR SERPLBLD CKD-EPI 2021: 108 ML/MIN/1.73
ERYTHROCYTE [DISTWIDTH] IN BLOOD BY AUTOMATED COUNT: 12.7 % (ref 12.3–15.4)
FOLATE SERPL-MCNC: 7.9 NG/ML (ref 4.78–24.2)
GLUCOSE BLDC GLUCOMTR-MCNC: 138 MG/DL (ref 70–130)
GLUCOSE BLDC GLUCOMTR-MCNC: 97 MG/DL (ref 70–130)
GLUCOSE SERPL-MCNC: 141 MG/DL (ref 65–99)
HCT VFR BLD AUTO: 37.2 % (ref 34–46.6)
HGB BLD-MCNC: 10.9 G/DL (ref 12–15.9)
IRON 24H UR-MRATE: 91 MCG/DL (ref 37–145)
IRON SATN MFR SERPL: 36 % (ref 20–50)
MCH RBC QN AUTO: 29 PG (ref 26.6–33)
MCHC RBC AUTO-ENTMCNC: 29.3 G/DL (ref 31.5–35.7)
MCV RBC AUTO: 98.9 FL (ref 79–97)
PLATELET # BLD AUTO: 275 10*3/MM3 (ref 140–450)
PMV BLD AUTO: 11.9 FL (ref 6–12)
POTASSIUM SERPL-SCNC: 4.6 MMOL/L (ref 3.5–5.2)
QT INTERVAL: 358 MS
QTC INTERVAL: 457 MS
RBC # BLD AUTO: 3.76 10*6/MM3 (ref 3.77–5.28)
RETICS # AUTO: 0.07 10*6/MM3 (ref 0.02–0.13)
RETICS/RBC NFR AUTO: 1.83 % (ref 0.7–1.9)
SODIUM SERPL-SCNC: 138 MMOL/L (ref 136–145)
TIBC SERPL-MCNC: 256 MCG/DL (ref 298–536)
TRANSFERRIN SERPL-MCNC: 172 MG/DL (ref 200–360)
TSH SERPL DL<=0.05 MIU/L-ACNC: 0.3 UIU/ML (ref 0.27–4.2)
VIT B12 BLD-MCNC: 482 PG/ML (ref 211–946)
WBC NRBC COR # BLD AUTO: 15.51 10*3/MM3 (ref 3.4–10.8)

## 2025-08-04 PROCEDURE — 25010000002 ACETAZOLAMIDE PER 500 MG: Performed by: INTERNAL MEDICINE

## 2025-08-04 PROCEDURE — 94799 UNLISTED PULMONARY SVC/PX: CPT

## 2025-08-04 PROCEDURE — 82746 ASSAY OF FOLIC ACID SERUM: CPT | Performed by: INTERNAL MEDICINE

## 2025-08-04 PROCEDURE — 99239 HOSP IP/OBS DSCHRG MGMT >30: CPT | Performed by: STUDENT IN AN ORGANIZED HEALTH CARE EDUCATION/TRAINING PROGRAM

## 2025-08-04 PROCEDURE — 82607 VITAMIN B-12: CPT | Performed by: INTERNAL MEDICINE

## 2025-08-04 PROCEDURE — 63710000001 PREDNISONE PER 1 MG: Performed by: STUDENT IN AN ORGANIZED HEALTH CARE EDUCATION/TRAINING PROGRAM

## 2025-08-04 PROCEDURE — 25010000002 AZTREONAM PER 500 MG: Performed by: INTERNAL MEDICINE

## 2025-08-04 PROCEDURE — 84443 ASSAY THYROID STIM HORMONE: CPT | Performed by: INTERNAL MEDICINE

## 2025-08-04 PROCEDURE — 25010000002 METHYLPREDNISOLONE PER 40 MG: Performed by: HOSPITALIST

## 2025-08-04 PROCEDURE — 80048 BASIC METABOLIC PNL TOTAL CA: CPT | Performed by: INTERNAL MEDICINE

## 2025-08-04 PROCEDURE — 85045 AUTOMATED RETICULOCYTE COUNT: CPT | Performed by: INTERNAL MEDICINE

## 2025-08-04 PROCEDURE — 85027 COMPLETE CBC AUTOMATED: CPT | Performed by: INTERNAL MEDICINE

## 2025-08-04 PROCEDURE — 82948 REAGENT STRIP/BLOOD GLUCOSE: CPT

## 2025-08-04 PROCEDURE — 94664 DEMO&/EVAL PT USE INHALER: CPT

## 2025-08-04 PROCEDURE — 93005 ELECTROCARDIOGRAM TRACING: CPT | Performed by: STUDENT IN AN ORGANIZED HEALTH CARE EDUCATION/TRAINING PROGRAM

## 2025-08-04 PROCEDURE — 83540 ASSAY OF IRON: CPT | Performed by: INTERNAL MEDICINE

## 2025-08-04 PROCEDURE — 94761 N-INVAS EAR/PLS OXIMETRY MLT: CPT

## 2025-08-04 PROCEDURE — 84466 ASSAY OF TRANSFERRIN: CPT | Performed by: INTERNAL MEDICINE

## 2025-08-04 PROCEDURE — 25010000002 HEPARIN (PORCINE) PER 1000 UNITS: Performed by: HOSPITALIST

## 2025-08-04 PROCEDURE — 99232 SBSQ HOSP IP/OBS MODERATE 35: CPT | Performed by: INTERNAL MEDICINE

## 2025-08-04 RX ORDER — PREDNISONE 20 MG/1
40 TABLET ORAL
Qty: 8 TABLET | Refills: 0 | Status: SHIPPED | OUTPATIENT
Start: 2025-08-05 | End: 2025-08-09

## 2025-08-04 RX ORDER — POTASSIUM CHLORIDE 750 MG/1
10 TABLET, EXTENDED RELEASE ORAL DAILY
Status: ON HOLD | COMMUNITY
End: 2025-08-04

## 2025-08-04 RX ORDER — FUROSEMIDE 40 MG/1
40 TABLET ORAL DAILY PRN
Qty: 30 TABLET | Refills: 0 | Status: SHIPPED | OUTPATIENT
Start: 2025-08-04

## 2025-08-04 RX ORDER — GUAIFENESIN 600 MG/1
1200 TABLET, EXTENDED RELEASE ORAL EVERY 12 HOURS SCHEDULED
Status: DISCONTINUED | OUTPATIENT
Start: 2025-08-04 | End: 2025-08-04 | Stop reason: HOSPADM

## 2025-08-04 RX ORDER — METOPROLOL TARTRATE 25 MG/1
12.5 TABLET, FILM COATED ORAL EVERY 12 HOURS SCHEDULED
Qty: 30 TABLET | Refills: 0 | Status: SHIPPED | OUTPATIENT
Start: 2025-08-04 | End: 2025-09-03

## 2025-08-04 RX ORDER — ROFLUMILAST 500 UG/1
500 TABLET ORAL DAILY
Status: DISCONTINUED | OUTPATIENT
Start: 2025-08-04 | End: 2025-08-04 | Stop reason: HOSPADM

## 2025-08-04 RX ORDER — BUDESONIDE AND FORMOTEROL FUMARATE DIHYDRATE 160; 4.5 UG/1; UG/1
2 AEROSOL RESPIRATORY (INHALATION)
Status: DISCONTINUED | OUTPATIENT
Start: 2025-08-04 | End: 2025-08-04 | Stop reason: HOSPADM

## 2025-08-04 RX ORDER — PREDNISONE 20 MG/1
40 TABLET ORAL
Status: DISCONTINUED | OUTPATIENT
Start: 2025-08-04 | End: 2025-08-04 | Stop reason: HOSPADM

## 2025-08-04 RX ORDER — POTASSIUM CHLORIDE 750 MG/1
10 TABLET, EXTENDED RELEASE ORAL DAILY PRN
Qty: 30 TABLET | Refills: 0 | Status: SHIPPED | OUTPATIENT
Start: 2025-08-04

## 2025-08-04 RX ORDER — GUAIFENESIN 600 MG/1
1200 TABLET, EXTENDED RELEASE ORAL EVERY 12 HOURS SCHEDULED
Qty: 120 TABLET | Refills: 0 | Status: SHIPPED | OUTPATIENT
Start: 2025-08-04 | End: 2025-09-03

## 2025-08-04 RX ORDER — ALBUTEROL SULFATE 0.83 MG/ML
2.5 SOLUTION RESPIRATORY (INHALATION) EVERY 4 HOURS PRN
Status: DISCONTINUED | OUTPATIENT
Start: 2025-08-04 | End: 2025-08-04 | Stop reason: HOSPADM

## 2025-08-04 RX ORDER — BUDESONIDE AND FORMOTEROL FUMARATE DIHYDRATE 160; 4.5 UG/1; UG/1
2 AEROSOL RESPIRATORY (INHALATION) 2 TIMES DAILY
Qty: 10.2 G | Refills: 0 | Status: SHIPPED | OUTPATIENT
Start: 2025-08-04 | End: 2025-08-04

## 2025-08-04 RX ORDER — HEPARIN SODIUM 5000 [USP'U]/ML
5000 INJECTION, SOLUTION INTRAVENOUS; SUBCUTANEOUS EVERY 8 HOURS SCHEDULED
Status: DISCONTINUED | OUTPATIENT
Start: 2025-08-04 | End: 2025-08-04 | Stop reason: HOSPADM

## 2025-08-04 RX ORDER — DOXYCYCLINE 100 MG/1
100 CAPSULE ORAL EVERY 12 HOURS SCHEDULED
Status: DISCONTINUED | OUTPATIENT
Start: 2025-08-04 | End: 2025-08-04 | Stop reason: HOSPADM

## 2025-08-04 RX ORDER — IPRATROPIUM BROMIDE AND ALBUTEROL SULFATE 2.5; .5 MG/3ML; MG/3ML
3 SOLUTION RESPIRATORY (INHALATION) EVERY 4 HOURS PRN
Qty: 360 ML | Refills: 0 | Status: SHIPPED | OUTPATIENT
Start: 2025-08-04

## 2025-08-04 RX ORDER — FUROSEMIDE 40 MG/1
40 TABLET ORAL DAILY PRN
Status: DISCONTINUED | OUTPATIENT
Start: 2025-08-04 | End: 2025-08-04 | Stop reason: HOSPADM

## 2025-08-04 RX ORDER — TIOTROPIUM BROMIDE AND OLODATEROL 3.124; 2.736 UG/1; UG/1
2 SPRAY, METERED RESPIRATORY (INHALATION) DAILY
Qty: 4 G | Refills: 0 | Status: SHIPPED | OUTPATIENT
Start: 2025-08-04

## 2025-08-04 RX ORDER — POTASSIUM CHLORIDE 1500 MG/1
10 TABLET, EXTENDED RELEASE ORAL DAILY PRN
Status: DISCONTINUED | OUTPATIENT
Start: 2025-08-04 | End: 2025-08-04 | Stop reason: HOSPADM

## 2025-08-04 RX ORDER — DOXYCYCLINE 100 MG/1
100 CAPSULE ORAL EVERY 12 HOURS SCHEDULED
Qty: 9 CAPSULE | Refills: 0 | Status: SHIPPED | OUTPATIENT
Start: 2025-08-04 | End: 2025-08-09

## 2025-08-04 RX ADMIN — ROFLUMILAST 500 MCG: 500 TABLET ORAL at 13:44

## 2025-08-04 RX ADMIN — IPRATROPIUM BROMIDE AND ALBUTEROL SULFATE 3 ML: .5; 3 SOLUTION RESPIRATORY (INHALATION) at 00:29

## 2025-08-04 RX ADMIN — PREDNISONE 40 MG: 20 TABLET ORAL at 09:47

## 2025-08-04 RX ADMIN — IPRATROPIUM BROMIDE AND ALBUTEROL SULFATE 3 ML: .5; 3 SOLUTION RESPIRATORY (INHALATION) at 13:37

## 2025-08-04 RX ADMIN — AZTREONAM 2000 MG: 2 INJECTION, POWDER, LYOPHILIZED, FOR SOLUTION INTRAMUSCULAR; INTRAVENOUS at 01:44

## 2025-08-04 RX ADMIN — METHYLPREDNISOLONE SODIUM SUCCINATE 40 MG: 40 INJECTION INTRAMUSCULAR; INTRAVENOUS at 06:54

## 2025-08-04 RX ADMIN — ACETAZOLAMIDE SODIUM 250 MG: 500 INJECTION, POWDER, LYOPHILIZED, FOR SOLUTION INTRAVENOUS at 01:24

## 2025-08-04 RX ADMIN — GUAIFENESIN 1200 MG: 600 TABLET, EXTENDED RELEASE ORAL at 09:48

## 2025-08-04 RX ADMIN — HEPARIN SODIUM 5000 UNITS: 5000 INJECTION, SOLUTION INTRAVENOUS; SUBCUTANEOUS at 08:06

## 2025-08-04 RX ADMIN — DOXYCYCLINE 100 MG: 100 CAPSULE ORAL at 09:48

## 2025-08-04 RX ADMIN — Medication 10 ML: at 08:06

## 2025-08-06 ENCOUNTER — READMISSION MANAGEMENT (OUTPATIENT)
Dept: CALL CENTER | Facility: HOSPITAL | Age: 60
End: 2025-08-06
Payer: COMMERCIAL

## 2025-08-06 LAB
BACTERIA SPEC AEROBE CULT: NORMAL
BACTERIA SPEC AEROBE CULT: NORMAL

## 2025-08-07 ENCOUNTER — READMISSION MANAGEMENT (OUTPATIENT)
Dept: CALL CENTER | Facility: HOSPITAL | Age: 60
End: 2025-08-07
Payer: COMMERCIAL

## 2025-08-08 ENCOUNTER — READMISSION MANAGEMENT (OUTPATIENT)
Dept: CALL CENTER | Facility: HOSPITAL | Age: 60
End: 2025-08-08
Payer: COMMERCIAL

## 2025-08-18 ENCOUNTER — READMISSION MANAGEMENT (OUTPATIENT)
Dept: CALL CENTER | Facility: HOSPITAL | Age: 60
End: 2025-08-18
Payer: COMMERCIAL